# Patient Record
Sex: MALE | Race: WHITE | Employment: OTHER | URBAN - NONMETROPOLITAN AREA
[De-identification: names, ages, dates, MRNs, and addresses within clinical notes are randomized per-mention and may not be internally consistent; named-entity substitution may affect disease eponyms.]

---

## 2017-01-01 ENCOUNTER — OFFICE VISIT (OUTPATIENT)
Dept: FAMILY MEDICINE CLINIC | Age: 82
End: 2017-01-01
Payer: MEDICARE

## 2017-01-01 ENCOUNTER — CARE COORDINATION (OUTPATIENT)
Dept: CARE COORDINATION | Age: 82
End: 2017-01-01

## 2017-01-01 ENCOUNTER — HOSPITAL ENCOUNTER (OUTPATIENT)
Age: 82
Discharge: HOME | End: 2017-10-30
Payer: MEDICARE

## 2017-01-01 ENCOUNTER — OFFICE VISIT (OUTPATIENT)
Dept: ENDOCRINOLOGY | Age: 82
End: 2017-01-01
Payer: MEDICARE

## 2017-01-01 ENCOUNTER — TELEPHONE (OUTPATIENT)
Dept: FAMILY MEDICINE CLINIC | Age: 82
End: 2017-01-01

## 2017-01-01 ENCOUNTER — HOSPITAL ENCOUNTER (OUTPATIENT)
Dept: RADIATION ONCOLOGY | Age: 82
Discharge: HOME | End: 2017-11-20
Payer: MEDICARE

## 2017-01-01 ENCOUNTER — CLINICAL DOCUMENTATION (OUTPATIENT)
Dept: NUTRITION | Age: 82
End: 2017-01-01

## 2017-01-01 ENCOUNTER — TELEPHONE (OUTPATIENT)
Dept: ENDOCRINOLOGY | Age: 82
End: 2017-01-01

## 2017-01-01 ENCOUNTER — HOSPITAL ENCOUNTER (OUTPATIENT)
Age: 82
Discharge: HOME | End: 2017-09-20
Payer: MEDICARE

## 2017-01-01 ENCOUNTER — HOSPITAL ENCOUNTER (OUTPATIENT)
Age: 82
Discharge: HOME | End: 2017-09-14
Payer: MEDICARE

## 2017-01-01 ENCOUNTER — HOSPITAL ENCOUNTER (OUTPATIENT)
Dept: RADIATION ONCOLOGY | Age: 82
Discharge: HOME | End: 2017-12-26
Payer: MEDICARE

## 2017-01-01 ENCOUNTER — HOSPITAL ENCOUNTER (OUTPATIENT)
Dept: RADIATION ONCOLOGY | Age: 82
Discharge: HOME | End: 2017-12-18
Payer: MEDICARE

## 2017-01-01 ENCOUNTER — HOSPITAL ENCOUNTER (OUTPATIENT)
Age: 82
Discharge: HOME | End: 2017-12-21
Payer: MEDICARE

## 2017-01-01 ENCOUNTER — HOSPITAL ENCOUNTER (OUTPATIENT)
Dept: RADIATION ONCOLOGY | Age: 82
Discharge: HOME | End: 2017-11-15
Payer: MEDICARE

## 2017-01-01 ENCOUNTER — HOSPITAL ENCOUNTER (OUTPATIENT)
Dept: RADIATION ONCOLOGY | Age: 82
Discharge: HOME | End: 2017-12-04
Payer: MEDICARE

## 2017-01-01 ENCOUNTER — HOSPITAL ENCOUNTER (OUTPATIENT)
Age: 82
Discharge: HOME | End: 2017-10-23
Payer: MEDICARE

## 2017-01-01 ENCOUNTER — HOSPITAL ENCOUNTER (OUTPATIENT)
Age: 82
Discharge: HOME | End: 2017-11-20
Payer: MEDICARE

## 2017-01-01 ENCOUNTER — HOSPITAL ENCOUNTER (OUTPATIENT)
Dept: CT IMAGING | Age: 82
Discharge: HOME | End: 2017-11-27
Payer: MEDICARE

## 2017-01-01 ENCOUNTER — HOSPITAL ENCOUNTER (OUTPATIENT)
Dept: GENERAL RADIOLOGY | Age: 82
Discharge: HOME | End: 2017-09-14
Payer: MEDICARE

## 2017-01-01 ENCOUNTER — HOSPITAL ENCOUNTER (OUTPATIENT)
Dept: RADIATION ONCOLOGY | Age: 82
Discharge: HOME | End: 2017-11-27
Payer: MEDICARE

## 2017-01-01 ENCOUNTER — HOSPITAL ENCOUNTER (OUTPATIENT)
Age: 82
Discharge: HOME | End: 2017-08-02
Payer: MEDICARE

## 2017-01-01 ENCOUNTER — HOSPITAL ENCOUNTER (OUTPATIENT)
Dept: RADIATION ONCOLOGY | Age: 82
Discharge: HOME | End: 2017-12-11
Payer: MEDICARE

## 2017-01-01 ENCOUNTER — HOSPITAL ENCOUNTER (OUTPATIENT)
Dept: CT IMAGING | Age: 82
Discharge: HOME | End: 2017-10-30
Payer: MEDICARE

## 2017-01-01 ENCOUNTER — HOSPITAL ENCOUNTER (OUTPATIENT)
Dept: GENERAL RADIOLOGY | Age: 82
Discharge: HOME | End: 2017-10-23
Payer: MEDICARE

## 2017-01-01 ENCOUNTER — HOSPITAL ENCOUNTER (OUTPATIENT)
Age: 82
Discharge: HOME | End: 2017-11-27
Payer: MEDICARE

## 2017-01-01 ENCOUNTER — HOSPITAL ENCOUNTER (OUTPATIENT)
Dept: PET IMAGING | Age: 82
Discharge: HOME | End: 2017-11-13
Payer: MEDICARE

## 2017-01-01 ENCOUNTER — HOSPITAL ENCOUNTER (OUTPATIENT)
Dept: GENERAL RADIOLOGY | Age: 82
Discharge: HOME | End: 2017-09-20
Payer: MEDICARE

## 2017-01-01 VITALS
BODY MASS INDEX: 35.21 KG/M2 | RESPIRATION RATE: 24 BRPM | HEIGHT: 71 IN | HEART RATE: 60 BPM | SYSTOLIC BLOOD PRESSURE: 180 MMHG | WEIGHT: 251.5 LBS | DIASTOLIC BLOOD PRESSURE: 80 MMHG

## 2017-01-01 VITALS
DIASTOLIC BLOOD PRESSURE: 60 MMHG | WEIGHT: 249.2 LBS | HEART RATE: 63 BPM | SYSTOLIC BLOOD PRESSURE: 124 MMHG | TEMPERATURE: 98.7 F | OXYGEN SATURATION: 96 % | RESPIRATION RATE: 17 BRPM | BODY MASS INDEX: 34.89 KG/M2

## 2017-01-01 VITALS
OXYGEN SATURATION: 95 % | RESPIRATION RATE: 12 BRPM | DIASTOLIC BLOOD PRESSURE: 60 MMHG | BODY MASS INDEX: 35.28 KG/M2 | WEIGHT: 252 LBS | HEART RATE: 88 BPM | SYSTOLIC BLOOD PRESSURE: 136 MMHG | TEMPERATURE: 97.4 F | HEIGHT: 71 IN

## 2017-01-01 VITALS
HEART RATE: 60 BPM | RESPIRATION RATE: 20 BRPM | WEIGHT: 249 LBS | BODY MASS INDEX: 34.86 KG/M2 | BODY MASS INDEX: 34.47 KG/M2 | SYSTOLIC BLOOD PRESSURE: 136 MMHG | OXYGEN SATURATION: 95 % | DIASTOLIC BLOOD PRESSURE: 70 MMHG | WEIGHT: 246.2 LBS | HEIGHT: 71 IN | SYSTOLIC BLOOD PRESSURE: 164 MMHG | DIASTOLIC BLOOD PRESSURE: 58 MMHG | HEART RATE: 60 BPM | RESPIRATION RATE: 24 BRPM | HEIGHT: 71 IN

## 2017-01-01 VITALS
SYSTOLIC BLOOD PRESSURE: 160 MMHG | HEIGHT: 71 IN | WEIGHT: 250.8 LBS | DIASTOLIC BLOOD PRESSURE: 66 MMHG | OXYGEN SATURATION: 91 % | TEMPERATURE: 98.2 F | HEART RATE: 60 BPM | RESPIRATION RATE: 18 BRPM | BODY MASS INDEX: 35.11 KG/M2

## 2017-01-01 DIAGNOSIS — R91.1 LESION OF LEFT LUNG: ICD-10-CM

## 2017-01-01 DIAGNOSIS — R91.1 LESION OF LEFT LUNG: Primary | ICD-10-CM

## 2017-01-01 DIAGNOSIS — C34.12 MALIGNANT NEOPLASM OF UPPER LOBE, LEFT BRONCHUS OR LUNG (HCC): ICD-10-CM

## 2017-01-01 DIAGNOSIS — E11.65 UNCONTROLLED TYPE 2 DIABETES MELLITUS WITH HYPERGLYCEMIA, WITH LONG-TERM CURRENT USE OF INSULIN (HCC): Primary | ICD-10-CM

## 2017-01-01 DIAGNOSIS — C91.10 CLL (CHRONIC LYMPHOCYTIC LEUKEMIA) (HCC): ICD-10-CM

## 2017-01-01 DIAGNOSIS — Z23 NEED FOR PNEUMOCOCCAL VACCINATION: ICD-10-CM

## 2017-01-01 DIAGNOSIS — E78.01 FAMILIAL HYPERCHOLESTEROLEMIA: ICD-10-CM

## 2017-01-01 DIAGNOSIS — Z79.4 UNCONTROLLED TYPE 2 DIABETES MELLITUS WITH HYPERGLYCEMIA, WITH LONG-TERM CURRENT USE OF INSULIN (HCC): Primary | ICD-10-CM

## 2017-01-01 DIAGNOSIS — I50.42 CHRONIC COMBINED SYSTOLIC AND DIASTOLIC CONGESTIVE HEART FAILURE (HCC): ICD-10-CM

## 2017-01-01 DIAGNOSIS — J40 BRONCHITIS: ICD-10-CM

## 2017-01-01 DIAGNOSIS — J18.9 PNEUMONIA OF BOTH LUNGS DUE TO INFECTIOUS ORGANISM, UNSPECIFIED PART OF LUNG: Primary | ICD-10-CM

## 2017-01-01 DIAGNOSIS — C34.12 SQUAMOUS CELL CARCINOMA OF BRONCHUS IN LEFT UPPER LOBE (HCC): Primary | ICD-10-CM

## 2017-01-01 DIAGNOSIS — I10 ESSENTIAL HYPERTENSION: ICD-10-CM

## 2017-01-01 DIAGNOSIS — N40.0 BENIGN PROSTATIC HYPERPLASIA WITHOUT LOWER URINARY TRACT SYMPTOMS: ICD-10-CM

## 2017-01-01 DIAGNOSIS — J18.9 PNEUMONIA OF LEFT UPPER LOBE DUE TO INFECTIOUS ORGANISM: ICD-10-CM

## 2017-01-01 DIAGNOSIS — E78.5 HYPERLIPIDEMIA, UNSPECIFIED HYPERLIPIDEMIA TYPE: ICD-10-CM

## 2017-01-01 DIAGNOSIS — E11.65 UNCONTROLLED TYPE 2 DIABETES MELLITUS WITH HYPERGLYCEMIA, WITHOUT LONG-TERM CURRENT USE OF INSULIN (HCC): Primary | ICD-10-CM

## 2017-01-01 DIAGNOSIS — E78.00 HYPERCHOLESTEROLEMIA: ICD-10-CM

## 2017-01-01 DIAGNOSIS — C34.92 SQUAMOUS CELL CARCINOMA LUNG, LEFT (HCC): ICD-10-CM

## 2017-01-01 DIAGNOSIS — J18.9 PNEUMONIA OF LEFT UPPER LOBE DUE TO INFECTIOUS ORGANISM: Primary | ICD-10-CM

## 2017-01-01 DIAGNOSIS — J40 BRONCHITIS: Primary | ICD-10-CM

## 2017-01-01 DIAGNOSIS — Z23 NEEDS FLU SHOT: ICD-10-CM

## 2017-01-01 DIAGNOSIS — C34.12 SQUAMOUS CELL CARCINOMA OF BRONCHUS IN LEFT UPPER LOBE (HCC): ICD-10-CM

## 2017-01-01 DIAGNOSIS — D49.7 ADRENAL TUMOR: ICD-10-CM

## 2017-01-01 DIAGNOSIS — J18.9 PNEUMONIA OF BOTH LUNGS DUE TO INFECTIOUS ORGANISM, UNSPECIFIED PART OF LUNG: ICD-10-CM

## 2017-01-01 LAB
ALBUMIN SERPL-MCNC: 3.2 GM/DL (ref 3.5–5)
ALP BLD-CCNC: 89 U/L (ref 46–116)
ALT SERPL-CCNC: 25 U/L (ref 12–78)
ANION GAP SERPL CALCULATED.3IONS-SCNC: 15 MEQ/L (ref 8–16)
ANION GAP: 6 MEQ/L (ref 8–16)
ANISOCYTOSIS: ABNORMAL
AST SERPL-CCNC: 23 U/L (ref 15–37)
ATYPICAL LYMPHOCYTES: ABNORMAL %
BASOPHILIA: SLIGHT
BASOPHILS # BLD: 0.4 %
BASOPHILS ABSOLUTE: 0 THOU/MM3 (ref 0–0.1)
BILIRUB SERPL-MCNC: 0.7 MG/DL (ref 0.2–1)
BUN BLDV-MCNC: 22 MG/DL (ref 7–22)
BUN BLDV-MCNC: 24 MG/DL (ref 7–18)
CALCIUM SERPL-MCNC: 9.7 MG/DL (ref 8.5–10.5)
CHLORIDE BLD-SCNC: 103 MEQ/L (ref 98–111)
CHLORIDE BLD-SCNC: 106 MEQ/L (ref 98–107)
CHOLESTEROL, FASTING: 106 MG/DL (ref 100–199)
CO2: 27 MEQ/L (ref 23–33)
CO2: 31 MEQ/L (ref 21–32)
CREAT SERPL-MCNC: 0.9 MG/DL (ref 0.4–1.2)
CREAT SERPL-MCNC: 1.1 MG/DL (ref 0.4–1.2)
CREAT SERPL-MCNC: 1.1 MG/DL (ref 0.8–1.3)
CREATININE, WHOLE BLOOD: 1 MG/DL (ref 0.5–1.2)
CREATININE, WHOLE BLOOD: 1.1 MG/DL (ref 0.5–1.2)
EOSINOPHIL # BLD: 1 %
EOSINOPHILS ABSOLUTE: 0.1 THOU/MM3 (ref 0–0.4)
ESTIMATED GFR, PCACC: 67 ML/MIN/1.73M2
GFR SERPL CREATININE-BSD FRML MDRD: 63 ML/MIN/1.73M2
GFR SERPL CREATININE-BSD FRML MDRD: 79 ML/MIN/1.73M2
GFR, ESTIMATED: 67 ML/MIN/1.73M2
GFR, ESTIMATED: 75 ML/MIN/1.73M2
GLUCOSE BLD-MCNC: 182 MG/DL (ref 70–108)
GLUCOSE BLD-MCNC: 185 MG/DL (ref 74–106)
HCT VFR BLD CALC: 38.6 % (ref 42–52)
HDLC SERPL-MCNC: 44 MG/DL
HEMOGLOBIN: 13.2 GM/DL (ref 14–18)
LDL CHOLESTEROL CALCULATED: 36 MG/DL
LYMPHOCYTES # BLD: 55.4 %
LYMPHOCYTES ABSOLUTE: 5.6 THOU/MM3 (ref 1–4.8)
MAGNESIUM: 2.3 MG/DL (ref 1.6–2.4)
MCH RBC QN AUTO: 33.1 PG (ref 27–31)
MCHC RBC AUTO-ENTMCNC: 34.3 GM/DL (ref 33–37)
MCV RBC AUTO: 96.4 FL (ref 80–94)
MONOCYTES # BLD: 4.4 %
MONOCYTES ABSOLUTE: 0.4 THOU/MM3 (ref 0.4–1.3)
NUCLEATED RED BLOOD CELLS: 0 /100 WBC
PATHOLOGIST REVIEW: ABNORMAL
PDW BLD-RTO: 14.8 % (ref 11.5–14.5)
PLATELET # BLD: 111 THOU/MM3 (ref 130–400)
PLATELET ESTIMATE: ADEQUATE
PMV BLD AUTO: 8.7 MCM (ref 7.4–10.4)
POC CALCIUM: 9.3 MG/DL (ref 8.5–10.1)
POIKILOCYTES: ABNORMAL
POTASSIUM SERPL-SCNC: 4.6 MEQ/L (ref 3.5–5.2)
POTASSIUM SERPL-SCNC: 4.8 MEQ/L (ref 3.5–5.1)
RBC # BLD: 4 MILL/MM3 (ref 4.7–6.1)
SCAN OF BLOOD SMEAR: NORMAL
SEG NEUTROPHILS: 38.8 %
SEGMENTED NEUTROPHILS ABSOLUTE COUNT: 3.9 THOU/MM3 (ref 1.8–7.7)
SODIUM BLD-SCNC: 143 MEQ/L (ref 136–145)
SODIUM BLD-SCNC: 145 MEQ/L (ref 135–145)
TOTAL PROTEIN: 6.3 GM/DL (ref 6.4–8.2)
TRIGLYCERIDE, FASTING: 131 MG/DL (ref 0–199)
WBC # BLD: 10.1 THOU/MM3 (ref 4.8–10.8)

## 2017-01-01 PROCEDURE — 82310 ASSAY OF CALCIUM: CPT

## 2017-01-01 PROCEDURE — 90732 PPSV23 VACC 2 YRS+ SUBQ/IM: CPT | Performed by: FAMILY MEDICINE

## 2017-01-01 PROCEDURE — G8417 CALC BMI ABV UP PARAM F/U: HCPCS | Performed by: FAMILY MEDICINE

## 2017-01-01 PROCEDURE — 82565 ASSAY OF CREATININE: CPT

## 2017-01-01 PROCEDURE — 77386 HC NTSTY MODUL RAD TX DLVR CPLX: CPT | Performed by: RADIOLOGY

## 2017-01-01 PROCEDURE — A9552 F18 FDG: HCPCS | Performed by: RADIOLOGY

## 2017-01-01 PROCEDURE — 71020 XR CHEST STANDARD TWO VW: CPT

## 2017-01-01 PROCEDURE — G8427 DOCREV CUR MEDS BY ELIG CLIN: HCPCS | Performed by: FAMILY MEDICINE

## 2017-01-01 PROCEDURE — 71260 CT THORAX DX C+: CPT

## 2017-01-01 PROCEDURE — 80061 LIPID PANEL: CPT

## 2017-01-01 PROCEDURE — 99211 OFF/OP EST MAY X REQ PHY/QHP: CPT | Performed by: RADIOLOGY

## 2017-01-01 PROCEDURE — G8484 FLU IMMUNIZE NO ADMIN: HCPCS | Performed by: INTERNAL MEDICINE

## 2017-01-01 PROCEDURE — G8417 CALC BMI ABV UP PARAM F/U: HCPCS | Performed by: INTERNAL MEDICINE

## 2017-01-01 PROCEDURE — 4040F PNEUMOC VAC/ADMIN/RCVD: CPT | Performed by: FAMILY MEDICINE

## 2017-01-01 PROCEDURE — 82947 ASSAY GLUCOSE BLOOD QUANT: CPT

## 2017-01-01 PROCEDURE — 1036F TOBACCO NON-USER: CPT | Performed by: FAMILY MEDICINE

## 2017-01-01 PROCEDURE — 80053 COMPREHEN METABOLIC PANEL: CPT

## 2017-01-01 PROCEDURE — 77336 RADIATION PHYSICS CONSULT: CPT | Performed by: RADIOLOGY

## 2017-01-01 PROCEDURE — 77386 HC NTSTY MODUL RAD TX DLVR CPLX: CPT

## 2017-01-01 PROCEDURE — 1123F ACP DISCUSS/DSCN MKR DOCD: CPT | Performed by: INTERNAL MEDICINE

## 2017-01-01 PROCEDURE — 1036F TOBACCO NON-USER: CPT | Performed by: INTERNAL MEDICINE

## 2017-01-01 PROCEDURE — 3430000000 HC RX DIAGNOSTIC RADIOPHARMACEUTICAL: Performed by: RADIOLOGY

## 2017-01-01 PROCEDURE — 77331 SPECIAL RADIATION DOSIMETRY: CPT | Performed by: RADIOLOGY

## 2017-01-01 PROCEDURE — G8484 FLU IMMUNIZE NO ADMIN: HCPCS | Performed by: FAMILY MEDICINE

## 2017-01-01 PROCEDURE — 99214 OFFICE O/P EST MOD 30 MIN: CPT | Performed by: INTERNAL MEDICINE

## 2017-01-01 PROCEDURE — 77301 RADIOTHERAPY DOSE PLAN IMRT: CPT | Performed by: RADIOLOGY

## 2017-01-01 PROCEDURE — 4040F PNEUMOC VAC/ADMIN/RCVD: CPT | Performed by: INTERNAL MEDICINE

## 2017-01-01 PROCEDURE — 84132 ASSAY OF SERUM POTASSIUM: CPT

## 2017-01-01 PROCEDURE — 77300 RADIATION THERAPY DOSE PLAN: CPT | Performed by: RADIOLOGY

## 2017-01-01 PROCEDURE — 80048 BASIC METABOLIC PNL TOTAL CA: CPT

## 2017-01-01 PROCEDURE — 84155 ASSAY OF PROTEIN SERUM: CPT

## 2017-01-01 PROCEDURE — 77370 RADIATION PHYSICS CONSULT: CPT | Performed by: RADIOLOGY

## 2017-01-01 PROCEDURE — 99213 OFFICE O/P EST LOW 20 MIN: CPT | Performed by: FAMILY MEDICINE

## 2017-01-01 PROCEDURE — 6360000004 HC RX CONTRAST MEDICATION: Performed by: FAMILY MEDICINE

## 2017-01-01 PROCEDURE — 90686 IIV4 VACC NO PRSV 0.5 ML IM: CPT | Performed by: FAMILY MEDICINE

## 2017-01-01 PROCEDURE — 36415 COLL VENOUS BLD VENIPUNCTURE: CPT

## 2017-01-01 PROCEDURE — 77338 DESIGN MLC DEVICE FOR IMRT: CPT | Performed by: RADIOLOGY

## 2017-01-01 PROCEDURE — 82247 BILIRUBIN TOTAL: CPT

## 2017-01-01 PROCEDURE — 84450 TRANSFERASE (AST) (SGOT): CPT

## 2017-01-01 PROCEDURE — 99214 OFFICE O/P EST MOD 30 MIN: CPT | Performed by: FAMILY MEDICINE

## 2017-01-01 PROCEDURE — 84520 ASSAY OF UREA NITROGEN: CPT

## 2017-01-01 PROCEDURE — 82435 ASSAY OF BLOOD CHLORIDE: CPT

## 2017-01-01 PROCEDURE — 84075 ASSAY ALKALINE PHOSPHATASE: CPT

## 2017-01-01 PROCEDURE — 84460 ALANINE AMINO (ALT) (SGPT): CPT

## 2017-01-01 PROCEDURE — 1123F ACP DISCUSS/DSCN MKR DOCD: CPT | Performed by: FAMILY MEDICINE

## 2017-01-01 PROCEDURE — 77334 RADIATION TREATMENT AID(S): CPT | Performed by: RADIOLOGY

## 2017-01-01 PROCEDURE — 96372 THER/PROPH/DIAG INJ SC/IM: CPT | Performed by: FAMILY MEDICINE

## 2017-01-01 PROCEDURE — 82040 ASSAY OF SERUM ALBUMIN: CPT

## 2017-01-01 PROCEDURE — 77336 RADIATION PHYSICS CONSULT: CPT

## 2017-01-01 PROCEDURE — 84295 ASSAY OF SERUM SODIUM: CPT

## 2017-01-01 PROCEDURE — 83735 ASSAY OF MAGNESIUM: CPT

## 2017-01-01 PROCEDURE — 85025 COMPLETE CBC W/AUTO DIFF WBC: CPT

## 2017-01-01 PROCEDURE — 3209999900 CT GUIDE RADIATION THERAPY NO CHARGE

## 2017-01-01 PROCEDURE — 77332 RADIATION TREATMENT AID(S): CPT | Performed by: RADIOLOGY

## 2017-01-01 PROCEDURE — 82374 ASSAY BLOOD CARBON DIOXIDE: CPT

## 2017-01-01 PROCEDURE — G8427 DOCREV CUR MEDS BY ELIG CLIN: HCPCS | Performed by: INTERNAL MEDICINE

## 2017-01-01 PROCEDURE — G0008 ADMIN INFLUENZA VIRUS VAC: HCPCS | Performed by: FAMILY MEDICINE

## 2017-01-01 PROCEDURE — 78815 PET IMAGE W/CT SKULL-THIGH: CPT

## 2017-01-01 PROCEDURE — G0009 ADMIN PNEUMOCOCCAL VACCINE: HCPCS | Performed by: FAMILY MEDICINE

## 2017-01-01 RX ORDER — CLINDAMYCIN HYDROCHLORIDE 300 MG/1
300 CAPSULE ORAL 3 TIMES DAILY
Qty: 30 CAPSULE | Refills: 0 | Status: SHIPPED | OUTPATIENT
Start: 2017-01-01 | End: 2017-01-01

## 2017-01-01 RX ORDER — FLUDEOXYGLUCOSE F 18 200 MCI/ML
12.1 INJECTION, SOLUTION INTRAVENOUS
Status: COMPLETED | OUTPATIENT
Start: 2017-01-01 | End: 2017-01-01

## 2017-01-01 RX ORDER — TRIAMCINOLONE ACETONIDE 40 MG/ML
40 INJECTION, SUSPENSION INTRA-ARTICULAR; INTRAMUSCULAR ONCE
Status: COMPLETED | OUTPATIENT
Start: 2017-01-01 | End: 2017-01-01

## 2017-01-01 RX ORDER — HYDRALAZINE HYDROCHLORIDE 10 MG/1
TABLET, FILM COATED ORAL
Qty: 360 TABLET | Refills: 3 | Status: ON HOLD | OUTPATIENT
Start: 2017-01-01 | End: 2018-01-01 | Stop reason: HOSPADM

## 2017-01-01 RX ORDER — ATORVASTATIN CALCIUM 80 MG/1
TABLET, FILM COATED ORAL
Qty: 90 TABLET | Refills: 11 | Status: ON HOLD | OUTPATIENT
Start: 2017-01-01 | End: 2018-01-01

## 2017-01-01 RX ORDER — LISINOPRIL 30 MG/1
TABLET ORAL
Qty: 90 TABLET | Refills: 3 | Status: SHIPPED | OUTPATIENT
Start: 2017-01-01 | End: 2018-01-01 | Stop reason: DRUGHIGH

## 2017-01-01 RX ORDER — CEFTRIAXONE 1 G/1
1 INJECTION, POWDER, FOR SOLUTION INTRAMUSCULAR; INTRAVENOUS ONCE
Status: COMPLETED | OUTPATIENT
Start: 2017-01-01 | End: 2017-01-01

## 2017-01-01 RX ORDER — LEVOFLOXACIN 500 MG/1
500 TABLET, FILM COATED ORAL DAILY
Qty: 10 TABLET | Refills: 0 | Status: SHIPPED | OUTPATIENT
Start: 2017-01-01 | End: 2017-01-01 | Stop reason: ALTCHOICE

## 2017-01-01 RX ORDER — BLOOD-GLUCOSE METER
1 KIT MISCELLANEOUS DAILY
Qty: 1 KIT | Refills: 0 | Status: ON HOLD | OUTPATIENT
Start: 2017-01-01 | End: 2018-01-01

## 2017-01-01 RX ADMIN — IOPAMIDOL 100 ML: 755 INJECTION, SOLUTION INTRAVENOUS at 10:13

## 2017-01-01 RX ADMIN — CEFTRIAXONE 1 G: 1 INJECTION, POWDER, FOR SOLUTION INTRAMUSCULAR; INTRAVENOUS at 15:37

## 2017-01-01 RX ADMIN — TRIAMCINOLONE ACETONIDE 40 MG: 40 INJECTION, SUSPENSION INTRA-ARTICULAR; INTRAMUSCULAR at 15:39

## 2017-01-01 RX ADMIN — FLUDEOXYGLUCOSE F 18 12.1 MILLICURIE: 200 INJECTION, SOLUTION INTRAVENOUS at 12:23

## 2017-01-01 ASSESSMENT — PATIENT HEALTH QUESTIONNAIRE - PHQ9
2. FEELING DOWN, DEPRESSED OR HOPELESS: 0
1. LITTLE INTEREST OR PLEASURE IN DOING THINGS: 1
SUM OF ALL RESPONSES TO PHQ9 QUESTIONS 1 & 2: 1
SUM OF ALL RESPONSES TO PHQ QUESTIONS 1-9: 1

## 2017-01-01 ASSESSMENT — ENCOUNTER SYMPTOMS: BLURRED VISION: 0

## 2017-01-26 ENCOUNTER — CARE COORDINATION (OUTPATIENT)
Dept: CARE COORDINATION | Age: 82
End: 2017-01-26

## 2017-02-23 ENCOUNTER — CARE COORDINATION (OUTPATIENT)
Dept: CARE COORDINATION | Age: 82
End: 2017-02-23

## 2017-03-30 ENCOUNTER — CARE COORDINATION (OUTPATIENT)
Dept: CARE COORDINATION | Age: 82
End: 2017-03-30

## 2017-04-19 RX ORDER — INSULIN ASPART 100 [IU]/ML
INJECTION, SUSPENSION SUBCUTANEOUS
Qty: 8 PEN | Refills: 1 | Status: SHIPPED | OUTPATIENT
Start: 2017-04-19 | End: 2017-01-01 | Stop reason: SDUPTHER

## 2017-05-04 ENCOUNTER — CARE COORDINATION (OUTPATIENT)
Dept: CARE COORDINATION | Age: 82
End: 2017-05-04

## 2017-05-25 ENCOUNTER — TELEPHONE (OUTPATIENT)
Dept: FAMILY MEDICINE CLINIC | Age: 82
End: 2017-05-25

## 2017-06-06 ENCOUNTER — CARE COORDINATION (OUTPATIENT)
Dept: CARE COORDINATION | Age: 82
End: 2017-06-06

## 2017-06-29 ENCOUNTER — TELEPHONE (OUTPATIENT)
Dept: ENDOCRINOLOGY | Age: 82
End: 2017-06-29

## 2017-06-29 DIAGNOSIS — E78.01 FAMILIAL HYPERCHOLESTEROLEMIA: ICD-10-CM

## 2017-06-29 RX ORDER — LISINOPRIL 20 MG/1
TABLET ORAL
Qty: 90 TABLET | Refills: 0 | Status: SHIPPED | OUTPATIENT
Start: 2017-06-29 | End: 2017-01-01 | Stop reason: SDUPTHER

## 2017-07-05 RX ORDER — METFORMIN HYDROCHLORIDE 500 MG/1
TABLET, EXTENDED RELEASE ORAL
Qty: 180 TABLET | Refills: 1 | Status: SHIPPED | OUTPATIENT
Start: 2017-07-05 | End: 2017-01-01

## 2017-07-17 ENCOUNTER — TELEPHONE (OUTPATIENT)
Dept: FAMILY MEDICINE CLINIC | Age: 82
End: 2017-07-17

## 2017-07-17 RX ORDER — TAMSULOSIN HYDROCHLORIDE 0.4 MG/1
CAPSULE ORAL
Qty: 180 CAPSULE | Refills: 3 | Status: SHIPPED | OUTPATIENT
Start: 2017-07-17 | End: 2018-01-01 | Stop reason: ALTCHOICE

## 2017-07-21 ENCOUNTER — TELEPHONE (OUTPATIENT)
Dept: ENDOCRINOLOGY | Age: 82
End: 2017-07-21

## 2017-10-17 NOTE — CARE COORDINATION
Attempted to reach patient from our conversation earlier today. Patient was not available and generic voicemail message was left asking patient to please return call to my direct number.
Dr. Juan Jose Carrera:  Please see below note. Patient reported he continues to have c/o cough with hemoptysis, especially in the AM.  Patient denies any c/o fever/night sweats or increased SOB. Patient continues to take breathing treatments at least BID and is wearing O2 cont. at 2 1/2 L. Patient continues to take baby ASA and Plavix as prescribed. Patient stated he has order for x-ray to be completed prior to his appointment. Patient is scheduled to f/u with you 10/25.
albuterol (PROVENTIL) (2.5 MG/3ML) 0.083% nebulizer solution Take 3 mLs by nebulization every 4 hours as needed for Wheezing 4/18/16   Nora Momin MD   Scooter MISC by Does not apply route. 8/26/13   Nora Momin MD   CHERRY PO Take 1 capsule by mouth daily. Historical Provider, MD Severino MISC Check 2x/day 7/10/13   Sandy Alegria MD   metoprolol (LOPRESSOR) 100 MG tablet Take 100 mg by mouth 2 times daily. Historical Provider, MD   primidone (MYSOLINE) 250 MG tablet Take 250 mg by mouth 3 times daily. Historical Provider, MD   Coenzyme Q10 (COQ10 PO) Take  by mouth. Historical Provider, MD   pantoprazole (PROTONIX) 40 MG tablet Take 40 mg by mouth daily. Historical Provider, MD   aspirin 81 MG tablet Take 81 mg by mouth daily. Historical Provider, MD   Cranberry 1000 MG CAPS Take  by mouth. Historical Provider, MD   fish oil-omega-3 fatty acids 1000 MG capsule Take 2 g by mouth daily. Historical Provider, MD   therapeutic multivitamin-minerals (THERAGRAN-M) tablet Take 1 tablet by mouth daily.       Historical Provider, MD       Future Appointments  Date Time Provider Kinjal Pate   10/25/2017 10:10 AM [facility] [place] [facility]   11/30/2017 2:00 PM Sandy Alegria MD [facility] [place] [facility]     General Assessment    Do you have any symptoms that are causing concern?:  Yes  Progression since Onset:  Intermittent - Waxing/Waning  Reported Symptoms:  Other (Comment: hemoptysis )      and   Congestive Heart Failure Assessment    Are you currently restricting fluids?:  No Restriction  Do you understand a low sodium diet?:  Yes     No patient-reported symptoms      Symptoms:   None:  Yes      Symptom course:  stable  Patient-reported weight (lb):  246  Weight trend:  stable     ,   Diabetes Assessment    Meal Planning:  Avoidance of concentrated sweets   How often do you test your blood sugar?:  Daily   Do you have barriers with adherence to non-pharmacologic

## 2017-10-31 NOTE — CARE COORDINATION
Ambulatory Care Coordination Note  10/31/2017  CM Risk Score: 3  Dione Mortality Risk Score: 49.72    ACC: Lauren Amin RN    Summary Note: Patient was called for continued Care Coordination follow up and education. Patient shared he is starting to feel better and his cough has diminished. Patient denied any further c/o hemoptysis. CT of the chest was completed yesterday and patient awaits results. Informed patient PCP office should be calling in the next 1-2 days with results and he acknowledged understanding. Patient stated his swelling remains at his baseline and improves with elevation. Patient continues to wear his O2 as instructed. Daily weight monitoring continues and patient stated his weight remains stable in the 248-252 range. Symptoms to call and report and importance of early symptom recognition reviewed with patient and he acknowledged understanding. Patient stated he continues to monitor BS daily and BS was 172 this AM.  Diabetic diet information was briefly reviewed. Patient continues to take insulin as instructed and insulin administration reviewed with patient. CHF and diabetes zone education sheets also reviewed with patient and copy placed in the mail. Patient denied any other questions, concerns, or needs and he was encouraged to call with any that may develop. Care Coordination Interventions    Program Enrollment:  Rising Risk  Referral from Primary Care Provider:  No  Suggested Interventions and Community Resources  Diabetes Education:  Completed  Fall Risk Prevention:  Completed  Medication Assistance Program:  Declined (Comment: denied need at this time )  Medi Set or Pill Pack:  Completed  Transportation Support:  Declined  Zone Management Tools:  Completed  Other Services or Interventions:   Follows with endo         Goals Addressed             Most Recent     Care Coordination Goal: Live Independent   On track (10/31/2017)             Care Coordination Goal: Independent

## 2017-11-01 NOTE — TELEPHONE ENCOUNTER
Patient notified of the cT results with a tumor in the DIANA  He wants to think about it a little bit but is not interested in chemo or radiation  He does not want me to call the kids yet  Call later in the week and have him schedule a followup in 1 month

## 2017-11-03 NOTE — TELEPHONE ENCOUNTER
Patient voiced he is doing OK, appointment scheduled and reminder letter sent to patient. No concerns voiced.

## 2017-11-19 NOTE — PROGRESS NOTES
[address]  Encounter Date: 11/15/2017     Mr. Johny Jack is a 80 y.o. male  : 1928  MRN: [id number]  Acct Number: [de-identified]  Requesting Provider: Dr. Ayaan Lynch    Reason for request: Recurrent Lung Cancer      CONSULTANT: Cory Luong      CHIEF COMPLAINT: C34.12 -- Malignant Neoplasm of the Left Upper Lobe Bronchus or Lung, Poorly Differentiated Squamous Cell Carcinoma, T1a N0 M0, Stage IA at initial diagnosis, s/p Stereotactic Ablative Radiation Therapy in , now with clinical diagnosis of recurrence adjacent to prior treatment area (marginal recurrence), recurrent stage r T2a N2 M0, Stage IIIA. [NOTE: PET/CT shows sizable area of dense fibrotic/inflammatory change related to prior SBRT/SABR, with an adjacent/contiguous mass with SUV up to 24.4, with two hypermetabolic lymph nodes in the ipsilateral mediastinum]  C91.11 -- Chronic Lymphocytic Leukemia      HISTORY OF PRESENT ILLNESS:   Memo Candelaria was previously known to radiation oncology. He has a history of CLL, and was seen for a radiation oncology consultation in 2015 after being diagnosed with squamous cell carcinoma of the left upper lobe lung. He was not considered medically operable, and in accordance with clinical practice guidelines he received a recommendation for, and was agreeable to stereotactic ablative radiation therapy. He underwent the treatment in early 2015. At the time of his initial post-treatment check he was doing well. He was subsequently seen by Dr. Bishnu Montiel for follow-up in 2015. At that time, follow-up CT scanning showed what appeared to be a fibrotic/inflammatory response to the treatment. Repeat PET scanning showed that the lesion had significant decrease in hypermetabolic uptake consistent with favorable response to therapy. He was seen for a checkup in 2016 and was to have returned in 3-4 months.   However, he canceled the disease     Shingles     history of    Skin cancer     Dr Nohemy Estrada Tobacco abuse     history of        Past Surgical History:   Procedure Laterality Date    APPENDECTOMY      BACK SURGERY  1995    discectomy    CARDIAC SURGERY  1992    CABG    CATARACT REMOVAL Bilateral     COLONOSCOPY  11/2005    HERNIA REPAIR      bilateral inguinal    INNER EAR SURGERY      x2 , Dr Luc Chacon LIVER BIOPSY      OTHER SURGICAL HISTORY      excision of basal cell carcinoma    OTHER SURGICAL HISTORY  2000    right lower eyelid    PACEMAKER PLACEMENT         Family History   Problem Relation Age of Onset    Heart Disease Mother     Diabetes Mother     Heart Disease Father        Social History     Social History    Marital status:      Spouse name: N/A    Number of children: N/A    Years of education: N/A     Occupational History    Not on file.      Social History Main Topics    Smoking status: Former Smoker     Years: 45.00     Quit date: 7/29/1992    Smokeless tobacco: Never Used    Alcohol use No    Drug use: No    Sexual activity: Not on file     Other Topics Concern    Not on file     Social History Narrative    No narrative on file       Exposure to Industrial/ environmental Carcinogens: no    ALLERGIES:   Allergies   Allergen Reactions    Quinidine Hives        Current Outpatient Prescriptions   Medication Sig Dispense Refill    hydrALAZINE (APRESOLINE) 10 MG tablet TAKE ONE TABLET BY MOUTH 4 TIMES DAILY 360 tablet 3    ipratropium (ATROVENT) 0.02 % nebulizer solution Take 2.5 mLs by nebulization every 4 hours as needed for Wheezing 300 mL 3    Blood Glucose Monitoring Suppl (ONE TOUCH ULTRA MINI) w/Device KIT 1 kit by Does not apply route daily Dx E11.8 1 kit 0    tamsulosin (FLOMAX) 0.4 MG capsule TAKE ONE CAPSULE BY MOUTH TWICE DAILY 180 capsule 3    metFORMIN (GLUCOPHAGE-XR) 500 MG extended release tablet TAKE ONE TABLET BY MOUTH TWICE DAILY 180 tablet 1    lisinopril

## 2017-11-30 NOTE — PROGRESS NOTES
[facility] PROFESSIONAL SERVS  ENDOCRINE DIABETES METABOLISM [facility]  [address] [address] [facility]  [address] [place] [address]  Dept: [phone]  Dept Fax: [phone]  Loc: [phone]    Visit Date: 11/30/2017    Halle Tavera is a 80 y.o. male who presents today for:  Chief Complaint   Patient presents with    Diabetes     Type 2    Foot Problem     no problems at this time    Eye Problem     been about 3 years ago    Hypertension    Hyperlipidemia    Other     Adrenal tumor            Subjective:     Diabetes   He presents for his follow-up diabetic visit. He has type 2 diabetes mellitus. Onset time: >10 years. His disease course has been fluctuating. Pertinent negatives for hypoglycemia include no sweats or tremors. Associated symptoms include polydipsia and polyuria. Pertinent negatives for diabetes include no blurred vision, no chest pain, no fatigue, no foot paresthesias and no foot ulcerations. Pertinent negatives for hypoglycemia complications include no blackouts and no hospitalization. Diabetic complications include heart disease. Pertinent negatives for diabetic complications include no CVA or retinopathy. Risk factors for coronary artery disease include diabetes mellitus, dyslipidemia and hypertension. Current diabetic treatments: 70/30 novolog 13/13. His weight is decreasing steadily. He is following a generally healthy diet. He has not had a previous visit with a dietitian. He never participates in exercise. He monitors blood glucose at home 1-2 x per day. An ACE inhibitor/angiotensin II receptor blocker is being taken. He sees a podiatrist ([facility] care). Eye exam is not current. recurrent lung cancer, getting ready for XRT. He is feeling extremely weak from this therapy. Patient has been off of metformin for about a month due to CT with contrast for cancer treatment. Patient also has bilateral adrenal nodules. He does not want any further workup. He is also being treated for hypertension.   He takes lisinopril, hydralazine and metoprolol. He did not take his metoprolol this morning because he could not find the bottle. Blood pressure was elevated at 182/72. He denies headaches and chest pains.   Past Medical History:   Diagnosis Date    Adrenal disease ([facility]     Dr Seth Oswald Adrenal tumor 9/24/2012    Atrial fibrillation (HCC)     BPH (benign prostatic hyperplasia) 7/30/2012    CAD (coronary artery disease)     Dr Thomas Osullivan Cavernous hemangioma     of liver, history of    Cerebrovascular accident ([facility]     CHF (congestive heart failure) ([facility] 7/30/2012    Dr Charlette Ruiz    Chronic sinusitis     CLL (chronic lymphocytic leukemia) ([facility] 12/2012    Dr Marty Carlson COPD (chronic obstructive pulmonary disease) ([facility]     Diabetes mellitus type II, uncontrolled ([facility] 7/30/2012    Dyspnea on exertion     Essential tremor 7/30/2012    [facility] Kaitlin Hogan GERD (gastroesophageal reflux disease)     Hyperlipidemia     Hypertension     Incontinence     Kidney stone     Lung cancer ([facility]     Nephrolithiasis     Obesity     Prostate disease     Shingles     history of    Skin cancer     Dr Trish Caballero Tobacco abuse     history of      Past Surgical History:   Procedure Laterality Date    APPENDECTOMY      BACK SURGERY  1995    discectomy    CARDIAC SURGERY  1992    CABG    CATARACT REMOVAL Bilateral     COLONOSCOPY  11/2005    HERNIA REPAIR      bilateral inguinal    INNER EAR SURGERY      x2 , Dr Kathya Dunn OTHER SURGICAL HISTORY      excision of basal cell carcinoma    OTHER SURGICAL HISTORY  2000    right lower eyelid    PACEMAKER PLACEMENT         Family History   Problem Relation Age of Onset    Heart Disease Mother     Diabetes Mother     Heart Disease Father      Social History   Substance Use Topics    Smoking status: Former Smoker     Years: 45.00     Quit date: 7/29/1992    Smokeless tobacco: Never Used    Alcohol use No      Current Outpatient Prescriptions Medication Sig Dispense Refill    insulin aspart protamine-insulin aspart (NOVOLOG MIX 70/30 FLEXPEN) (70-30) 100 UNIT/ML injection Inject 15 Units into the skin 2 times daily (with meals) 8 Pen 1    hydrALAZINE (APRESOLINE) 10 MG tablet TAKE ONE TABLET BY MOUTH 4 TIMES DAILY 360 tablet 3    ipratropium (ATROVENT) 0.02 % nebulizer solution Take 2.5 mLs by nebulization every 4 hours as needed for Wheezing 300 mL 3    Blood Glucose Monitoring Suppl (ONE TOUCH ULTRA MINI) w/Device KIT 1 kit by Does not apply route daily Dx E11.8 1 kit 0    tamsulosin (FLOMAX) 0.4 MG capsule TAKE ONE CAPSULE BY MOUTH TWICE DAILY 180 capsule 3    metFORMIN (GLUCOPHAGE-XR) 500 MG extended release tablet TAKE ONE TABLET BY MOUTH TWICE DAILY 180 tablet 1    clopidogrel (PLAVIX) 75 MG tablet TAKE ONE TABLET BY MOUTH ONCE DAILY 90 tablet 3    Insulin Pen Needle (B-D ULTRAFINE III SHORT PEN) 31G X 8 MM MISC Inject insulin SQ twice daily (Dx: E11.8) 100 each 1    ONE TOUCH ULTRA TEST strip Check blood sugar twice daily (Dx: E11.8) 200 strip 1    terazosin (HYTRIN) 5 MG capsule TAKE ONE CAPSULE BY MOUTH ONCE EVERY NIGHT 90 capsule 3    furosemide (LASIX) 40 MG tablet TAKE ONE TABLET BY MOUTH ONCE DAILY 90 tablet 3    methenamine (HIPREX) 1 G tablet Take 1 g by mouth 2 times daily (with meals)      atorvastatin (LIPITOR) 80 MG tablet Take 1 tablet by mouth daily 90 tablet 11    nitroGLYCERIN (NITROSTAT) 0.4 MG SL tablet Place 1 tablet under the tongue every 5 minutes as needed for Chest pain 15 tablet 6    albuterol (PROVENTIL) (2.5 MG/3ML) 0.083% nebulizer solution Take 3 mLs by nebulization every 4 hours as needed for Wheezing 300 each 2    Scooter MISC by Does not apply route. 1 each 0    CHERRY PO Take 1 capsule by mouth daily.  Lancets MISC Check 2x/day 100 each 3    metoprolol (LOPRESSOR) 100 MG tablet Take 100 mg by mouth 2 times daily.  primidone (MYSOLINE) 250 MG tablet Take 250 mg by mouth 3 times daily.  Coenzyme Q10 (COQ10 PO) Take  by mouth.  pantoprazole (PROTONIX) 40 MG tablet Take 40 mg by mouth daily.  aspirin 81 MG tablet Take 81 mg by mouth daily.  Cranberry 1000 MG CAPS Take  by mouth.  fish oil-omega-3 fatty acids 1000 MG capsule Take 2 g by mouth daily.  therapeutic multivitamin-minerals (THERAGRAN-M) tablet Take 1 tablet by mouth daily.  lisinopril (PRINIVIL;ZESTRIL) 20 MG tablet TAKE ONE TABLET BY MOUTH ONCE DAILY 90 tablet 3     No current facility-administered medications for this visit. Allergies   Allergen Reactions    Quinidine Hives     Health Maintenance   Topic Date Due    DTaP/Tdap/Td vaccine (1 - Tdap) 10/07/2021 (Originally 8/12/1947)    Flu vaccine  Completed    Pneumococcal high/highest risk  Completed       Labs  Lab Results   Component Value Date    LABA1C 6.5 (H) 07/03/2017     No results found for: EAG  Lab Results   Component Value Date    TSH 1.730 06/13/2016     Lab Results   Component Value Date    CHOL 101 07/03/2017    CHOL 103 06/13/2016    CHOL 125 04/08/2015     Lab Results   Component Value Date    TRIG 128 07/03/2017    TRIG 195 06/13/2016    TRIG 169 04/08/2015     Lab Results   Component Value Date    HDL 44 10/30/2017    HDL 40 07/03/2017    HDL 38 06/13/2016     Lab Results   Component Value Date    LDLCALC 36 10/30/2017    LDLCALC 35 07/03/2017    LDLCALC 26 06/13/2016     No results found for: LABVLDL, VLDL  No results found for: CHOLHDLRATIO      Review of Systems  Constitutional: negative for chills, fatigue and fevers  Eyes: negative for irritation, redness and visual disturbance  Respiratory: negative for cough, shortness of breath and wheezing  Cardiovascular: negative for chest pain, irregular heart beat and palpitations    The remainder of systems were reviewed and negative.      Objective:   BP (!) 180/80   Pulse 60   Resp 24   Ht 5' 10.87\" (1.8 m)   Wt 251 lb 8 oz (114.1 kg)   BMI 35.21 kg/m²

## 2017-11-30 NOTE — TELEPHONE ENCOUNTER
nick from dr perry's office calling. Patient was seen today and noted to have bp of 182/77 then 180/80 about 50 min later. He cannot find his lopressor bottle. He was advised to go home and look for it and if cannot be found dr Ashely Shaw will call in rx. But they requested that patient take his bp at home tomorrow and call dr Loan Wagner with update and f/u w her as needed.

## 2017-11-30 NOTE — LETTER
Endocrine Diabetes Metabolism [facility]  [address] [facility]  [address] [place] [address]  Phone: [phone]  Fax: [phone]    Joelle Layton MD      11/30/17    Dr. Bela Mireles    Patient: Vidal Rai  MR Number: [id number]  YOB: 1928  Date of Visit: 11/30/2017      Dear Dr. Bela Mireles    Thank you for the request for consultation for Vidal Rai to me for the evaluation of   Chief Complaint   Patient presents with    Diabetes     Type 2    Foot Problem     no problems at this time    Eye Problem     been about 3 years ago    Hypertension    Hyperlipidemia    Other     Adrenal tumor   . Below are the relevant portions of my assessment and plan of care. Subjective:     Diabetes   He presents for his follow-up diabetic visit. He has type 2 diabetes mellitus. Onset time: >10 years. His disease course has been fluctuating. Pertinent negatives for hypoglycemia include no sweats or tremors. Associated symptoms include polydipsia and polyuria. Pertinent negatives for diabetes include no blurred vision, no chest pain, no fatigue, no foot paresthesias and no foot ulcerations. Pertinent negatives for hypoglycemia complications include no blackouts and no hospitalization. Diabetic complications include heart disease. Pertinent negatives for diabetic complications include no CVA or retinopathy. Risk factors for coronary artery disease include diabetes mellitus, dyslipidemia and hypertension. Current diabetic treatments: 70/30 novolog 13/13. His weight is decreasing steadily. He is following a generally healthy diet. He has not had a previous visit with a dietitian. He never participates in exercise. He monitors blood glucose at home 1-2 x per day. An ACE inhibitor/angiotensin II receptor blocker is being taken. He sees a podiatrist ([facility] care). Eye exam is not current. recurrent lung cancer, getting ready for XRT.   He is feeling extremely  Diabetes Mother     Heart Disease Father      Social History   Substance Use Topics    Smoking status: Former Smoker     Years: 45.00     Quit date: 7/29/1992    Smokeless tobacco: Never Used    Alcohol use No      Current Outpatient Prescriptions   Medication Sig Dispense Refill    insulin aspart protamine-insulin aspart (NOVOLOG MIX 70/30 FLEXPEN) (70-30) 100 UNIT/ML injection Inject 15 Units into the skin 2 times daily (with meals) 8 Pen 1    hydrALAZINE (APRESOLINE) 10 MG tablet TAKE ONE TABLET BY MOUTH 4 TIMES DAILY 360 tablet 3    ipratropium (ATROVENT) 0.02 % nebulizer solution Take 2.5 mLs by nebulization every 4 hours as needed for Wheezing 300 mL 3    Blood Glucose Monitoring Suppl (ONE TOUCH ULTRA MINI) w/Device KIT 1 kit by Does not apply route daily Dx E11.8 1 kit 0    tamsulosin (FLOMAX) 0.4 MG capsule TAKE ONE CAPSULE BY MOUTH TWICE DAILY 180 capsule 3    metFORMIN (GLUCOPHAGE-XR) 500 MG extended release tablet TAKE ONE TABLET BY MOUTH TWICE DAILY 180 tablet 1    clopidogrel (PLAVIX) 75 MG tablet TAKE ONE TABLET BY MOUTH ONCE DAILY 90 tablet 3    Insulin Pen Needle (B-D ULTRAFINE III SHORT PEN) 31G X 8 MM MISC Inject insulin SQ twice daily (Dx: E11.8) 100 each 1    ONE TOUCH ULTRA TEST strip Check blood sugar twice daily (Dx: E11.8) 200 strip 1    terazosin (HYTRIN) 5 MG capsule TAKE ONE CAPSULE BY MOUTH ONCE EVERY NIGHT 90 capsule 3    furosemide (LASIX) 40 MG tablet TAKE ONE TABLET BY MOUTH ONCE DAILY 90 tablet 3    methenamine (HIPREX) 1 G tablet Take 1 g by mouth 2 times daily (with meals)      atorvastatin (LIPITOR) 80 MG tablet Take 1 tablet by mouth daily 90 tablet 11    nitroGLYCERIN (NITROSTAT) 0.4 MG SL tablet Place 1 tablet under the tongue every 5 minutes as needed for Chest pain 15 tablet 6    albuterol (PROVENTIL) (2.5 MG/3ML) 0.083% nebulizer solution Take 3 mLs by nebulization every 4 hours as needed for Wheezing 300 each 2  Scooter MISC by Does not apply route. 1 each 0    CHERRY PO Take 1 capsule by mouth daily.  Lancets MISC Check 2x/day 100 each 3    metoprolol (LOPRESSOR) 100 MG tablet Take 100 mg by mouth 2 times daily.  primidone (MYSOLINE) 250 MG tablet Take 250 mg by mouth 3 times daily.  Coenzyme Q10 (COQ10 PO) Take  by mouth.  pantoprazole (PROTONIX) 40 MG tablet Take 40 mg by mouth daily.  aspirin 81 MG tablet Take 81 mg by mouth daily.  Cranberry 1000 MG CAPS Take  by mouth.  fish oil-omega-3 fatty acids 1000 MG capsule Take 2 g by mouth daily.  therapeutic multivitamin-minerals (THERAGRAN-M) tablet Take 1 tablet by mouth daily.  lisinopril (PRINIVIL;ZESTRIL) 20 MG tablet TAKE ONE TABLET BY MOUTH ONCE DAILY 90 tablet 3     No current facility-administered medications for this visit.       Allergies   Allergen Reactions    Quinidine Hives     Health Maintenance   Topic Date Due    DTaP/Tdap/Td vaccine (1 - Tdap) 10/07/2021 (Originally 8/12/1947)    Flu vaccine  Completed    Pneumococcal high/highest risk  Completed       Labs  Lab Results   Component Value Date    LABA1C 6.5 (H) 07/03/2017     No results found for: EAG  Lab Results   Component Value Date    TSH 1.730 06/13/2016     Lab Results   Component Value Date    CHOL 101 07/03/2017    CHOL 103 06/13/2016    CHOL 125 04/08/2015     Lab Results   Component Value Date    TRIG 128 07/03/2017    TRIG 195 06/13/2016    TRIG 169 04/08/2015     Lab Results   Component Value Date    HDL 44 10/30/2017    HDL 40 07/03/2017    HDL 38 06/13/2016     Lab Results   Component Value Date    LDLCALC 36 10/30/2017    LDLCALC 35 07/03/2017    LDLCALC 26 06/13/2016     No results found for: LABVLDL, VLDL  No results found for: CHOLHDLRATIO      Review of Systems  Constitutional: negative for chills, fatigue and fevers  Eyes: negative for irritation, redness and visual disturbance Respiratory: negative for cough, shortness of breath and wheezing  Cardiovascular: negative for chest pain, irregular heart beat and palpitations    The remainder of systems were reviewed and negative. Objective:   BP (!) 180/80   Pulse 60   Resp 24   Ht 5' 10.87\" (1.8 m)   Wt 251 lb 8 oz (114.1 kg)   BMI 35.21 kg/m²      General:  alert, appears stated age, cooperative and no distress   Oropharynx: normal findings: lips normal without lesions, buccal mucosa normal, gums healthy and tongue midline and normal    Eyes:  negative findings: lids and lashes normal, conjunctivae and sclerae normal, corneas clear and pupils equal, round, reactive to light and accomodation       Neck: no adenopathy, no carotid bruit, no JVD, supple, symmetrical, trachea midline and thyroid not enlarged, symmetric, no tenderness/mass/nodules   Thyroid:  no palpable nodule   Lung: clear to auscultation bilaterally   Heart:  regular rate and rhythm, S1, S2 normal, no murmur, click, rub or gallop and normal apical impulse   Abdomen: soft, non-tender; bowel sounds normal; no masses,  no organomegaly   Extremities: extremities normal, atraumatic, no cyanosis or edema and Homans sign is negative, no sign of DVT   Pulses: 2+ and symmetric   Skin: warm and dry, no hyperpigmentation, vitiligo, or suspicious lesions   Neuro: normal without focal findings, mental status, speech normal, alert and oriented x3, ANGIE, cranial nerves 2-12 intact, muscle tone and strength normal and symmetric. Feet not examined. Lymph nodes: There is no cervical, supraclavicular or submental adenopathy. Musculoskeletal: No joint swelling, tenderness or redness. There is no muscular hypertrophy. Psych: Patient is well groomed with appropriate behavior. Affect is normal with no evidence of anxiety or depressed mood. Insight is normal as well. Assessment/Plan:     1.  Uncontrolled type 2 diabetes mellitus with hyperglycemia, with long-term current use of insulin (HCC) : Uncontrolled. Discontinue metformin. Increase insulin to 15 units twice a day. Check blood sugar 3 times a day and send readings in one to 2 weeks. 2. Adrenal tumor: Patient has declined further investigation and treatment. 3. Essential hypertension: Uncontrolled because he did not take his medication. He is going to look for his medication bottle and take it today. If he does not find the bottle he should call me back so I can continue prescription. Patient to follow-up with his PCP in 24 hours. Go to the emergency room if he gets headaches or chest pains. No orders of the defined types were placed in this encounter. · The risks and benefits of my recommendations, as well as other treatment options were discussed with the patient today. Questions were answered. · Follow up: 3 months and as needed. If you have questions, please do not hesitate to call me. I look forward to following Tony along with you.     Sincerely,        Dhara Ballard MD

## 2017-11-30 NOTE — CARE COORDINATION
Ambulatory Care Coordination Note  11/30/2017  CM Risk Score: 3  [place] Mortality Risk Score: 49.72    ACC: Danielle Kehr, RN    Summary Note: Patient was called for continued Care Coordination follow up and education. Patient shared he is having some increased SOB this AM.  Patient reported he believes it is d/t the [employer] Khakis. \"  Patient reported he continues to have occasional cough with \"blood tinged\" sputum. Patient declined need for PCP appointment today for evaluation. Patient shared his son is with him and he as f/u already scheduled with endo for later today. Patient was reminded of the importance of early symptom recognition and to call with any change, worsening, or continuation of symptoms. Patient verbalized understanding. Patient shared he has been able to f/u with Rad. Onc. s/p recent findings of tumor and plans to start radiation therapy on Monday. Patient denied any other change in symptoms or concerns. Patient reported BS remain at his baseline and he was reminded to bring his glucometer with him to his appointment today. Patient verbalized understanding. Patient denied any other questions, concerns, or needs and he was encouraged to call with any that may develop. Patient was again reminded to call with any change or worsening of symptoms and he again verbalized understanding. Care Coordination Interventions    Program Enrollment:  Rising Risk  Referral from Primary Care Provider:  No  Suggested Interventions and Community Resources  Diabetes Education:  Completed  Fall Risk Prevention:  Completed  Medication Assistance Program:  Declined (Comment: denied need at this time )  Medi Set or Pill Pack:  Completed  Transportation Support:  Declined  Zone Management Tools:  Completed  Other Services or Interventions:   Follows with endo         Goals Addressed             Most Recent     Care Coordination Goal: Live Independent   On track (11/30/2017)             Care Coordination Goal: Independent Living  Goal: To remain independent at home    Barriers: age    Plan for overcoming my barriers: N/A  Confidence: 7/10                  Prior to Admission medications    Medication Sig Start Date End Date Taking?  Authorizing Provider   hydrALAZINE (APRESOLINE) 10 MG tablet TAKE ONE TABLET BY MOUTH 4 TIMES DAILY 10/9/17   Roland Maynard MD   ipratropium (ATROVENT) 0.02 % nebulizer solution Take 2.5 mLs by nebulization every 4 hours as needed for Wheezing 9/14/17   Roland Maynard MD   Blood Glucose Monitoring Suppl (ONE TOUCH ULTRA MINI) w/Device KIT 1 kit by Does not apply route daily Dx E11.8 8/8/17   Tab Marin MD   tamsulosin (FLOMAX) 0.4 MG capsule TAKE ONE CAPSULE BY MOUTH TWICE DAILY 7/17/17   Roland Maynard MD   metFORMIN (GLUCOPHAGE-XR) 500 MG extended release tablet TAKE ONE TABLET BY MOUTH TWICE DAILY 7/5/17   SEBASTIAN Lyons   lisinopril (PRINIVIL;ZESTRIL) 20 MG tablet TAKE ONE TABLET BY MOUTH ONCE DAILY 6/29/17   Roland Maynard MD   clopidogrel (PLAVIX) 75 MG tablet TAKE ONE TABLET BY MOUTH ONCE DAILY 6/2/17   Roland Maynard MD   Insulin Pen Needle (B-D ULTRAFINE III SHORT PEN) 31G X 8 MM MISC Inject insulin SQ twice daily (Dx: E11.8) 4/19/17   Chilango Gongora CNP   NOVOLOG MIX 70/30 FLEXPEN (70-30) 100 UNIT/ML injection INJECT 13 UNITS SUBCUTANEOUSLY TWICE DAILY WITH MEALS  Patient taking differently: INJECT 14 UNITS SUBCUTANEOUSLY TWICE DAILY WITH MEALS 4/19/17   Ligia Ryan Gongora CNP   ONE TOUCH ULTRA TEST strip Check blood sugar twice daily (Dx: E11.8) 4/19/17   Chilango Gongora CNP   terazosin (HYTRIN) 5 MG capsule TAKE ONE CAPSULE BY MOUTH ONCE EVERY NIGHT 3/30/17   Roland Maynard MD   furosemide (LASIX) 40 MG tablet TAKE ONE TABLET BY MOUTH ONCE DAILY 1/12/17   Roland Maynard MD   methenamine (HIPREX) 1 G tablet Take 1 g by mouth 2 times daily (with meals)    Historical Provider, MD   atorvastatin (LIPITOR) 80 MG tablet Take 1 tablet by mouth daily 9/29/16

## 2017-12-07 NOTE — CARE COORDINATION
Ambulatory Care Coordination Note  12/7/2017  CM Risk Score: 3  [place] Mortality Risk Score: 49.72    ACC: Luz Vance RN    Summary Note: Patient was called for continued Care Coordination follow up and education. Patient shared he has been doing \"ok\" but is tired today as he has started his radiation therapy this week and he had an early appointment today. Patient shared he feels treatments are going well and he denied any questions. Patient stated SOB continues to be more than previous baseline, but is the same as when we previously spoke. Patient continues to wear O2 at all times and he stated home pulse ox has been 94%. Patient denied any need for assistance with obtaining travel cylinders to get to and from RT treatments. Patient stated his son is assisting him as needed and providing transportation to and from appointments. Symptoms to call and report reviewed with patient along with the importance of early symptom recognition and patient acknowledged understanding. Patient was encouraged to keep f/u with PCP scheduled for Monday. Patient reported he has been monitoring BP at home daily and it has improved from recent endo appointment. Patient stated SBP has been in the 130-137 range over 70s. Patient was encouraged to bring recent readings to PCP office for review and he acknowledged understanding. Patient stated he is taking all home medications as prescribed except for metformin which was recently stopped by endo. Patient denied any other questions, concerns, or needs and he was encouraged to call with any that may develop.       Care Coordination Interventions    Program Enrollment:  Rising Risk  Referral from Primary Care Provider:  No  Suggested Interventions and Community Resources  Diabetes Education:  Completed  Fall Risk Prevention:  Completed  Medication Assistance Program:  Declined (Comment: denied need at this time )  Medi Set or Pill Pack:  Completed  Transportation Support: Declined  Zone Management Tools:  Completed  Other Services or Interventions: Follows with endo         Goals Addressed             Most Recent     Care Coordination Goal: Live Independent   On track (12/7/2017)             Care Coordination Goal: Independent Living  Goal: To remain independent at home    Barriers: age    Plan for overcoming my barriers: N/A  Confidence: 7/10                  Prior to Admission medications    Medication Sig Start Date End Date Taking? Authorizing Provider   insulin aspart protamine-insulin aspart (NOVOLOG MIX 70/30 FLEXPEN) (70-30) 100 UNIT/ML injection Inject 15 Units into the skin 2 times daily (with meals) 11/30/17  Yes Joelle Layton MD   lisinopril (PRINIVIL;ZESTRIL) 20 MG tablet TAKE ONE TABLET BY MOUTH ONCE DAILY 11/30/17  Yes Bela Mireles MD   hydrALAZINE (APRESOLINE) 10 MG tablet TAKE ONE TABLET BY MOUTH 4 TIMES DAILY 10/9/17  Yes Bela Mireles MD   ipratropium (ATROVENT) 0.02 % nebulizer solution Take 2.5 mLs by nebulization every 4 hours as needed for Wheezing 9/14/17  Yes Bela Mireles MD   tamsulosin (FLOMAX) 0.4 MG capsule TAKE ONE CAPSULE BY MOUTH TWICE DAILY 7/17/17  Yes Bela Mireles MD   clopidogrel (PLAVIX) 75 MG tablet TAKE ONE TABLET BY MOUTH ONCE DAILY 6/2/17  Yes Bela Mireles MD   terazosin (HYTRIN) 5 MG capsule TAKE ONE CAPSULE BY MOUTH ONCE EVERY NIGHT 3/30/17  Yes Bela Mireles MD   furosemide (LASIX) 40 MG tablet TAKE ONE TABLET BY MOUTH ONCE DAILY 1/12/17  Yes Bela Mireles MD   methenamine (HIPREX) 1 G tablet Take 1 g by mouth 2 times daily (with meals)   Yes Historical Provider, MD   atorvastatin (LIPITOR) 80 MG tablet Take 1 tablet by mouth daily 9/29/16  Yes Bela Mireles MD   albuterol (PROVENTIL) (2.5 MG/3ML) 0.083% nebulizer solution Take 3 mLs by nebulization every 4 hours as needed for Wheezing 4/18/16  Yes MD MAO Pitts PO Take 1 capsule by mouth daily.    Yes Historical Provider, MD   metoprolol (LOPRESSOR) 100 MG tablet symptoms      Symptoms:   CHF associated dyspnea on exertion: Pos, CHF associated shortness of breath: Pos (Comment: Pt. stated symptoms continue but denied any worsening. )      Symptom course:  stable     ,   Diabetes Assessment    Meal Planning:  Avoidance of concentrated sweets   How often do you test your blood sugar?:  Daily   Do you have barriers with adherence to non-pharmacologic self-management interventions?  (Nutrition/Exercise/Self-Monitoring):  No (Comment: limited assistance )   Have you ever had to go to the ED for symptoms of low blood sugar?:  No       No patient-reported symptoms      ,

## 2017-12-11 PROBLEM — C34.92 SQUAMOUS CELL CARCINOMA LUNG, LEFT (HCC): Status: ACTIVE | Noted: 2017-01-01

## 2017-12-17 NOTE — PROGRESS NOTES
[facility] PROFESSIONAL SERVS  [facility] FAMILY MEDICINE  [address] [facility]  Dept: [phone]  Dept Fax: [phone]  Loc: Lang Thompson is a 80 y.o. male who presents today for:  Chief Complaint   Patient presents with    Other     F/U LESION LEFT LUNG, CHF, BPH            HPI:     HPI  Here for a regular recheckup but is now having radiation treatments for lung cancer. Diabetes Mellitus: Patient presents for follow up of diabetes. Symptoms: none. Symptoms have gradually improved. Patient denies foot ulcerations, nausea, paresthesia of the feet and visual disturbances. Evaluation to date has been included: fasting blood sugar, fasting lipid panel, hemoglobin A1C and microalbuminuria. Home sugars: BGs range between 150 and 190. Treatment to date: more intensive attention to diet which has been somewhate effective. Lab Results   Component Value Date    LABA1C 6.5 (H) 07/03/2017     No results found for: EAG  He is following with Dr Pineda for DM. Hypertension: Patient here for follow-up of elevated blood pressure. He is not exercising and is adherent to low salt diet. Blood pressure is well controlled at home. Cardiac symptoms none. Patient denies dyspnea and palpitations. Cardiovascular risk factors: advanced age (older than 54 for men, 72 for women), diabetes mellitus, dyslipidemia, hypertension, male gender, sedentary lifestyle and smoking/ tobacco exposure. Use of agents associated with hypertension: none. History of target organ damage: CAD and CHF followed by cardiology. Hyperlipidemia: Patient presents with hyperlipidemia. He was tested because diabetes and hypertension.   His last labs showed   Lab Results   Component Value Date    CHOL 101 07/03/2017    CHOL 103 06/13/2016    CHOL 125 04/08/2015     Lab Results   Component Value Date    TRIG 128 07/03/2017    TRIG 195 06/13/2016    TRIG 169 04/08/2015     Lab Results   Component Value Date Future  -     Hemoglobin A1C; Future  -     Microalbumin / Creatinine Urine Ratio; Future    Essential hypertension  -     lisinopril (PRINIVIL;ZESTRIL) 30 MG tablet; TAKE ONE TABLET BY MOUTH ONCE DAILY    Chronic combined systolic and diastolic congestive heart failure (HCC)    Familial hypercholesterolemia  -     Comprehensive Metabolic Panel; Future  -     Lipid Panel; Future    CLL (chronic lymphocytic leukemia) (HCC)  -     CBC; Future    Squamous cell carcinoma lung, left (HCC)    No change to medication   Continue healthy diet and exercise  Yearly eye exam  Daily foot inspection  Yearly flu shot  Monitor glucose regularly  Daily aspirin  Regular labs : A1c quarterly, lipids every 6 months and BMP quaterly    Discussed use, benefit, and side effects of prescribed medications. All patient questions answered. Pt voiced understanding. Reviewed health maintenance. Instructed to continue current medications, diet and exercise. Patient agreed with treatment plan. Follow up as directed.      Electronically signed by Willian Garcia MD

## 2018-01-01 ENCOUNTER — APPOINTMENT (OUTPATIENT)
Dept: GENERAL RADIOLOGY | Age: 83
DRG: 291 | End: 2018-01-01
Payer: MEDICARE

## 2018-01-01 ENCOUNTER — HOSPITAL ENCOUNTER (INPATIENT)
Age: 83
LOS: 6 days | Discharge: HOME HEALTH | DRG: 291 | End: 2018-03-07
Attending: FAMILY MEDICINE | Admitting: HOSPITALIST
Payer: MEDICARE

## 2018-01-01 ENCOUNTER — HOSPITAL ENCOUNTER (OUTPATIENT)
Dept: RADIATION ONCOLOGY | Age: 83
Discharge: HOME | End: 2018-01-15
Payer: MEDICARE

## 2018-01-01 ENCOUNTER — TELEPHONE (OUTPATIENT)
Dept: FAMILY MEDICINE CLINIC | Age: 83
End: 2018-01-01

## 2018-01-01 ENCOUNTER — CARE COORDINATION (OUTPATIENT)
Dept: CASE MANAGEMENT | Age: 83
End: 2018-01-01

## 2018-01-01 ENCOUNTER — HOSPITAL ENCOUNTER (OUTPATIENT)
Dept: RADIATION ONCOLOGY | Age: 83
Discharge: HOME | End: 2018-01-08
Payer: MEDICARE

## 2018-01-01 ENCOUNTER — APPOINTMENT (OUTPATIENT)
Dept: GENERAL RADIOLOGY | Age: 83
DRG: 683 | End: 2018-01-01
Payer: MEDICARE

## 2018-01-01 ENCOUNTER — HOSPITAL ENCOUNTER (INPATIENT)
Age: 83
LOS: 15 days | Discharge: HOME | DRG: 312 | End: 2018-05-25
Attending: FAMILY MEDICINE | Admitting: FAMILY MEDICINE
Payer: MEDICARE

## 2018-01-01 ENCOUNTER — APPOINTMENT (OUTPATIENT)
Dept: CT IMAGING | Age: 83
DRG: 291 | End: 2018-01-01
Payer: MEDICARE

## 2018-01-01 ENCOUNTER — APPOINTMENT (OUTPATIENT)
Dept: ULTRASOUND IMAGING | Age: 83
DRG: 683 | End: 2018-01-01
Payer: MEDICARE

## 2018-01-01 ENCOUNTER — CARE COORDINATION (OUTPATIENT)
Dept: CARE COORDINATION | Age: 83
End: 2018-01-01

## 2018-01-01 ENCOUNTER — HOSPITAL ENCOUNTER (OUTPATIENT)
Age: 83
Discharge: HOME | End: 2018-07-12
Payer: MEDICARE

## 2018-01-01 ENCOUNTER — HOSPITAL ENCOUNTER (OUTPATIENT)
Dept: RADIATION ONCOLOGY | Age: 83
Discharge: HOME | End: 2018-02-12
Payer: MEDICARE

## 2018-01-01 ENCOUNTER — OFFICE VISIT (OUTPATIENT)
Dept: FAMILY MEDICINE CLINIC | Age: 83
End: 2018-01-01
Payer: MEDICARE

## 2018-01-01 ENCOUNTER — HOSPITAL ENCOUNTER (OUTPATIENT)
Dept: RADIATION ONCOLOGY | Age: 83
Discharge: HOME | End: 2018-01-29
Payer: MEDICARE

## 2018-01-01 ENCOUNTER — APPOINTMENT (OUTPATIENT)
Dept: CT IMAGING | Age: 83
DRG: 435 | End: 2018-01-01
Payer: MEDICARE

## 2018-01-01 ENCOUNTER — HOSPITAL ENCOUNTER (INPATIENT)
Age: 83
LOS: 1 days | DRG: 182 | End: 2018-08-01
Attending: INTERNAL MEDICINE | Admitting: INTERNAL MEDICINE
Payer: MEDICARE

## 2018-01-01 ENCOUNTER — OFFICE VISIT (OUTPATIENT)
Dept: UROLOGY | Age: 83
End: 2018-01-01
Payer: MEDICARE

## 2018-01-01 ENCOUNTER — HOSPITAL ENCOUNTER (INPATIENT)
Dept: GENERAL RADIOLOGY | Age: 83
Discharge: HOME | DRG: 291 | End: 2018-03-02
Payer: MEDICARE

## 2018-01-01 ENCOUNTER — HOSPITAL ENCOUNTER (OUTPATIENT)
Age: 83
Setting detail: SPECIMEN
Discharge: HOME | End: 2018-06-25
Payer: MEDICARE

## 2018-01-01 ENCOUNTER — APPOINTMENT (OUTPATIENT)
Dept: ULTRASOUND IMAGING | Age: 83
DRG: 291 | End: 2018-01-01
Payer: MEDICARE

## 2018-01-01 ENCOUNTER — HOSPITAL ENCOUNTER (INPATIENT)
Age: 83
LOS: 4 days | Discharge: HOSPICE | DRG: 435 | End: 2018-08-01
Attending: HOSPITALIST | Admitting: HOSPITALIST
Payer: MEDICARE

## 2018-01-01 ENCOUNTER — HOSPITAL ENCOUNTER (INPATIENT)
Age: 83
LOS: 7 days | Discharge: NURSING, REHAB OR LONG-TERM CARE | DRG: 291 | End: 2018-01-19
Attending: FAMILY MEDICINE | Admitting: HOSPITALIST
Payer: MEDICARE

## 2018-01-01 ENCOUNTER — APPOINTMENT (OUTPATIENT)
Dept: ULTRASOUND IMAGING | Age: 83
DRG: 435 | End: 2018-01-01
Payer: MEDICARE

## 2018-01-01 ENCOUNTER — HOSPITAL ENCOUNTER (OUTPATIENT)
Age: 83
Discharge: HOME | DRG: 435 | End: 2018-07-27
Payer: MEDICARE

## 2018-01-01 ENCOUNTER — HOSPITAL ENCOUNTER (OUTPATIENT)
Dept: RADIATION ONCOLOGY | Age: 83
Discharge: HOME | End: 2018-02-05
Payer: MEDICARE

## 2018-01-01 ENCOUNTER — APPOINTMENT (OUTPATIENT)
Dept: GENERAL RADIOLOGY | Age: 83
DRG: 312 | End: 2018-01-01
Attending: FAMILY MEDICINE
Payer: MEDICARE

## 2018-01-01 ENCOUNTER — APPOINTMENT (OUTPATIENT)
Dept: GENERAL RADIOLOGY | Age: 83
DRG: 435 | End: 2018-01-01
Payer: MEDICARE

## 2018-01-01 ENCOUNTER — TELEPHONE (OUTPATIENT)
Dept: ADMINISTRATIVE | Age: 83
End: 2018-01-01

## 2018-01-01 ENCOUNTER — APPOINTMENT (OUTPATIENT)
Dept: CT IMAGING | Age: 83
DRG: 683 | End: 2018-01-01
Payer: MEDICARE

## 2018-01-01 ENCOUNTER — TELEPHONE (OUTPATIENT)
Dept: UROLOGY | Age: 83
End: 2018-01-01

## 2018-01-01 ENCOUNTER — HOSPITAL ENCOUNTER (OUTPATIENT)
Dept: RADIATION ONCOLOGY | Age: 83
Discharge: HOME | End: 2018-01-02
Payer: MEDICARE

## 2018-01-01 ENCOUNTER — HOSPITAL ENCOUNTER (OUTPATIENT)
Dept: RADIATION ONCOLOGY | Age: 83
Discharge: HOME | End: 2018-01-22
Payer: MEDICARE

## 2018-01-01 ENCOUNTER — HOSPITAL ENCOUNTER (INPATIENT)
Age: 83
LOS: 4 days | Discharge: NURSING, REHAB OR LONG-TERM CARE | DRG: 683 | End: 2018-05-10
Attending: FAMILY MEDICINE | Admitting: INTERNAL MEDICINE
Payer: MEDICARE

## 2018-01-01 ENCOUNTER — APPOINTMENT (OUTPATIENT)
Dept: INTERVENTIONAL RADIOLOGY/VASCULAR | Age: 83
DRG: 683 | End: 2018-01-01
Payer: MEDICARE

## 2018-01-01 ENCOUNTER — APPOINTMENT (OUTPATIENT)
Dept: INTERVENTIONAL RADIOLOGY/VASCULAR | Age: 83
DRG: 291 | End: 2018-01-01
Payer: MEDICARE

## 2018-01-01 ENCOUNTER — CLINICAL DOCUMENTATION (OUTPATIENT)
Dept: NUTRITION | Age: 83
End: 2018-01-01

## 2018-01-01 VITALS
HEART RATE: 62 BPM | WEIGHT: 238.2 LBS | HEIGHT: 71 IN | RESPIRATION RATE: 17 BRPM | DIASTOLIC BLOOD PRESSURE: 65 MMHG | BODY MASS INDEX: 33.35 KG/M2 | SYSTOLIC BLOOD PRESSURE: 148 MMHG | OXYGEN SATURATION: 97 % | TEMPERATURE: 97.8 F

## 2018-01-01 VITALS
SYSTOLIC BLOOD PRESSURE: 138 MMHG | HEART RATE: 68 BPM | WEIGHT: 235.2 LBS | TEMPERATURE: 97.9 F | BODY MASS INDEX: 32.93 KG/M2 | RESPIRATION RATE: 20 BRPM | HEIGHT: 71 IN | DIASTOLIC BLOOD PRESSURE: 60 MMHG

## 2018-01-01 VITALS
RESPIRATION RATE: 19 BRPM | DIASTOLIC BLOOD PRESSURE: 70 MMHG | WEIGHT: 240.9 LBS | SYSTOLIC BLOOD PRESSURE: 160 MMHG | HEART RATE: 69 BPM | OXYGEN SATURATION: 93 % | HEIGHT: 72 IN | BODY MASS INDEX: 32.63 KG/M2 | TEMPERATURE: 98.1 F

## 2018-01-01 VITALS
RESPIRATION RATE: 22 BRPM | OXYGEN SATURATION: 93 % | WEIGHT: 233.6 LBS | HEIGHT: 71 IN | SYSTOLIC BLOOD PRESSURE: 157 MMHG | TEMPERATURE: 98.2 F | BODY MASS INDEX: 32.7 KG/M2 | HEART RATE: 60 BPM | DIASTOLIC BLOOD PRESSURE: 68 MMHG

## 2018-01-01 VITALS
RESPIRATION RATE: 20 BRPM | DIASTOLIC BLOOD PRESSURE: 58 MMHG | SYSTOLIC BLOOD PRESSURE: 150 MMHG | HEIGHT: 71 IN | BODY MASS INDEX: 33.04 KG/M2 | TEMPERATURE: 98.2 F | WEIGHT: 236 LBS | OXYGEN SATURATION: 94 % | HEART RATE: 62 BPM

## 2018-01-01 VITALS
DIASTOLIC BLOOD PRESSURE: 62 MMHG | SYSTOLIC BLOOD PRESSURE: 124 MMHG | HEIGHT: 71 IN | WEIGHT: 238 LBS | BODY MASS INDEX: 33.32 KG/M2

## 2018-01-01 VITALS
HEIGHT: 71 IN | SYSTOLIC BLOOD PRESSURE: 136 MMHG | BODY MASS INDEX: 33.74 KG/M2 | DIASTOLIC BLOOD PRESSURE: 56 MMHG | OXYGEN SATURATION: 90 % | HEART RATE: 60 BPM | WEIGHT: 241 LBS | RESPIRATION RATE: 18 BRPM

## 2018-01-01 VITALS
HEIGHT: 71 IN | RESPIRATION RATE: 24 BRPM | WEIGHT: 262.3 LBS | OXYGEN SATURATION: 88 % | SYSTOLIC BLOOD PRESSURE: 88 MMHG | BODY MASS INDEX: 36.72 KG/M2 | DIASTOLIC BLOOD PRESSURE: 40 MMHG | HEART RATE: 58 BPM | TEMPERATURE: 97.4 F

## 2018-01-01 VITALS
SYSTOLIC BLOOD PRESSURE: 130 MMHG | DIASTOLIC BLOOD PRESSURE: 62 MMHG | HEIGHT: 71 IN | HEART RATE: 60 BPM | BODY MASS INDEX: 33.77 KG/M2 | WEIGHT: 241.2 LBS | TEMPERATURE: 97.4 F | RESPIRATION RATE: 22 BRPM

## 2018-01-01 DIAGNOSIS — N17.9 ACUTE RENAL FAILURE, UNSPECIFIED ACUTE RENAL FAILURE TYPE (HCC): Primary | ICD-10-CM

## 2018-01-01 DIAGNOSIS — R06.00 DYSPNEA, UNSPECIFIED TYPE: ICD-10-CM

## 2018-01-01 DIAGNOSIS — R60.0 BILATERAL LOWER EXTREMITY EDEMA: ICD-10-CM

## 2018-01-01 DIAGNOSIS — J44.1 COPD EXACERBATION (HCC): ICD-10-CM

## 2018-01-01 DIAGNOSIS — J18.9 PNEUMONIA DUE TO ORGANISM: ICD-10-CM

## 2018-01-01 DIAGNOSIS — R33.9 URINARY RETENTION: Primary | ICD-10-CM

## 2018-01-01 DIAGNOSIS — C91.10 CLL (CHRONIC LYMPHOCYTIC LEUKEMIA) (HCC): ICD-10-CM

## 2018-01-01 DIAGNOSIS — Z97.8 FOLEY CATHETER IN PLACE: ICD-10-CM

## 2018-01-01 DIAGNOSIS — N18.9 ACUTE RENAL FAILURE SUPERIMPOSED ON CHRONIC KIDNEY DISEASE, UNSPECIFIED CKD STAGE, UNSPECIFIED ACUTE RENAL FAILURE TYPE (HCC): Primary | ICD-10-CM

## 2018-01-01 DIAGNOSIS — K21.9 GASTROESOPHAGEAL REFLUX DISEASE WITHOUT ESOPHAGITIS: ICD-10-CM

## 2018-01-01 DIAGNOSIS — R79.89 ELEVATED D-DIMER: ICD-10-CM

## 2018-01-01 DIAGNOSIS — D72.829 LEUKOCYTOSIS, UNSPECIFIED TYPE: ICD-10-CM

## 2018-01-01 DIAGNOSIS — N30.01 ACUTE CYSTITIS WITH HEMATURIA: Primary | ICD-10-CM

## 2018-01-01 DIAGNOSIS — R77.8 ELEVATED TROPONIN: ICD-10-CM

## 2018-01-01 DIAGNOSIS — Z79.4 UNCONTROLLED TYPE 2 DIABETES MELLITUS WITH HYPERGLYCEMIA, WITH LONG-TERM CURRENT USE OF INSULIN (HCC): ICD-10-CM

## 2018-01-01 DIAGNOSIS — C34.92 SQUAMOUS CELL CARCINOMA LUNG, LEFT (HCC): ICD-10-CM

## 2018-01-01 DIAGNOSIS — E78.01 FAMILIAL HYPERCHOLESTEROLEMIA: ICD-10-CM

## 2018-01-01 DIAGNOSIS — N17.9 ACUTE RENAL FAILURE SUPERIMPOSED ON CHRONIC KIDNEY DISEASE, UNSPECIFIED CKD STAGE, UNSPECIFIED ACUTE RENAL FAILURE TYPE (HCC): Primary | ICD-10-CM

## 2018-01-01 DIAGNOSIS — Z79.4 CONTROLLED TYPE 2 DIABETES MELLITUS WITH HYPERGLYCEMIA, WITH LONG-TERM CURRENT USE OF INSULIN (HCC): ICD-10-CM

## 2018-01-01 DIAGNOSIS — C78.7 LIVER METASTASES (HCC): ICD-10-CM

## 2018-01-01 DIAGNOSIS — J18.9 PNEUMONIA DUE TO ORGANISM: Primary | ICD-10-CM

## 2018-01-01 DIAGNOSIS — J40 BRONCHITIS: ICD-10-CM

## 2018-01-01 DIAGNOSIS — I50.42 CHRONIC COMBINED SYSTOLIC AND DIASTOLIC CONGESTIVE HEART FAILURE (HCC): Primary | ICD-10-CM

## 2018-01-01 DIAGNOSIS — M15.9 PRIMARY OSTEOARTHRITIS INVOLVING MULTIPLE JOINTS: Primary | ICD-10-CM

## 2018-01-01 DIAGNOSIS — I50.33 ACUTE ON CHRONIC DIASTOLIC HEART FAILURE (HCC): ICD-10-CM

## 2018-01-01 DIAGNOSIS — N40.0 BENIGN PROSTATIC HYPERPLASIA WITHOUT LOWER URINARY TRACT SYMPTOMS: ICD-10-CM

## 2018-01-01 DIAGNOSIS — Z79.4 TYPE 2 DIABETES MELLITUS WITH STAGE 3 CHRONIC KIDNEY DISEASE, WITH LONG-TERM CURRENT USE OF INSULIN (HCC): ICD-10-CM

## 2018-01-01 DIAGNOSIS — N18.30 TYPE 2 DIABETES MELLITUS WITH STAGE 3 CHRONIC KIDNEY DISEASE, WITH LONG-TERM CURRENT USE OF INSULIN (HCC): ICD-10-CM

## 2018-01-01 DIAGNOSIS — E11.65 CONTROLLED TYPE 2 DIABETES MELLITUS WITH HYPERGLYCEMIA, WITH LONG-TERM CURRENT USE OF INSULIN (HCC): ICD-10-CM

## 2018-01-01 DIAGNOSIS — E11.65 UNCONTROLLED TYPE 2 DIABETES MELLITUS WITH HYPERGLYCEMIA, WITH LONG-TERM CURRENT USE OF INSULIN (HCC): ICD-10-CM

## 2018-01-01 DIAGNOSIS — J96.01 ACUTE RESPIRATORY FAILURE WITH HYPOXIA (HCC): ICD-10-CM

## 2018-01-01 DIAGNOSIS — R33.9 URINARY RETENTION WITH INCOMPLETE BLADDER EMPTYING: ICD-10-CM

## 2018-01-01 DIAGNOSIS — J96.02 ACUTE HYPERCAPNIC RESPIRATORY FAILURE (HCC): ICD-10-CM

## 2018-01-01 DIAGNOSIS — J96.21 ACUTE ON CHRONIC RESPIRATORY FAILURE WITH HYPOXIA (HCC): ICD-10-CM

## 2018-01-01 DIAGNOSIS — C34.92 MALIGNANT NEOPLASM OF LEFT LUNG, UNSPECIFIED PART OF LUNG (HCC): Primary | ICD-10-CM

## 2018-01-01 DIAGNOSIS — N17.9 ACUTE RENAL FAILURE, UNSPECIFIED ACUTE RENAL FAILURE TYPE (HCC): ICD-10-CM

## 2018-01-01 DIAGNOSIS — N18.30 CHRONIC RENAL FAILURE, STAGE 3 (MODERATE) (HCC): ICD-10-CM

## 2018-01-01 DIAGNOSIS — R79.89 ELEVATED BRAIN NATRIURETIC PEPTIDE (BNP) LEVEL: ICD-10-CM

## 2018-01-01 DIAGNOSIS — I10 ESSENTIAL HYPERTENSION: ICD-10-CM

## 2018-01-01 DIAGNOSIS — J10.1 INFLUENZA B: ICD-10-CM

## 2018-01-01 DIAGNOSIS — E66.9 OBESITY (BMI 30-39.9): ICD-10-CM

## 2018-01-01 DIAGNOSIS — R18.0 MALIGNANT ASCITES: ICD-10-CM

## 2018-01-01 DIAGNOSIS — J10.1 INFLUENZA B: Primary | ICD-10-CM

## 2018-01-01 DIAGNOSIS — R33.8 ACUTE URINARY RETENTION: ICD-10-CM

## 2018-01-01 DIAGNOSIS — E11.22 TYPE 2 DIABETES MELLITUS WITH STAGE 3 CHRONIC KIDNEY DISEASE, WITH LONG-TERM CURRENT USE OF INSULIN (HCC): ICD-10-CM
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
## 2018-01-01 DIAGNOSIS — E79.0 ELEVATED URIC ACID IN BLOOD: ICD-10-CM

## 2018-01-01 LAB
ACINETOBACTER BAUMANNII FILM ARRAY: NOT DETECTED
AFB CULTURE & SMEAR: NORMAL
AFP-TUMOR MARKER: 0.6 UG/L
ALBUMIN FLUID: 0.3 GM/DL
ALBUMIN SERPL-MCNC: 2.1 G/DL (ref 3.5–5.1)
ALBUMIN SERPL-MCNC: 2.1 G/DL (ref 3.5–5.1)
ALBUMIN SERPL-MCNC: 2.3 G/DL (ref 3.5–5.1)
ALBUMIN SERPL-MCNC: 2.6 G/DL (ref 3.5–5.1)
ALBUMIN SERPL-MCNC: 2.7 G/DL (ref 3.5–5.1)
ALBUMIN SERPL-MCNC: 2.7 G/DL (ref 3.5–5.1)
ALBUMIN SERPL-MCNC: 2.8 G/DL (ref 3.5–5.1)
ALBUMIN SERPL-MCNC: 2.8 G/DL (ref 3.5–5.1)
ALBUMIN SERPL-MCNC: 2.9 G/DL (ref 3.5–5.1)
ALBUMIN SERPL-MCNC: 2.9 G/DL (ref 3.5–5.1)
ALBUMIN SERPL-MCNC: 3 G/DL (ref 3.5–5.1)
ALBUMIN SERPL-MCNC: 3.2 G/DL (ref 3.5–5.1)
ALBUMIN SERPL-MCNC: 3.3 G/DL (ref 3.5–5.1)
ALLEN TEST: POSITIVE
ALP BLD-CCNC: 134 U/L (ref 38–126)
ALP BLD-CCNC: 326 U/L (ref 38–126)
ALP BLD-CCNC: 545 U/L (ref 38–126)
ALP BLD-CCNC: 589 U/L (ref 38–126)
ALP BLD-CCNC: 65 U/L (ref 38–126)
ALP BLD-CCNC: 684 U/L (ref 38–126)
ALP BLD-CCNC: 691 U/L (ref 38–126)
ALP BLD-CCNC: 815 U/L (ref 38–126)
ALT SERPL-CCNC: 104 U/L (ref 11–66)
ALT SERPL-CCNC: 112 U/L (ref 11–66)
ALT SERPL-CCNC: 113 U/L (ref 11–66)
ALT SERPL-CCNC: 119 U/L (ref 11–66)
ALT SERPL-CCNC: 127 U/L (ref 11–66)
ALT SERPL-CCNC: 21 U/L (ref 11–66)
ALT SERPL-CCNC: 27 U/L (ref 11–66)
ALT SERPL-CCNC: 53 U/L (ref 11–66)
AMMONIA: 101 UMOL/L (ref 11–60)
AMMONIA: 43 UMOL/L (ref 11–60)
AMMONIA: 64 UMOL/L (ref 11–60)
AMMONIA: 76 UMOL/L (ref 11–60)
AMORPHOUS: ABNORMAL
AMORPHOUS: ABNORMAL
AMPHETAMINE+METHAMPHETAMINE URINE SCREEN: NEGATIVE
ANAEROBIC CULTURE: NORMAL
ANAEROBIC CULTURE: NORMAL
ANION GAP SERPL CALCULATED.3IONS-SCNC: 10 MEQ/L (ref 8–16)
ANION GAP SERPL CALCULATED.3IONS-SCNC: 11 MEQ/L (ref 8–16)
ANION GAP SERPL CALCULATED.3IONS-SCNC: 12 MEQ/L (ref 8–16)
ANION GAP SERPL CALCULATED.3IONS-SCNC: 13 MEQ/L (ref 8–16)
ANION GAP SERPL CALCULATED.3IONS-SCNC: 15 MEQ/L (ref 8–16)
ANION GAP SERPL CALCULATED.3IONS-SCNC: 15 MEQ/L (ref 8–16)
ANION GAP SERPL CALCULATED.3IONS-SCNC: 16 MEQ/L (ref 8–16)
ANION GAP SERPL CALCULATED.3IONS-SCNC: 17 MEQ/L (ref 8–16)
ANION GAP SERPL CALCULATED.3IONS-SCNC: 5 MEQ/L (ref 8–16)
ANION GAP SERPL CALCULATED.3IONS-SCNC: 6 MEQ/L (ref 8–16)
ANION GAP SERPL CALCULATED.3IONS-SCNC: 7 MEQ/L (ref 8–16)
ANION GAP SERPL CALCULATED.3IONS-SCNC: 8 MEQ/L (ref 8–16)
ANION GAP SERPL CALCULATED.3IONS-SCNC: 8 MEQ/L (ref 8–16)
ANION GAP SERPL CALCULATED.3IONS-SCNC: 9 MEQ/L (ref 8–16)
ANION GAP SERPL CALCULATED.3IONS-SCNC: 9 MEQ/L (ref 8–16)
ANION GAP: 4 MEQ/L (ref 8–16)
ANISOCYTOSIS: ABNORMAL
AST SERPL-CCNC: 117 U/L (ref 5–40)
AST SERPL-CCNC: 120 U/L (ref 5–40)
AST SERPL-CCNC: 120 U/L (ref 5–40)
AST SERPL-CCNC: 137 U/L (ref 5–40)
AST SERPL-CCNC: 14 U/L (ref 5–40)
AST SERPL-CCNC: 150 U/L (ref 5–40)
AST SERPL-CCNC: 21 U/L (ref 5–40)
AST SERPL-CCNC: 42 U/L (ref 5–40)
ATYPICAL LYMPHOCYTES: ABNORMAL %
AVERAGE GLUCOSE: 135 MG/DL (ref 70–126)
BACTERIA: ABNORMAL
BACTERIA: ABNORMAL
BACTERIA: ABNORMAL /HPF
BARBITURATE QUANTITATIVE URINE: NEGATIVE
BASE EXCESS (CALCULATED): 13.6 MMOL/L (ref -2.5–2.5)
BASE EXCESS (CALCULATED): 19.2 MMOL/L (ref -2.5–2.5)
BASE EXCESS (CALCULATED): 9.9 MMOL/L (ref -2.5–2.5)
BASE EXCESS MIXED: 11.2 MMOL/L (ref -2–3)
BASOPHILIA: SLIGHT
BASOPHILIC STIPPLING: SLIGHT
BASOPHILIC STIPPLING: SLIGHT
BASOPHILS # BLD: 0.1 %
BASOPHILS # BLD: 0.2 %
BASOPHILS # BLD: 0.4 %
BASOPHILS # BLD: 0.4 %
BASOPHILS # BLD: 0.5 %
BASOPHILS # BLD: 0.5 % (ref 0–3)
BASOPHILS # BLD: 0.6 %
BASOPHILS # BLD: 0.8 %
BASOPHILS # BLD: 0.9 %
BASOPHILS ABSOLUTE: 0 THOU/MM3 (ref 0–0.1)
BASOPHILS ABSOLUTE: 0.1 THOU/MM3 (ref 0–0.1)
BENZODIAZEPINE QUANTITATIVE URINE: NEGATIVE
BILIRUB SERPL-MCNC: 0.4 MG/DL (ref 0.3–1.2)
BILIRUB SERPL-MCNC: 0.5 MG/DL (ref 0.3–1.2)
BILIRUB SERPL-MCNC: 0.6 MG/DL (ref 0.3–1.2)
BILIRUB SERPL-MCNC: 1.4 MG/DL (ref 0.3–1.2)
BILIRUB SERPL-MCNC: 1.5 MG/DL (ref 0.3–1.2)
BILIRUB SERPL-MCNC: 1.5 MG/DL (ref 0.3–1.2)
BILIRUB SERPL-MCNC: 1.6 MG/DL (ref 0.3–1.2)
BILIRUB SERPL-MCNC: 1.6 MG/DL (ref 0.3–1.2)
BILIRUBIN DIRECT: 1.1 MG/DL (ref 0–0.3)
BILIRUBIN DIRECT: 1.1 MG/DL (ref 0–0.3)
BILIRUBIN DIRECT: 1.2 MG/DL (ref 0–0.3)
BILIRUBIN DIRECT: 1.2 MG/DL (ref 0–0.3)
BILIRUBIN DIRECT: < 0.2 MG/DL (ref 0–0.3)
BILIRUBIN URINE: ABNORMAL
BILIRUBIN URINE: ABNORMAL
BILIRUBIN URINE: NEGATIVE
BLOOD CULTURE, ROUTINE: ABNORMAL
BLOOD CULTURE, ROUTINE: ABNORMAL
BLOOD CULTURE, ROUTINE: NORMAL
BLOOD, URINE: ABNORMAL
BLOOD, URINE: NEGATIVE
BLOOD, URINE: NEGATIVE
BODY FLUID CULTURE, STERILE: NORMAL
BODY FLUID CULTURE, STERILE: NORMAL
BODY FLUID RBC: < 2000 /CUMM
BOTTLE TYPE: NORMAL
BUN BLDV-MCNC: 26 MG/DL (ref 7–22)
BUN BLDV-MCNC: 28 MG/DL (ref 7–22)
BUN BLDV-MCNC: 29 MG/DL (ref 7–22)
BUN BLDV-MCNC: 29 MG/DL (ref 7–22)
BUN BLDV-MCNC: 30 MG/DL (ref 7–22)
BUN BLDV-MCNC: 32 MG/DL (ref 7–22)
BUN BLDV-MCNC: 33 MG/DL (ref 7–18)
BUN BLDV-MCNC: 36 MG/DL (ref 7–22)
BUN BLDV-MCNC: 36 MG/DL (ref 7–22)
BUN BLDV-MCNC: 39 MG/DL (ref 7–22)
BUN BLDV-MCNC: 40 MG/DL (ref 7–22)
BUN BLDV-MCNC: 40 MG/DL (ref 7–22)
BUN BLDV-MCNC: 41 MG/DL (ref 7–22)
BUN BLDV-MCNC: 55 MG/DL (ref 7–22)
BUN BLDV-MCNC: 62 MG/DL (ref 7–22)
BUN BLDV-MCNC: 65 MG/DL (ref 7–22)
BUN BLDV-MCNC: 65 MG/DL (ref 7–22)
BUN BLDV-MCNC: 69 MG/DL (ref 7–22)
BUN BLDV-MCNC: 70 MG/DL (ref 7–22)
BUN BLDV-MCNC: 70 MG/DL (ref 7–22)
BUN BLDV-MCNC: 77 MG/DL (ref 7–22)
BUN BLDV-MCNC: 77 MG/DL (ref 7–22)
BUN BLDV-MCNC: 79 MG/DL (ref 7–22)
CALCIUM SERPL-MCNC: 7.9 MG/DL (ref 8.5–10.5)
CALCIUM SERPL-MCNC: 8.1 MG/DL (ref 8.5–10.5)
CALCIUM SERPL-MCNC: 8.2 MG/DL (ref 8.5–10.5)
CALCIUM SERPL-MCNC: 8.2 MG/DL (ref 8.5–10.5)
CALCIUM SERPL-MCNC: 8.4 MG/DL (ref 8.5–10.5)
CALCIUM SERPL-MCNC: 8.5 MG/DL (ref 8.5–10.5)
CALCIUM SERPL-MCNC: 8.6 MG/DL (ref 8.5–10.5)
CALCIUM SERPL-MCNC: 8.7 MG/DL (ref 8.5–10.5)
CALCIUM SERPL-MCNC: 8.8 MG/DL (ref 8.5–10.5)
CALCIUM SERPL-MCNC: 8.8 MG/DL (ref 8.5–10.5)
CALCIUM SERPL-MCNC: 8.9 MG/DL (ref 8.5–10.5)
CALCIUM SERPL-MCNC: 9 MG/DL (ref 8.5–10.5)
CALCIUM SERPL-MCNC: 9.1 MG/DL (ref 8.5–10.5)
CALCIUM SERPL-MCNC: 9.1 MG/DL (ref 8.5–10.5)
CALCIUM SERPL-MCNC: 9.2 MG/DL (ref 8.5–10.5)
CALCIUM SERPL-MCNC: 9.3 MG/DL (ref 8.5–10.5)
CALCIUM SERPL-MCNC: 9.3 MG/DL (ref 8.5–10.5)
CALCIUM SERPL-MCNC: 9.4 MG/DL (ref 8.5–10.5)
CALCIUM SERPL-MCNC: 9.5 MG/DL (ref 8.5–10.5)
CALCIUM SERPL-MCNC: 9.7 MG/DL (ref 8.5–10.5)
CALCIUM SERPL-MCNC: 9.9 MG/DL (ref 8.5–10.5)
CANDIDA ALBICANS FILM ARRAY: NOT DETECTED
CANDIDA GLABRATA FILM ARRAY: NOT DETECTED
CANDIDA KRUSEI FILM ARRAY: NOT DETECTED
CANDIDA PARAPSILOSIS FILM ARRAY: NOT DETECTED
CANDIDA TROPICALIS FILM ARRAY: NOT DETECTED
CANNABINOID QUANTITATIVE URINE: NEGATIVE
CARBAPENEM RESITANT FILM ARRAY: NORMAL
CASTS 2: ABNORMAL /LPF
CASTS UA: ABNORMAL /LPF
CASTS: ABNORMAL /LPF
CASTS: ABNORMAL /LPF
CHARACTER, BODY FLUID: ABNORMAL
CHARACTER, BODY FLUID: CLEAR
CHARACTER, BODY FLUID: CLEAR
CHARACTER, URINE: ABNORMAL
CHLORIDE BLD-SCNC: 100 MEQ/L (ref 98–111)
CHLORIDE BLD-SCNC: 101 MEQ/L (ref 98–111)
CHLORIDE BLD-SCNC: 102 MEQ/L (ref 98–111)
CHLORIDE BLD-SCNC: 102 MEQ/L (ref 98–111)
CHLORIDE BLD-SCNC: 103 MEQ/L (ref 98–111)
CHLORIDE BLD-SCNC: 103 MEQ/L (ref 98–111)
CHLORIDE BLD-SCNC: 104 MEQ/L (ref 98–111)
CHLORIDE BLD-SCNC: 105 MEQ/L (ref 98–107)
CHLORIDE BLD-SCNC: 105 MEQ/L (ref 98–111)
CHLORIDE BLD-SCNC: 105 MEQ/L (ref 98–111)
CHLORIDE BLD-SCNC: 106 MEQ/L (ref 98–111)
CHLORIDE BLD-SCNC: 109 MEQ/L (ref 98–111)
CHLORIDE BLD-SCNC: 92 MEQ/L (ref 98–111)
CHLORIDE BLD-SCNC: 93 MEQ/L (ref 98–111)
CHLORIDE BLD-SCNC: 95 MEQ/L (ref 98–111)
CHLORIDE BLD-SCNC: 95 MEQ/L (ref 98–111)
CHLORIDE BLD-SCNC: 96 MEQ/L (ref 98–111)
CHLORIDE BLD-SCNC: 97 MEQ/L (ref 98–111)
CHLORIDE BLD-SCNC: 97 MEQ/L (ref 98–111)
CHLORIDE BLD-SCNC: 98 MEQ/L (ref 98–111)
CHLORIDE BLD-SCNC: 98 MEQ/L (ref 98–111)
CHLORIDE BLD-SCNC: 99 MEQ/L (ref 98–111)
CHLORIDE BLD-SCNC: 99 MEQ/L (ref 98–111)
CHLORIDE, URINE: 49 MEQ/L
CHLORIDE, URINE: < 20 MEQ/L
CO2: 22 MEQ/L (ref 23–33)
CO2: 23 MEQ/L (ref 23–33)
CO2: 23 MEQ/L (ref 23–33)
CO2: 24 MEQ/L (ref 23–33)
CO2: 25 MEQ/L (ref 23–33)
CO2: 26 MEQ/L (ref 23–33)
CO2: 27 MEQ/L (ref 23–33)
CO2: 28 MEQ/L (ref 23–33)
CO2: 28 MEQ/L (ref 23–33)
CO2: 29 MEQ/L (ref 23–33)
CO2: 31 MEQ/L (ref 23–33)
CO2: 32 MEQ/L (ref 23–33)
CO2: 33 MEQ/L (ref 23–33)
CO2: 34 MEQ/L (ref 23–33)
CO2: 35 MEQ/L (ref 21–32)
CO2: 37 MEQ/L (ref 23–33)
CO2: 37 MEQ/L (ref 23–33)
CO2: 38 MEQ/L (ref 23–33)
CO2: 39 MEQ/L (ref 23–33)
CO2: 42 MEQ/L (ref 23–33)
CO2: 45 MEQ/L (ref 23–33)
COCAINE METABOLITE QUANTITATIVE URINE: NEGATIVE
COLLECTED BY:: ABNORMAL
COLOR: ABNORMAL
COLOR: NORMAL
COLOR: YELLOW
CREAT SERPL-MCNC: 0.9 MG/DL (ref 0.4–1.2)
CREAT SERPL-MCNC: 1 MG/DL (ref 0.4–1.2)
CREAT SERPL-MCNC: 1.1 MG/DL (ref 0.4–1.2)
CREAT SERPL-MCNC: 1.2 MG/DL (ref 0.4–1.2)
CREAT SERPL-MCNC: 1.3 MG/DL (ref 0.4–1.2)
CREAT SERPL-MCNC: 1.4 MG/DL (ref 0.4–1.2)
CREAT SERPL-MCNC: 1.4 MG/DL (ref 0.6–1.3)
CREAT SERPL-MCNC: 1.5 MG/DL (ref 0.4–1.2)
CREAT SERPL-MCNC: 1.6 MG/DL (ref 0.4–1.2)
CREAT SERPL-MCNC: 1.7 MG/DL (ref 0.4–1.2)
CREAT SERPL-MCNC: 1.8 MG/DL (ref 0.4–1.2)
CREAT SERPL-MCNC: 1.9 MG/DL (ref 0.4–1.2)
CREAT SERPL-MCNC: 3.5 MG/DL (ref 0.4–1.2)
CREAT SERPL-MCNC: 3.6 MG/DL (ref 0.4–1.2)
CREAT SERPL-MCNC: 3.9 MG/DL (ref 0.4–1.2)
CREAT SERPL-MCNC: 4.5 MG/DL (ref 0.4–1.2)
CREAT SERPL-MCNC: 5 MG/DL (ref 0.4–1.2)
CREATININE URINE: 165.2 MG/DL
CREATININE URINE: 48.7 MG/DL
CRYSTALS, UA: ABNORMAL
CRYSTALS: ABNORMAL
DEVICE: ABNORMAL
DIFFERENTIAL TYPE: ABNORMAL
EKG ATRIAL RATE: 60 BPM
EKG ATRIAL RATE: 75 BPM
EKG P AXIS: -19 DEGREES
EKG P AXIS: -25 DEGREES
EKG P AXIS: 7 DEGREES
EKG P AXIS: 75 DEGREES
EKG P-R INTERVAL: 122 MS
EKG P-R INTERVAL: 140 MS
EKG P-R INTERVAL: 236 MS
EKG P-R INTERVAL: 246 MS
EKG P-R INTERVAL: 246 MS
EKG Q-T INTERVAL: 428 MS
EKG Q-T INTERVAL: 464 MS
EKG Q-T INTERVAL: 478 MS
EKG Q-T INTERVAL: 482 MS
EKG Q-T INTERVAL: 500 MS
EKG QRS DURATION: 168 MS
EKG QRS DURATION: 172 MS
EKG QRS DURATION: 174 MS
EKG QRS DURATION: 180 MS
EKG QRS DURATION: 184 MS
EKG QTC CALCULATION (BAZETT): 464 MS
EKG QTC CALCULATION (BAZETT): 477 MS
EKG QTC CALCULATION (BAZETT): 478 MS
EKG QTC CALCULATION (BAZETT): 482 MS
EKG QTC CALCULATION (BAZETT): 500 MS
EKG R AXIS: 112 DEGREES
EKG R AXIS: 55 DEGREES
EKG R AXIS: 59 DEGREES
EKG R AXIS: 72 DEGREES
EKG R AXIS: 96 DEGREES
EKG T AXIS: 112 DEGREES
EKG T AXIS: 12 DEGREES
EKG T AXIS: 33 DEGREES
EKG T AXIS: 52 DEGREES
EKG T AXIS: 60 DEGREES
EKG VENTRICULAR RATE: 60 BPM
EKG VENTRICULAR RATE: 75 BPM
ELLIPTOCYTES: ABNORMAL
ENTERBACTER CLOACAE FILM ARRAY: NOT DETECTED
ENTERBACTERIACEAE FILM ARRAY: NOT DETECTED
ENTEROCOCCUS FILM ARRAY: NOT DETECTED
EOSINOPHIL # BLD: 0.2 %
EOSINOPHIL # BLD: 0.3 %
EOSINOPHIL # BLD: 0.5 %
EOSINOPHIL # BLD: 0.7 %
EOSINOPHIL # BLD: 0.8 %
EOSINOPHIL # BLD: 1 %
EOSINOPHIL # BLD: 1.1 %
EOSINOPHIL # BLD: 1.2 %
EOSINOPHIL # BLD: 1.2 %
EOSINOPHIL # BLD: 1.3 %
EOSINOPHIL # BLD: 2.3 %
EOSINOPHIL # BLD: 2.7 %
EOSINOPHIL # BLD: 3.5 %
EOSINOPHIL # BLD: 3.9 %
EOSINOPHIL # BLD: 4.1 %
EOSINOPHIL # BLD: 4.7 %
EOSINOPHIL SMEAR: NORMAL
EOSINOPHIL SMEAR: NORMAL
EOSINOPHILS ABSOLUTE: 0 THOU/MM3 (ref 0–0.4)
EOSINOPHILS ABSOLUTE: 0 THOU/MM3 (ref 0–0.4)
EOSINOPHILS ABSOLUTE: 0.1 THOU/MM3 (ref 0–0.4)
EOSINOPHILS ABSOLUTE: 0.3 THOU/MM3 (ref 0–0.4)
EOSINOPHILS ABSOLUTE: 0.4 THOU/MM3 (ref 0–0.4)
EOSINOPHILS ABSOLUTE: 0.4 THOU/MM3 (ref 0–0.4)
EOSINOPHILS RELATIVE PERCENT: 2.1 % (ref 0–4)
EPITHELIAL CELLS, UA: ABNORMAL /HPF
ERYTHROCYTE [DISTWIDTH] IN BLOOD BY AUTOMATED COUNT: 16.3 % (ref 11.5–14.5)
ERYTHROCYTE [DISTWIDTH] IN BLOOD BY AUTOMATED COUNT: 16.3 % (ref 11.5–14.5)
ERYTHROCYTE [DISTWIDTH] IN BLOOD BY AUTOMATED COUNT: 16.4 % (ref 11.5–14.5)
ERYTHROCYTE [DISTWIDTH] IN BLOOD BY AUTOMATED COUNT: 16.5 % (ref 11.5–14.5)
ERYTHROCYTE [DISTWIDTH] IN BLOOD BY AUTOMATED COUNT: 57.1 FL (ref 35–45)
ERYTHROCYTE [DISTWIDTH] IN BLOOD BY AUTOMATED COUNT: 58.2 FL (ref 35–45)
ERYTHROCYTE [DISTWIDTH] IN BLOOD BY AUTOMATED COUNT: 58.4 FL (ref 35–45)
ERYTHROCYTE [DISTWIDTH] IN BLOOD BY AUTOMATED COUNT: 59.3 FL (ref 35–45)
ESCHERICHIA COLI FILM ARRAY: NOT DETECTED
FIO2, MIXED VENOUS: 35
FLU A ANTIGEN: NEGATIVE
FLU A ANTIGEN: NEGATIVE
FLU B ANTIGEN: NEGATIVE
FLU B ANTIGEN: POSITIVE
FUNGUS IDENTIFIED: NORMAL
FUNGUS SMEAR: NORMAL
GFR SERPL CREATININE-BSD FRML MDRD: 11 ML/MIN/1.73M2
GFR SERPL CREATININE-BSD FRML MDRD: 12 ML/MIN/1.73M2
GFR SERPL CREATININE-BSD FRML MDRD: 15 ML/MIN/1.73M2
GFR SERPL CREATININE-BSD FRML MDRD: 16 ML/MIN/1.73M2
GFR SERPL CREATININE-BSD FRML MDRD: 17 ML/MIN/1.73M2
GFR SERPL CREATININE-BSD FRML MDRD: 33 ML/MIN/1.73M2
GFR SERPL CREATININE-BSD FRML MDRD: 36 ML/MIN/1.73M2
GFR SERPL CREATININE-BSD FRML MDRD: 38 ML/MIN/1.73M2
GFR SERPL CREATININE-BSD FRML MDRD: 41 ML/MIN/1.73M2
GFR SERPL CREATININE-BSD FRML MDRD: 44 ML/MIN/1.73M2
GFR SERPL CREATININE-BSD FRML MDRD: 48 ML/MIN/1.73M2
GFR SERPL CREATININE-BSD FRML MDRD: 52 ML/MIN/1.73M2
GFR SERPL CREATININE-BSD FRML MDRD: 57 ML/MIN/1.73M2
GFR SERPL CREATININE-BSD FRML MDRD: 63 ML/MIN/1.73M2
GFR SERPL CREATININE-BSD FRML MDRD: 70 ML/MIN/1.73M2
GFR SERPL CREATININE-BSD FRML MDRD: 79 ML/MIN/1.73M2
GFR, ESTIMATED: 51 ML/MIN/1.73M2
GLUCOSE BLD-MCNC: 100 MG/DL (ref 70–108)
GLUCOSE BLD-MCNC: 102 MG/DL (ref 70–108)
GLUCOSE BLD-MCNC: 104 MG/DL (ref 70–108)
GLUCOSE BLD-MCNC: 104 MG/DL (ref 70–108)
GLUCOSE BLD-MCNC: 105 MG/DL (ref 70–108)
GLUCOSE BLD-MCNC: 106 MG/DL (ref 70–108)
GLUCOSE BLD-MCNC: 109 MG/DL (ref 70–108)
GLUCOSE BLD-MCNC: 109 MG/DL (ref 70–108)
GLUCOSE BLD-MCNC: 111 MG/DL (ref 70–108)
GLUCOSE BLD-MCNC: 112 MG/DL (ref 70–108)
GLUCOSE BLD-MCNC: 113 MG/DL (ref 70–108)
GLUCOSE BLD-MCNC: 113 MG/DL (ref 70–108)
GLUCOSE BLD-MCNC: 114 MG/DL (ref 70–108)
GLUCOSE BLD-MCNC: 115 MG/DL (ref 70–108)
GLUCOSE BLD-MCNC: 116 MG/DL (ref 70–108)
GLUCOSE BLD-MCNC: 118 MG/DL (ref 70–108)
GLUCOSE BLD-MCNC: 119 MG/DL (ref 70–108)
GLUCOSE BLD-MCNC: 120 MG/DL (ref 70–108)
GLUCOSE BLD-MCNC: 121 MG/DL (ref 70–108)
GLUCOSE BLD-MCNC: 122 MG/DL (ref 70–108)
GLUCOSE BLD-MCNC: 122 MG/DL (ref 70–108)
GLUCOSE BLD-MCNC: 123 MG/DL (ref 70–108)
GLUCOSE BLD-MCNC: 124 MG/DL (ref 70–108)
GLUCOSE BLD-MCNC: 125 MG/DL (ref 70–108)
GLUCOSE BLD-MCNC: 127 MG/DL (ref 70–108)
GLUCOSE BLD-MCNC: 127 MG/DL (ref 70–108)
GLUCOSE BLD-MCNC: 128 MG/DL (ref 70–108)
GLUCOSE BLD-MCNC: 130 MG/DL (ref 70–108)
GLUCOSE BLD-MCNC: 130 MG/DL (ref 70–108)
GLUCOSE BLD-MCNC: 132 MG/DL (ref 70–108)
GLUCOSE BLD-MCNC: 133 MG/DL (ref 70–108)
GLUCOSE BLD-MCNC: 134 MG/DL (ref 70–108)
GLUCOSE BLD-MCNC: 135 MG/DL (ref 70–108)
GLUCOSE BLD-MCNC: 135 MG/DL (ref 70–108)
GLUCOSE BLD-MCNC: 136 MG/DL (ref 70–108)
GLUCOSE BLD-MCNC: 136 MG/DL (ref 70–108)
GLUCOSE BLD-MCNC: 137 MG/DL (ref 70–108)
GLUCOSE BLD-MCNC: 138 MG/DL (ref 70–108)
GLUCOSE BLD-MCNC: 139 MG/DL (ref 70–108)
GLUCOSE BLD-MCNC: 140 MG/DL (ref 70–108)
GLUCOSE BLD-MCNC: 142 MG/DL (ref 70–108)
GLUCOSE BLD-MCNC: 142 MG/DL (ref 70–108)
GLUCOSE BLD-MCNC: 143 MG/DL (ref 70–108)
GLUCOSE BLD-MCNC: 144 MG/DL (ref 70–108)
GLUCOSE BLD-MCNC: 145 MG/DL (ref 70–108)
GLUCOSE BLD-MCNC: 146 MG/DL (ref 70–108)
GLUCOSE BLD-MCNC: 146 MG/DL (ref 70–108)
GLUCOSE BLD-MCNC: 147 MG/DL (ref 70–108)
GLUCOSE BLD-MCNC: 147 MG/DL (ref 70–108)
GLUCOSE BLD-MCNC: 148 MG/DL (ref 70–108)
GLUCOSE BLD-MCNC: 149 MG/DL (ref 70–108)
GLUCOSE BLD-MCNC: 149 MG/DL (ref 70–108)
GLUCOSE BLD-MCNC: 151 MG/DL (ref 70–108)
GLUCOSE BLD-MCNC: 153 MG/DL (ref 70–108)
GLUCOSE BLD-MCNC: 154 MG/DL (ref 70–108)
GLUCOSE BLD-MCNC: 154 MG/DL (ref 70–108)
GLUCOSE BLD-MCNC: 155 MG/DL (ref 70–108)
GLUCOSE BLD-MCNC: 156 MG/DL (ref 70–108)
GLUCOSE BLD-MCNC: 156 MG/DL (ref 70–108)
GLUCOSE BLD-MCNC: 157 MG/DL (ref 70–108)
GLUCOSE BLD-MCNC: 158 MG/DL (ref 70–108)
GLUCOSE BLD-MCNC: 159 MG/DL (ref 70–108)
GLUCOSE BLD-MCNC: 161 MG/DL (ref 70–108)
GLUCOSE BLD-MCNC: 161 MG/DL (ref 70–108)
GLUCOSE BLD-MCNC: 163 MG/DL (ref 70–108)
GLUCOSE BLD-MCNC: 163 MG/DL (ref 74–106)
GLUCOSE BLD-MCNC: 164 MG/DL (ref 70–108)
GLUCOSE BLD-MCNC: 165 MG/DL (ref 70–108)
GLUCOSE BLD-MCNC: 166 MG/DL (ref 70–108)
GLUCOSE BLD-MCNC: 167 MG/DL (ref 70–108)
GLUCOSE BLD-MCNC: 169 MG/DL (ref 70–108)
GLUCOSE BLD-MCNC: 169 MG/DL (ref 70–108)
GLUCOSE BLD-MCNC: 171 MG/DL (ref 70–108)
GLUCOSE BLD-MCNC: 171 MG/DL (ref 70–108)
GLUCOSE BLD-MCNC: 172 MG/DL (ref 70–108)
GLUCOSE BLD-MCNC: 175 MG/DL (ref 70–108)
GLUCOSE BLD-MCNC: 176 MG/DL (ref 70–108)
GLUCOSE BLD-MCNC: 177 MG/DL (ref 70–108)
GLUCOSE BLD-MCNC: 179 MG/DL (ref 70–108)
GLUCOSE BLD-MCNC: 180 MG/DL (ref 70–108)
GLUCOSE BLD-MCNC: 182 MG/DL (ref 70–108)
GLUCOSE BLD-MCNC: 182 MG/DL (ref 70–108)
GLUCOSE BLD-MCNC: 184 MG/DL (ref 70–108)
GLUCOSE BLD-MCNC: 187 MG/DL (ref 70–108)
GLUCOSE BLD-MCNC: 192 MG/DL (ref 70–108)
GLUCOSE BLD-MCNC: 193 MG/DL (ref 70–108)
GLUCOSE BLD-MCNC: 193 MG/DL (ref 70–108)
GLUCOSE BLD-MCNC: 194 MG/DL (ref 70–108)
GLUCOSE BLD-MCNC: 196 MG/DL (ref 70–108)
GLUCOSE BLD-MCNC: 197 MG/DL (ref 70–108)
GLUCOSE BLD-MCNC: 197 MG/DL (ref 70–108)
GLUCOSE BLD-MCNC: 198 MG/DL (ref 70–108)
GLUCOSE BLD-MCNC: 208 MG/DL (ref 70–108)
GLUCOSE BLD-MCNC: 210 MG/DL (ref 70–108)
GLUCOSE BLD-MCNC: 211 MG/DL (ref 70–108)
GLUCOSE BLD-MCNC: 212 MG/DL (ref 70–108)
GLUCOSE BLD-MCNC: 224 MG/DL (ref 70–108)
GLUCOSE BLD-MCNC: 227 MG/DL (ref 70–108)
GLUCOSE BLD-MCNC: 227 MG/DL (ref 70–108)
GLUCOSE BLD-MCNC: 237 MG/DL (ref 70–108)
GLUCOSE BLD-MCNC: 240 MG/DL (ref 70–108)
GLUCOSE BLD-MCNC: 243 MG/DL (ref 70–108)
GLUCOSE BLD-MCNC: 245 MG/DL (ref 70–108)
GLUCOSE BLD-MCNC: 248 MG/DL (ref 70–108)
GLUCOSE BLD-MCNC: 248 MG/DL (ref 70–108)
GLUCOSE BLD-MCNC: 249 MG/DL (ref 70–108)
GLUCOSE BLD-MCNC: 254 MG/DL (ref 70–108)
GLUCOSE BLD-MCNC: 259 MG/DL (ref 70–108)
GLUCOSE BLD-MCNC: 54 MG/DL (ref 70–108)
GLUCOSE BLD-MCNC: 59 MG/DL (ref 70–108)
GLUCOSE BLD-MCNC: 67 MG/DL (ref 70–108)
GLUCOSE BLD-MCNC: 82 MG/DL (ref 70–108)
GLUCOSE BLD-MCNC: 83 MG/DL (ref 70–108)
GLUCOSE BLD-MCNC: 85 MG/DL (ref 70–108)
GLUCOSE BLD-MCNC: 87 MG/DL (ref 70–108)
GLUCOSE BLD-MCNC: 88 MG/DL (ref 70–108)
GLUCOSE BLD-MCNC: 89 MG/DL (ref 70–108)
GLUCOSE BLD-MCNC: 91 MG/DL (ref 70–108)
GLUCOSE BLD-MCNC: 92 MG/DL (ref 70–108)
GLUCOSE BLD-MCNC: 95 MG/DL (ref 70–108)
GLUCOSE BLD-MCNC: 99 MG/DL (ref 70–108)
GLUCOSE URINE: NEGATIVE MG/DL
GLUCOSE, FLUID: 167 MG/DL
GLUCOSE, FLUID: 182 MG/DL
GLUCOSE, URINE: NEGATIVE MG/DL
GLUCOSE, URINE: NEGATIVE MG/DL
GRAM STAIN RESULT: NORMAL
GRAM STAIN RESULT: NORMAL
HAEMOPHILUS INFLUENZA FILM ARRAY: NOT DETECTED
HAV IGM SER IA-ACNC: NEGATIVE
HBA1C MFR BLD: 6.5 % (ref 4.4–6.4)
HCO3, MIXED: 39 MMOL/L (ref 23–28)
HCO3: 39 MMOL/L (ref 23–28)
HCO3: 41 MMOL/L (ref 23–28)
HCO3: 47 MMOL/L (ref 23–28)
HCT VFR BLD CALC: 27.1 % (ref 42–52)
HCT VFR BLD CALC: 27.2 % (ref 42–52)
HCT VFR BLD CALC: 28.1 % (ref 42–52)
HCT VFR BLD CALC: 28.9 % (ref 42–52)
HCT VFR BLD CALC: 29.5 % (ref 42–52)
HCT VFR BLD CALC: 29.6 % (ref 42–52)
HCT VFR BLD CALC: 29.9 % (ref 42–52)
HCT VFR BLD CALC: 30 % (ref 42–52)
HCT VFR BLD CALC: 30.1 % (ref 42–52)
HCT VFR BLD CALC: 30.1 % (ref 42–52)
HCT VFR BLD CALC: 30.8 % (ref 42–52)
HCT VFR BLD CALC: 31.3 % (ref 42–52)
HCT VFR BLD CALC: 31.9 % (ref 42–52)
HCT VFR BLD CALC: 32.6 % (ref 42–52)
HCT VFR BLD CALC: 32.7 % (ref 42–52)
HCT VFR BLD CALC: 32.7 % (ref 42–52)
HCT VFR BLD CALC: 33.6 % (ref 42–52)
HCT VFR BLD CALC: 34.3 % (ref 42–52)
HCT VFR BLD CALC: 36.6 % (ref 42–52)
HCT VFR BLD CALC: 37.1 % (ref 42–52)
HCT VFR BLD CALC: 39.5 % (ref 42–52)
HEMOGLOBIN: 10 GM/DL (ref 14–18)
HEMOGLOBIN: 10.1 GM/DL (ref 14–18)
HEMOGLOBIN: 10.2 GM/DL (ref 14–18)
HEMOGLOBIN: 10.2 GM/DL (ref 14–18)
HEMOGLOBIN: 10.3 GM/DL (ref 14–18)
HEMOGLOBIN: 10.7 GM/DL (ref 14–18)
HEMOGLOBIN: 10.8 GM/DL (ref 14–18)
HEMOGLOBIN: 11 GM/DL (ref 14–18)
HEMOGLOBIN: 11.1 GM/DL (ref 14–18)
HEMOGLOBIN: 11.3 GM/DL (ref 14–18)
HEMOGLOBIN: 11.5 GM/DL (ref 14–18)
HEMOGLOBIN: 11.6 GM/DL (ref 14–18)
HEMOGLOBIN: 12.2 GM/DL (ref 14–18)
HEMOGLOBIN: 8.9 GM/DL (ref 14–18)
HEMOGLOBIN: 9.1 GM/DL (ref 14–18)
HEMOGLOBIN: 9.4 GM/DL (ref 14–18)
HEMOGLOBIN: 9.7 GM/DL (ref 14–18)
HEMOGLOBIN: 9.7 GM/DL (ref 14–18)
HEMOGLOBIN: 9.8 GM/DL (ref 14–18)
HEMOGLOBIN: 9.8 GM/DL (ref 14–18)
HEMOGLOBIN: 9.9 GM/DL (ref 14–18)
HEPATITIS B CORE IGM ANTIBODY: NEGATIVE
HEPATITIS B SURFACE ANTIGEN: NEGATIVE
HEPATITIS C ANTIBODY: NEGATIVE
ICTOTEST: NEGATIVE
ICTOTEST: NEGATIVE
IFIO2: 3
IFIO2: 4
IMMATURE GRANS (ABS): 0.06 THOU/MM3 (ref 0–0.07)
IMMATURE GRANS (ABS): 0.09 THOU/MM3 (ref 0–0.07)
IMMATURE GRANULOCYTES: 0.4 %
IMMATURE GRANULOCYTES: 0.5 %
INR BLD: 1.01 (ref 0.85–1.13)
INR BLD: 1.05 (ref 0.85–1.13)
INR BLD: 1.15 (ref 0.85–1.13)
INR BLD: 1.17 (ref 0.85–1.13)
KETONES, URINE: ABNORMAL
KETONES, URINE: NEGATIVE
KLEBSIELLA OXYTOCA FILM ARRAY: NOT DETECTED
KLEBSIELLA PNEUMONIAE FILM ARRAY: NOT DETECTED
LACTATE: 0.7 MMOL/L (ref 0.9–1.7)
LACTIC ACID: 0.7 MMOL/L (ref 0.5–2.2)
LACTIC ACID: 1 MMOL/L (ref 0.5–2.2)
LACTIC ACID: 1.6 MMOL/L (ref 0.5–2.2)
LACTIC ACID: 2 MMOL/L (ref 0.5–2.2)
LD, FLUID: 46 U/L
LD, FLUID: 68 U/L
LD: 220 U/L (ref 100–190)
LEUKOCYTE EST, POC: ABNORMAL
LEUKOCYTE ESTERASE, URINE: ABNORMAL
LEUKOCYTE ESTERASE, URINE: NEGATIVE
LIPASE: 40.5 U/L (ref 5.6–51.3)
LISTERIA MONOCYTOGENES FILM ARRAY: NOT DETECTED
LYMPHOCYTES # BLD: 33.8 %
LYMPHOCYTES # BLD: 36.6 %
LYMPHOCYTES # BLD: 40.7 %
LYMPHOCYTES # BLD: 42.7 %
LYMPHOCYTES # BLD: 45.5 %
LYMPHOCYTES # BLD: 46.7 %
LYMPHOCYTES # BLD: 47 % (ref 15–47)
LYMPHOCYTES # BLD: 49.2 %
LYMPHOCYTES # BLD: 50.2 %
LYMPHOCYTES # BLD: 52.5 %
LYMPHOCYTES # BLD: 52.8 %
LYMPHOCYTES # BLD: 54.1 %
LYMPHOCYTES # BLD: 55.9 %
LYMPHOCYTES # BLD: 57.2 %
LYMPHOCYTES # BLD: 57.3 %
LYMPHOCYTES # BLD: 58 %
LYMPHOCYTES # BLD: 59.1 %
LYMPHOCYTES ABSOLUTE: 3 THOU/MM3 (ref 1–4.8)
LYMPHOCYTES ABSOLUTE: 3.4 THOU/MM3 (ref 1–4.8)
LYMPHOCYTES ABSOLUTE: 3.8 THOU/MM3 (ref 1–4.8)
LYMPHOCYTES ABSOLUTE: 3.9 THOU/MM3 (ref 1–4.8)
LYMPHOCYTES ABSOLUTE: 4.3 THOU/MM3 (ref 1–4.8)
LYMPHOCYTES ABSOLUTE: 4.4 THOU/MM3 (ref 1–4.8)
LYMPHOCYTES ABSOLUTE: 4.5 THOU/MM3 (ref 1–4.8)
LYMPHOCYTES ABSOLUTE: 5 THOU/MM3 (ref 1–4.8)
LYMPHOCYTES ABSOLUTE: 5.2 THOU/MM3 (ref 1–4.8)
LYMPHOCYTES ABSOLUTE: 5.2 THOU/MM3 (ref 1–4.8)
LYMPHOCYTES ABSOLUTE: 6 THOU/MM3 (ref 1–4.8)
LYMPHOCYTES ABSOLUTE: 6.1 THOU/MM3 (ref 1–4.8)
LYMPHOCYTES ABSOLUTE: 6.5 THOU/MM3 (ref 1–4.8)
LYMPHOCYTES ABSOLUTE: 6.6 THOU/MM3 (ref 1–4.8)
LYMPHOCYTES ABSOLUTE: 7.2 THOU/MM3 (ref 1–4.8)
LYMPHOCYTES ABSOLUTE: 9.2 THOU/MM3 (ref 1–4.8)
LYMPHOCYTES, BODY FLUID: 43 % (ref 25–100)
LYMPHOCYTES, BODY FLUID: 49 % (ref 25–100)
MAGNESIUM: 2.1 MG/DL (ref 1.6–2.4)
MAGNESIUM: 2.1 MG/DL (ref 1.8–2.4)
MAGNESIUM: 2.2 MG/DL (ref 1.6–2.4)
MAGNESIUM: 2.5 MG/DL (ref 1.6–2.4)
MCH RBC QN AUTO: 29.8 PG (ref 26–33)
MCH RBC QN AUTO: 30 PG (ref 26–33)
MCH RBC QN AUTO: 30 PG (ref 26–33)
MCH RBC QN AUTO: 30.1 PG (ref 26–33)
MCH RBC QN AUTO: 30.2 PG (ref 27–31)
MCH RBC QN AUTO: 30.3 PG (ref 27–31)
MCH RBC QN AUTO: 30.6 PG (ref 27–31)
MCH RBC QN AUTO: 30.7 PG (ref 27–31)
MCH RBC QN AUTO: 31.3 PG (ref 27–31)
MCH RBC QN AUTO: 31.4 PG (ref 27–31)
MCH RBC QN AUTO: 31.7 PG (ref 27–31)
MCH RBC QN AUTO: 31.8 PG (ref 27–31)
MCH RBC QN AUTO: 32 PG (ref 27–31)
MCH RBC QN AUTO: 32.2 PG (ref 27–31)
MCH RBC QN AUTO: 32.2 PG (ref 27–31)
MCH RBC QN AUTO: 32.3 PG (ref 27–31)
MCH RBC QN AUTO: 32.5 PG (ref 27–31)
MCH RBC QN AUTO: 32.7 PG (ref 27–31)
MCHC RBC AUTO-ENTMCNC: 30.1 GM/DL (ref 32.2–35.5)
MCHC RBC AUTO-ENTMCNC: 30.9 GM/DL (ref 32.2–35.5)
MCHC RBC AUTO-ENTMCNC: 30.9 GM/DL (ref 32.2–35.5)
MCHC RBC AUTO-ENTMCNC: 31.3 GM/DL (ref 32.2–35.5)
MCHC RBC AUTO-ENTMCNC: 32.5 GM/DL (ref 33–37)
MCHC RBC AUTO-ENTMCNC: 32.5 GM/DL (ref 33–37)
MCHC RBC AUTO-ENTMCNC: 32.6 GM/DL (ref 33–37)
MCHC RBC AUTO-ENTMCNC: 32.8 GM/DL (ref 33–37)
MCHC RBC AUTO-ENTMCNC: 32.9 GM/DL (ref 33–37)
MCHC RBC AUTO-ENTMCNC: 32.9 GM/DL (ref 33–37)
MCHC RBC AUTO-ENTMCNC: 33 GM/DL (ref 33–37)
MCHC RBC AUTO-ENTMCNC: 33.1 GM/DL (ref 33–37)
MCHC RBC AUTO-ENTMCNC: 33.2 GM/DL (ref 33–37)
MCHC RBC AUTO-ENTMCNC: 33.3 GM/DL (ref 33–37)
MCHC RBC AUTO-ENTMCNC: 33.6 GM/DL (ref 33–37)
MCHC RBC AUTO-ENTMCNC: 33.6 GM/DL (ref 33–37)
MCHC RBC AUTO-ENTMCNC: 33.8 GM/DL (ref 33–37)
MCHC RBC AUTO-ENTMCNC: 33.8 GM/DL (ref 33–37)
MCHC RBC AUTO-ENTMCNC: 33.9 GM/DL (ref 33–37)
MCHC RBC AUTO-ENTMCNC: 33.9 GM/DL (ref 33–37)
MCV RBC AUTO: 91.6 FL (ref 80–94)
MCV RBC AUTO: 92.3 FL (ref 80–94)
MCV RBC AUTO: 92.4 FL (ref 80–94)
MCV RBC AUTO: 92.4 FL (ref 80–94)
MCV RBC AUTO: 94.1 FL (ref 80–94)
MCV RBC AUTO: 95 FL (ref 80–94)
MCV RBC AUTO: 95.2 FL (ref 80–94)
MCV RBC AUTO: 95.3 FL (ref 80–94)
MCV RBC AUTO: 95.3 FL (ref 80–94)
MCV RBC AUTO: 95.9 FL (ref 80–94)
MCV RBC AUTO: 96.3 FL (ref 80–94)
MCV RBC AUTO: 96.4 FL (ref 80–94)
MCV RBC AUTO: 96.4 FL (ref 80–94)
MCV RBC AUTO: 96.6 FL (ref 80–94)
MCV RBC AUTO: 96.7 FL (ref 80–94)
MCV RBC AUTO: 96.8 FL (ref 80–94)
MCV RBC AUTO: 97.1 FL (ref 80–94)
MCV RBC AUTO: 97.4 FL (ref 80–94)
MCV RBC AUTO: 97.5 FL (ref 80–94)
MCV RBC AUTO: 97.6 FL (ref 80–94)
MCV RBC AUTO: 97.7 FL (ref 80–94)
MCV RBC AUTO: 97.8 FL (ref 80–94)
MCV RBC AUTO: 99.7 FL (ref 80–94)
MESOTHELIAL CELLS BODY FLUID: NORMAL
METHICILLIN RESISTANT FILM ARRAY: NORMAL
MISCELLANEOUS 2: ABNORMAL
MISCELLANEOUS 2: ABNORMAL
MONOCYTE, FLUID: 44 % (ref 25–100)
MONOCYTE, FLUID: 54 % (ref 25–100)
MONOCYTES # BLD: 1.8 %
MONOCYTES # BLD: 3.3 %
MONOCYTES # BLD: 3.5 %
MONOCYTES # BLD: 3.7 %
MONOCYTES # BLD: 3.9 %
MONOCYTES # BLD: 4.1 %
MONOCYTES # BLD: 4.3 %
MONOCYTES # BLD: 4.4 %
MONOCYTES # BLD: 4.4 %
MONOCYTES # BLD: 5.2 %
MONOCYTES # BLD: 6.2 %
MONOCYTES ABSOLUTE: 0.2 THOU/MM3 (ref 0.4–1.3)
MONOCYTES ABSOLUTE: 0.3 THOU/MM3 (ref 0.4–1.3)
MONOCYTES ABSOLUTE: 0.4 THOU/MM3 (ref 0.4–1.3)
MONOCYTES ABSOLUTE: 0.5 THOU/MM3 (ref 0.4–1.3)
MONOCYTES ABSOLUTE: 0.5 THOU/MM3 (ref 0.4–1.3)
MONOCYTES ABSOLUTE: 0.6 THOU/MM3 (ref 0.4–1.3)
MONOCYTES: 4.5 % (ref 0–12)
MONONUCLEAR CELLS BODY FLUID: 90 %
MRSA SCREEN RT-PCR: NEGATIVE
MRSA SCREEN: NORMAL
MUCUS: ABNORMAL
MYOGLOBIN URINE: < 1 MG/L (ref 0–1)
MYOGLOBIN URINE: < 1 MG/L (ref 0–1)
MYOGLOBIN URINE: POSITIVE
MYOGLOBIN URINE: POSITIVE
NEISSERIA MENIGITIDIS FILM ARRAY: NOT DETECTED
NITRITE, URINE: NEGATIVE
NUCLEATED RED BLOOD CELLS: 0 /100 WBC
NUCLEATED RED BLOOD CELLS: 1 /100 WBC
O2 SAT, MIXED: 96 %
O2 SATURATION: 90 %
O2 SATURATION: 92 %
O2 SATURATION: 96 %
OPIATES, URINE: NEGATIVE
ORGANISM: ABNORMAL
OSMOLALITY CALCULATION: 298.6 MOSMOL/KG (ref 275–300)
OSMOLALITY CALCULATION: 299.6 MOSMOL/KG (ref 275–300)
OSMOLALITY CALCULATION: 300.6 MOSMOL/KG (ref 275–300)
OSMOLALITY CALCULATION: 304.8 MOSMOL/KG (ref 275–300)
OSMOLALITY URINE: 338 MOSMOL/KG (ref 250–750)
OSMOLALITY URINE: 385 MOSMOL/KG (ref 250–750)
OSMOLALITY: 318 MOSMOL/KG (ref 275–295)
OSMOLALITY: 330 MOSMOL/KG (ref 275–295)
OVALOCYTES: ABNORMAL
OVALOCYTES: ABNORMAL
OXYCODONE: NEGATIVE
PATHOLOGIST REVIEW: ABNORMAL
PATHOLOGIST REVIEW: NORMAL
PCO2, MIXED VENOUS: 72 MMHG (ref 41–51)
PCO2: 66 MMHG (ref 35–45)
PCO2: 70 MMHG (ref 35–45)
PCO2: 84 MMHG (ref 35–45)
PDW BLD-RTO: 13.4 % (ref 11.5–14.5)
PDW BLD-RTO: 14.9 % (ref 11.5–14.5)
PDW BLD-RTO: 15 % (ref 11.5–14.5)
PDW BLD-RTO: 15.3 % (ref 11.5–14.5)
PDW BLD-RTO: 15.6 % (ref 11.5–14.5)
PDW BLD-RTO: 15.7 % (ref 11.5–14.5)
PDW BLD-RTO: 15.7 % (ref 11.5–14.5)
PDW BLD-RTO: 15.8 % (ref 11.5–14.5)
PDW BLD-RTO: 15.9 % (ref 11.5–14.5)
PDW BLD-RTO: 15.9 % (ref 11.5–14.5)
PDW BLD-RTO: 16 % (ref 11.5–14.5)
PDW BLD-RTO: 16.1 % (ref 11.5–14.5)
PDW BLD-RTO: 16.2 % (ref 11.5–14.5)
PDW BLD-RTO: 16.2 % (ref 11.5–14.5)
PDW BLD-RTO: 16.3 % (ref 11.5–14.5)
PDW BLD-RTO: 17.3 % (ref 11.5–14.5)
PDW BLD-RTO: 17.4 % (ref 11.5–14.5)
PH BLOOD GAS: 7.28 (ref 7.35–7.45)
PH BLOOD GAS: 7.4 (ref 7.35–7.45)
PH BLOOD GAS: 7.44 (ref 7.35–7.45)
PH FLUID: 7.4
PH FLUID: 7.47
PH UA: 5
PH UA: 5
PH UA: 5.5
PH UA: 5.5 (ref 5–9)
PH UA: 6
PH, MIXED: 7.35 (ref 7.31–7.41)
PHENCYCLIDINE QUANTITATIVE URINE: NEGATIVE
PHOSPHORUS: 2.1 MG/DL (ref 2.4–4.7)
PHOSPHORUS: 2.5 MG/DL (ref 2.4–4.7)
PHOSPHORUS: 2.7 MG/DL (ref 2.4–4.7)
PHOSPHORUS: 2.9 MG/DL (ref 2.4–4.7)
PHOSPHORUS: 3 MG/DL (ref 2.4–4.7)
PHOSPHORUS: 3 MG/DL (ref 2.4–4.7)
PHOSPHORUS: 3.2 MG/DL (ref 2.4–4.7)
PHOSPHORUS: 3.7 MG/DL (ref 2.4–4.7)
PHOSPHORUS: 4.3 MG/DL (ref 2.4–4.7)
PLATELET # BLD: 100 THOU/MM3 (ref 130–400)
PLATELET # BLD: 106 THOU/MM3 (ref 130–400)
PLATELET # BLD: 125 THOU/MM3 (ref 130–400)
PLATELET # BLD: 127 THOU/MM3 (ref 130–400)
PLATELET # BLD: 131 THOU/MM3 (ref 130–400)
PLATELET # BLD: 131 THOU/MM3 (ref 130–400)
PLATELET # BLD: 134 THOU/MM3 (ref 130–400)
PLATELET # BLD: 136 THOU/MM3 (ref 130–400)
PLATELET # BLD: 145 THOU/MM3 (ref 130–400)
PLATELET # BLD: 148 THOU/MM3 (ref 130–400)
PLATELET # BLD: 161 THOU/MM3 (ref 130–400)
PLATELET # BLD: 64 THOU/MM3 (ref 130–400)
PLATELET # BLD: 71 THOU/MM3 (ref 130–400)
PLATELET # BLD: 73 THOU/MM3 (ref 130–400)
PLATELET # BLD: 74 THOU/MM3 (ref 130–400)
PLATELET # BLD: 77 THOU/MM3 (ref 130–400)
PLATELET # BLD: 81 THOU/MM3 (ref 130–400)
PLATELET # BLD: 82 THOU/MM3 (ref 130–400)
PLATELET # BLD: 82 THOU/MM3 (ref 130–400)
PLATELET # BLD: 87 THOU/MM3 (ref 130–400)
PLATELET # BLD: 94 THOU/MM3 (ref 130–400)
PLATELET # BLD: 96 THOU/MM3 (ref 130–400)
PLATELET # BLD: 99 THOU/MM3 (ref 130–400)
PLATELET ESTIMATE: ABNORMAL
PMV BLD AUTO: 11.5 FL (ref 9.4–12.4)
PMV BLD AUTO: 11.9 FL (ref 9.4–12.4)
PMV BLD AUTO: 12 FL (ref 9.4–12.4)
PMV BLD AUTO: 12.2 FL (ref 9.4–12.4)
PMV BLD AUTO: 7.1 FL (ref 7.4–10.4)
PMV BLD AUTO: 7.2 FL (ref 7.4–10.4)
PMV BLD AUTO: 7.2 FL (ref 7.4–10.4)
PMV BLD AUTO: 7.3 FL (ref 7.4–10.4)
PMV BLD AUTO: 7.5 FL (ref 7.4–10.4)
PMV BLD AUTO: 7.6 FL (ref 7.4–10.4)
PMV BLD AUTO: 7.7 FL (ref 7.4–10.4)
PMV BLD AUTO: 7.7 MCM (ref 7.4–10.4)
PMV BLD AUTO: 7.8 MCM (ref 7.4–10.4)
PMV BLD AUTO: 7.8 MCM (ref 7.4–10.4)
PMV BLD AUTO: 8 FL (ref 7.4–10.4)
PMV BLD AUTO: 8 FL (ref 7.4–10.4)
PMV BLD AUTO: 8.1 FL (ref 7.4–10.4)
PMV BLD AUTO: 8.1 MCM (ref 7.4–10.4)
PMV BLD AUTO: 8.2 FL (ref 7.4–10.4)
PMV BLD AUTO: 8.2 MCM (ref 7.4–10.4)
PMV BLD AUTO: 8.7 MCM (ref 7.4–10.4)
PMV BLD AUTO: 8.8 MCM (ref 7.4–10.4)
PMV BLD AUTO: 9 MCM (ref 7.4–10.4)
PO2 MIXED: 93 MMHG (ref 25–40)
PO2: 67 MMHG (ref 71–104)
PO2: 69 MMHG (ref 71–104)
PO2: 89 MMHG (ref 71–104)
POC CALCIUM: 9.3 MG/DL (ref 8.5–10.1)
POIKILOCYTES: ABNORMAL
POIKILOCYTES: SLIGHT
POLYMORPHONUCLEAR CELLS BODY FLUID: 10 %
POTASSIUM REFLEX MAGNESIUM: 4.1 MEQ/L (ref 3.5–5.2)
POTASSIUM REFLEX MAGNESIUM: 4.7 MEQ/L (ref 3.5–5.2)
POTASSIUM REFLEX MAGNESIUM: 4.8 MEQ/L (ref 3.5–5.2)
POTASSIUM SERPL-SCNC: 3.5 MEQ/L (ref 3.5–5.2)
POTASSIUM SERPL-SCNC: 3.7 MEQ/L (ref 3.5–5.2)
POTASSIUM SERPL-SCNC: 3.7 MEQ/L (ref 3.5–5.2)
POTASSIUM SERPL-SCNC: 3.8 MEQ/L (ref 3.5–5.2)
POTASSIUM SERPL-SCNC: 3.9 MEQ/L (ref 3.5–5.2)
POTASSIUM SERPL-SCNC: 3.9 MEQ/L (ref 3.5–5.2)
POTASSIUM SERPL-SCNC: 4.1 MEQ/L (ref 3.5–5.2)
POTASSIUM SERPL-SCNC: 4.2 MEQ/L (ref 3.5–5.2)
POTASSIUM SERPL-SCNC: 4.2 MEQ/L (ref 3.5–5.2)
POTASSIUM SERPL-SCNC: 4.4 MEQ/L (ref 3.5–5.2)
POTASSIUM SERPL-SCNC: 4.5 MEQ/L (ref 3.5–5.2)
POTASSIUM SERPL-SCNC: 4.6 MEQ/L (ref 3.5–5.2)
POTASSIUM SERPL-SCNC: 4.7 MEQ/L (ref 3.5–5.2)
POTASSIUM SERPL-SCNC: 4.8 MEQ/L (ref 3.5–5.2)
POTASSIUM SERPL-SCNC: 4.9 MEQ/L (ref 3.5–5.1)
POTASSIUM SERPL-SCNC: 4.9 MEQ/L (ref 3.5–5.2)
POTASSIUM SERPL-SCNC: 4.9 MEQ/L (ref 3.5–5.2)
POTASSIUM SERPL-SCNC: 5 MEQ/L (ref 3.5–5.2)
POTASSIUM, URINE: 24 MEQ/L
POTASSIUM, URINE: 54.5 MEQ/L
PRO-BNP: 1513 PG/ML (ref 0–1800)
PRO-BNP: 2188 PG/ML (ref 0–1800)
PRO-BNP: 3377 PG/ML (ref 0–1800)
PRO-BNP: 5620 PG/ML (ref 0–1800)
PRO-BNP: 752 PG/ML (ref 0–1800)
PRO-BNP: 876.8 PG/ML (ref 0–1800)
PRO-BNP: 915.9 PG/ML (ref 0–1800)
PROCALCITONIN: 0.07 NG/ML (ref 0.01–0.09)
PROCALCITONIN: 0.13 NG/ML (ref 0.01–0.09)
PROCALCITONIN: 0.8 NG/ML (ref 0.01–0.09)
PROTEIN FLUID: 0.9 GM/DL
PROTEIN FLUID: 1.2 GM/DL
PROTEIN FLUID: 2 GM/DL
PROTEIN UA: 100
PROTEIN UA: 100 MG/DL
PROTEIN UA: 100 MG/DL
PROTEIN UA: ABNORMAL
PROTEIN UA: NEGATIVE
PROTEUS FILM ARRAY: NOT DETECTED
PSEUDOMONAS AERUGINOSA FILM ARRAY: NOT DETECTED
RBC # BLD: 2.93 MILL/MM3 (ref 4.7–6.1)
RBC # BLD: 2.95 MILL/MM3 (ref 4.7–6.1)
RBC # BLD: 2.95 MILL/MM3 (ref 4.7–6.1)
RBC # BLD: 2.99 MILL/MM3 (ref 4.7–6.1)
RBC # BLD: 3.07 MILL/MM3 (ref 4.7–6.1)
RBC # BLD: 3.09 MILL/MM3 (ref 4.7–6.1)
RBC # BLD: 3.1 MILL/MM3 (ref 4.7–6.1)
RBC # BLD: 3.14 MILL/MM3 (ref 4.7–6.1)
RBC # BLD: 3.16 MILL/MM3 (ref 4.7–6.1)
RBC # BLD: 3.35 MILL/MM3 (ref 4.7–6.1)
RBC # BLD: 3.37 MILL/MM3 (ref 4.7–6.1)
RBC # BLD: 3.39 MILL/MM3 (ref 4.7–6.1)
RBC # BLD: 3.45 MILL/MM3 (ref 4.7–6.1)
RBC # BLD: 3.49 MILL/MM3 (ref 4.7–6.1)
RBC # BLD: 3.54 MILL/MM3 (ref 4.7–6.1)
RBC # BLD: 3.56 MILL/MM3 (ref 4.7–6.1)
RBC # BLD: 3.79 MILL/MM3 (ref 4.7–6.1)
RBC # BLD: 3.85 MILL/MM3 (ref 4.7–6.1)
RBC # BLD: 4.07 MILL/MM3 (ref 4.7–6.1)
RBC FLUID: 2360 /CUMM (ref 0–100)
RBC FLUID: 73 /CUMM (ref 0–100)
RBC UA: ABNORMAL /HPF
RBC URINE: ABNORMAL /HPF
REFLEX TO URINE C & S: ABNORMAL
RENAL EPITHELIAL, UA: ABNORMAL
SCAN OF BLOOD SMEAR: NORMAL
SCHISTOCYTES: ABNORMAL
SCHISTOCYTES: ABNORMAL
SEDIMENTATION RATE, ERYTHROCYTE: 6 MM/HR (ref 0–10)
SEG NEUTROPHILS: 36.2 %
SEG NEUTROPHILS: 37.1 %
SEG NEUTROPHILS: 37.6 %
SEG NEUTROPHILS: 38.7 %
SEG NEUTROPHILS: 39.9 %
SEG NEUTROPHILS: 40.7 %
SEG NEUTROPHILS: 41.4 %
SEG NEUTROPHILS: 41.6 %
SEG NEUTROPHILS: 42.3 %
SEG NEUTROPHILS: 44.9 %
SEG NEUTROPHILS: 45.4 %
SEG NEUTROPHILS: 46.1 %
SEG NEUTROPHILS: 49.1 %
SEG NEUTROPHILS: 52.6 %
SEG NEUTROPHILS: 55.9 %
SEG NEUTROPHILS: 58.1 %
SEGMENTED NEUTROPHILS ABSOLUTE COUNT: 3.4 THOU/MM3 (ref 1.8–7.7)
SEGMENTED NEUTROPHILS ABSOLUTE COUNT: 3.5 THOU/MM3 (ref 1.8–7.7)
SEGMENTED NEUTROPHILS ABSOLUTE COUNT: 3.6 THOU/MM3 (ref 1.8–7.7)
SEGMENTED NEUTROPHILS ABSOLUTE COUNT: 3.7 THOU/MM3 (ref 1.8–7.7)
SEGMENTED NEUTROPHILS ABSOLUTE COUNT: 3.8 THOU/MM3 (ref 1.8–7.7)
SEGMENTED NEUTROPHILS ABSOLUTE COUNT: 3.9 THOU/MM3 (ref 1.8–7.7)
SEGMENTED NEUTROPHILS ABSOLUTE COUNT: 4.1 THOU/MM3 (ref 1.8–7.7)
SEGMENTED NEUTROPHILS ABSOLUTE COUNT: 4.2 THOU/MM3 (ref 1.8–7.7)
SEGMENTED NEUTROPHILS ABSOLUTE COUNT: 4.3 THOU/MM3 (ref 1.8–7.7)
SEGMENTED NEUTROPHILS ABSOLUTE COUNT: 4.8 THOU/MM3 (ref 1.8–7.7)
SEGMENTED NEUTROPHILS ABSOLUTE COUNT: 5.3 THOU/MM3 (ref 1.8–7.7)
SEGMENTED NEUTROPHILS ABSOLUTE COUNT: 6.7 THOU/MM3 (ref 1.8–7.7)
SEGMENTED NEUTROPHILS ABSOLUTE COUNT: 8 THOU/MM3 (ref 1.8–7.7)
SEGMENTED NEUTROPHILS ABSOLUTE COUNT: 8.7 THOU/MM3 (ref 1.8–7.7)
SEGMENTED NEUTROPHILS, BODY FLUID: 3 % (ref 0–25)
SEGMENTED NEUTROPHILS, BODY FLUID: 7 % (ref 0–25)
SEGS: 45.9 % (ref 43–75)
SERRATIA MARCESCENS FILM ARRAY: NOT DETECTED
SITE: ABNORMAL
SMUDGE CELLS: PRESENT
SODIUM BLD-SCNC: 138 MEQ/L (ref 135–145)
SODIUM BLD-SCNC: 139 MEQ/L (ref 135–145)
SODIUM BLD-SCNC: 139 MEQ/L (ref 135–145)
SODIUM BLD-SCNC: 140 MEQ/L (ref 135–145)
SODIUM BLD-SCNC: 141 MEQ/L (ref 135–145)
SODIUM BLD-SCNC: 142 MEQ/L (ref 135–145)
SODIUM BLD-SCNC: 143 MEQ/L (ref 135–145)
SODIUM BLD-SCNC: 144 MEQ/L (ref 135–145)
SODIUM BLD-SCNC: 144 MEQ/L (ref 135–145)
SODIUM BLD-SCNC: 144 MEQ/L (ref 136–145)
SODIUM BLD-SCNC: 145 MEQ/L (ref 135–145)
SODIUM BLD-SCNC: 146 MEQ/L (ref 135–145)
SODIUM BLD-SCNC: 147 MEQ/L (ref 135–145)
SODIUM URINE: 72 MEQ/L
SODIUM URINE: < 20 MEQ/L
SOURCE OF BLOOD CULTURE: NORMAL
SOURCE, BLOOD GAS: ABNORMAL
SPECIFIC GRAVITY UA: 1.02 (ref 1–1.03)
SPECIFIC GRAVITY UA: >= 1.03 (ref 1–1.03)
SPECIFIC GRAVITY, URINE: 1.01 (ref 1–1.03)
SPECIFIC GRAVITY, URINE: 1.01 (ref 1–1.03)
SPECIFIC GRAVITY, URINE: 1.02 (ref 1–1.03)
SPECIMEN: ABNORMAL
SPECIMEN: NORMAL
STAPH AUREUS FILM ARRAY: NOT DETECTED
STAPHYLOCOCCUS FILM ARRAY: NOT DETECTED
STREP AGALACTIAE FILM ARRAY: NOT DETECTED
STREP PNEUMONIAE FILM ARRAY: NOT DETECTED
STREP PYOCGENES FILM ARRAY: NOT DETECTED
STREPTOCOCCUS FILM ARRAY: NOT DETECTED
TOTAL NUCLEATED CELLS BODY FLUID: 25 /CUMM (ref 0–500)
TOTAL PROTEIN: 4.8 G/DL (ref 6.1–8)
TOTAL PROTEIN: 4.8 G/DL (ref 6.1–8)
TOTAL PROTEIN: 4.9 G/DL (ref 6.1–8)
TOTAL PROTEIN: 5 G/DL (ref 6.1–8)
TOTAL PROTEIN: 5.1 G/DL (ref 6.1–8)
TOTAL PROTEIN: 5.3 G/DL (ref 6.1–8)
TOTAL PROTEIN: 5.4 G/DL (ref 6.1–8)
TOTAL PROTEIN: 6 G/DL (ref 6.1–8)
TOTAL VOLUME RECEIVED BODY FLUID: 80 ML
TOXIC GRANULATION: SLIGHT
TOXIC GRANULATION: SLIGHT
TROPONIN I: < 0.017 NG/ML (ref 0.02–0.05)
TROPONIN T: 0.02 NG/ML
TROPONIN T: 0.03 NG/ML
TROPONIN T: 0.04 NG/ML
TROPONIN T: 0.05 NG/ML
TROPONIN T: 0.05 NG/ML
TROPONIN T: 0.06 NG/ML
TROPONIN T: 0.07 NG/ML
TSH SERPL DL<=0.05 MIU/L-ACNC: 1.52 UIU/ML (ref 0.4–4.2)
URIC ACID: 12.9 MG/DL (ref 3.7–7)
URIC ACID: 16.9 MG/DL (ref 3.7–7)
URIC ACID: 7.3 MG/DL (ref 3.7–7)
URINE CULTURE REFLEX: ABNORMAL
URINE CULTURE, ROUTINE: ABNORMAL
UROBILINOGEN, URINE: 0.2 EU/DL
UROBILINOGEN, URINE: 0.2 EU/DL (ref 0–1)
UROBILINOGEN, URINE: 1 EU/DL
UROBILINOGEN, URINE: 1 EU/DL
VANCOMYCIN RESISTANT FILM ARRAY: NORMAL
VRE CULTURE: NORMAL
WBC # BLD: 10.4 THOU/MM3 (ref 4.8–10.8)
WBC # BLD: 10.5 THOU/MM3 (ref 4.8–10.8)
WBC # BLD: 10.6 THOU/MM3 (ref 4.8–10.8)
WBC # BLD: 11 THOU/MM3 (ref 4.8–10.8)
WBC # BLD: 11.2 THOU/MM3 (ref 4.8–10.8)
WBC # BLD: 11.5 THOU/MM3 (ref 4.8–10.8)
WBC # BLD: 12.5 THOU/MM3 (ref 4.8–10.8)
WBC # BLD: 15.2 THOU/MM3 (ref 4.8–10.8)
WBC # BLD: 18.8 THOU/MM3 (ref 4.8–10.8)
WBC # BLD: 7.3 THOU/MM3 (ref 4.8–10.8)
WBC # BLD: 8.4 THOU/MM3 (ref 4.8–10.8)
WBC # BLD: 8.5 THOU/MM3 (ref 4.8–10.8)
WBC # BLD: 8.5 THOU/MM3 (ref 4.8–10.8)
WBC # BLD: 8.7 THOU/MM3 (ref 4.8–10.8)
WBC # BLD: 8.8 THOU/MM3 (ref 4.8–10.8)
WBC # BLD: 8.9 THOU/MM3 (ref 4.8–10.8)
WBC # BLD: 9.4 THOU/MM3 (ref 4.8–10.8)
WBC # BLD: 9.5 THOU/MM3 (ref 4.8–10.8)
WBC # BLD: 9.5 THOU/MM3 (ref 4.8–10.8)
WBC # BLD: 9.6 THOU/MM3 (ref 4.8–10.8)
WBC # BLD: 9.9 THOU/MM3 (ref 4.8–10.8)
WBC FLUID: 107 /MM3 (ref 0–500)
WBC FLUID: 22 /MM3 (ref 0–500)
WBC UA: > 100 /HPF
WBC UA: > 200 /HPF
WBC UA: ABNORMAL /HPF
YEAST: ABNORMAL

## 2018-01-01 PROCEDURE — 85025 COMPLETE CBC W/AUTO DIFF WBC: CPT

## 2018-01-01 PROCEDURE — 36415 COLL VENOUS BLD VENIPUNCTURE: CPT

## 2018-01-01 PROCEDURE — 97110 THERAPEUTIC EXERCISES: CPT

## 2018-01-01 PROCEDURE — 94640 AIRWAY INHALATION TREATMENT: CPT

## 2018-01-01 PROCEDURE — 6370000000 HC RX 637 (ALT 250 FOR IP): Performed by: INTERNAL MEDICINE

## 2018-01-01 PROCEDURE — 97116 GAIT TRAINING THERAPY: CPT

## 2018-01-01 PROCEDURE — 1290000000 HC SEMI PRIVATE OTHER R&B

## 2018-01-01 PROCEDURE — 97166 OT EVAL MOD COMPLEX 45 MIN: CPT

## 2018-01-01 PROCEDURE — 87040 BLOOD CULTURE FOR BACTERIA: CPT

## 2018-01-01 PROCEDURE — 80069 RENAL FUNCTION PANEL: CPT

## 2018-01-01 PROCEDURE — 2580000003 HC RX 258: Performed by: HOSPITALIST

## 2018-01-01 PROCEDURE — A6402 STERILE GAUZE <= 16 SQ IN: HCPCS

## 2018-01-01 PROCEDURE — 82948 REAGENT STRIP/BLOOD GLUCOSE: CPT

## 2018-01-01 PROCEDURE — 6370000000 HC RX 637 (ALT 250 FOR IP): Performed by: HOSPITALIST

## 2018-01-01 PROCEDURE — 97165 OT EVAL LOW COMPLEX 30 MIN: CPT

## 2018-01-01 PROCEDURE — 99223 1ST HOSP IP/OBS HIGH 75: CPT | Performed by: INTERNAL MEDICINE

## 2018-01-01 PROCEDURE — 6360000002 HC RX W HCPCS: Performed by: INTERNAL MEDICINE

## 2018-01-01 PROCEDURE — 6370000000 HC RX 637 (ALT 250 FOR IP): Performed by: NURSE PRACTITIONER

## 2018-01-01 PROCEDURE — 6370000000 HC RX 637 (ALT 250 FOR IP): Performed by: FAMILY MEDICINE

## 2018-01-01 PROCEDURE — 99221 1ST HOSP IP/OBS SF/LOW 40: CPT | Performed by: INTERNAL MEDICINE

## 2018-01-01 PROCEDURE — 0W9G3ZX DRAINAGE OF PERITONEAL CAVITY, PERCUTANEOUS APPROACH, DIAGNOSTIC: ICD-10-PCS | Performed by: RADIOLOGY

## 2018-01-01 PROCEDURE — 2700000000 HC OXYGEN THERAPY PER DAY

## 2018-01-01 PROCEDURE — 83930 ASSAY OF BLOOD OSMOLALITY: CPT

## 2018-01-01 PROCEDURE — 99233 SBSQ HOSP IP/OBS HIGH 50: CPT | Performed by: INTERNAL MEDICINE

## 2018-01-01 PROCEDURE — 76770 US EXAM ABDO BACK WALL COMP: CPT

## 2018-01-01 PROCEDURE — G8417 CALC BMI ABV UP PARAM F/U: HCPCS | Performed by: FAMILY MEDICINE

## 2018-01-01 PROCEDURE — 6360000002 HC RX W HCPCS: Performed by: FAMILY MEDICINE

## 2018-01-01 PROCEDURE — 1200000003 HC TELEMETRY R&B

## 2018-01-01 PROCEDURE — 36600 WITHDRAWAL OF ARTERIAL BLOOD: CPT

## 2018-01-01 PROCEDURE — 80053 COMPREHEN METABOLIC PANEL: CPT

## 2018-01-01 PROCEDURE — 0220000000 HC SKILLED NURSING FACILITY

## 2018-01-01 PROCEDURE — 51701 INSERT BLADDER CATHETER: CPT

## 2018-01-01 PROCEDURE — 87077 CULTURE AEROBIC IDENTIFY: CPT

## 2018-01-01 PROCEDURE — 99232 SBSQ HOSP IP/OBS MODERATE 35: CPT | Performed by: INTERNAL MEDICINE

## 2018-01-01 PROCEDURE — 93880 EXTRACRANIAL BILAT STUDY: CPT

## 2018-01-01 PROCEDURE — 83735 ASSAY OF MAGNESIUM: CPT

## 2018-01-01 PROCEDURE — 51702 INSERT TEMP BLADDER CATH: CPT

## 2018-01-01 PROCEDURE — 81001 URINALYSIS AUTO W/SCOPE: CPT

## 2018-01-01 PROCEDURE — 99285 EMERGENCY DEPT VISIT HI MDM: CPT

## 2018-01-01 PROCEDURE — 87641 MR-STAPH DNA AMP PROBE: CPT

## 2018-01-01 PROCEDURE — 2580000003 HC RX 258: Performed by: INTERNAL MEDICINE

## 2018-01-01 PROCEDURE — 88305 TISSUE EXAM BY PATHOLOGIST: CPT

## 2018-01-01 PROCEDURE — 94761 N-INVAS EAR/PLS OXIMETRY MLT: CPT

## 2018-01-01 PROCEDURE — 99239 HOSP IP/OBS DSCHRG MGMT >30: CPT | Performed by: INTERNAL MEDICINE

## 2018-01-01 PROCEDURE — 6360000002 HC RX W HCPCS: Performed by: HOSPITALIST

## 2018-01-01 PROCEDURE — 2500000003 HC RX 250 WO HCPCS: Performed by: INTERNAL MEDICINE

## 2018-01-01 PROCEDURE — 82945 GLUCOSE OTHER FLUID: CPT

## 2018-01-01 PROCEDURE — 87116 MYCOBACTERIA CULTURE: CPT

## 2018-01-01 PROCEDURE — 84550 ASSAY OF BLOOD/URIC ACID: CPT

## 2018-01-01 PROCEDURE — 94660 CPAP INITIATION&MGMT: CPT

## 2018-01-01 PROCEDURE — 71045 X-RAY EXAM CHEST 1 VIEW: CPT

## 2018-01-01 PROCEDURE — 51700 IRRIGATION OF BLADDER: CPT | Performed by: NURSE PRACTITIONER

## 2018-01-01 PROCEDURE — 89190 NASAL SMEAR FOR EOSINOPHILS: CPT

## 2018-01-01 PROCEDURE — 83605 ASSAY OF LACTIC ACID: CPT

## 2018-01-01 PROCEDURE — 99231 SBSQ HOSP IP/OBS SF/LOW 25: CPT | Performed by: INTERNAL MEDICINE

## 2018-01-01 PROCEDURE — 84133 ASSAY OF URINE POTASSIUM: CPT

## 2018-01-01 PROCEDURE — 97530 THERAPEUTIC ACTIVITIES: CPT

## 2018-01-01 PROCEDURE — G8987 SELF CARE CURRENT STATUS: HCPCS

## 2018-01-01 PROCEDURE — 87086 URINE CULTURE/COLONY COUNT: CPT

## 2018-01-01 PROCEDURE — 82570 ASSAY OF URINE CREATININE: CPT

## 2018-01-01 PROCEDURE — 1200000000 HC SEMI PRIVATE

## 2018-01-01 PROCEDURE — 87075 CULTR BACTERIA EXCEPT BLOOD: CPT

## 2018-01-01 PROCEDURE — 6360000002 HC RX W HCPCS: Performed by: NURSE PRACTITIONER

## 2018-01-01 PROCEDURE — 6360000004 HC RX CONTRAST MEDICATION: Performed by: FAMILY MEDICINE

## 2018-01-01 PROCEDURE — 80074 ACUTE HEPATITIS PANEL: CPT

## 2018-01-01 PROCEDURE — 83880 ASSAY OF NATRIURETIC PEPTIDE: CPT

## 2018-01-01 PROCEDURE — 87070 CULTURE OTHR SPECIMN AEROBIC: CPT

## 2018-01-01 PROCEDURE — 71275 CT ANGIOGRAPHY CHEST: CPT

## 2018-01-01 PROCEDURE — 93005 ELECTROCARDIOGRAM TRACING: CPT | Performed by: FAMILY MEDICINE

## 2018-01-01 PROCEDURE — 71046 X-RAY EXAM CHEST 2 VIEWS: CPT

## 2018-01-01 PROCEDURE — 87106 FUNGI IDENTIFICATION YEAST: CPT

## 2018-01-01 PROCEDURE — 83935 ASSAY OF URINE OSMOLALITY: CPT

## 2018-01-01 PROCEDURE — 80076 HEPATIC FUNCTION PANEL: CPT

## 2018-01-01 PROCEDURE — 4040F PNEUMOC VAC/ADMIN/RCVD: CPT | Performed by: FAMILY MEDICINE

## 2018-01-01 PROCEDURE — 99496 TRANSJ CARE MGMT HIGH F2F 7D: CPT | Performed by: FAMILY MEDICINE

## 2018-01-01 PROCEDURE — G8988 SELF CARE GOAL STATUS: HCPCS

## 2018-01-01 PROCEDURE — 88112 CYTOPATH CELL ENHANCE TECH: CPT

## 2018-01-01 PROCEDURE — 97535 SELF CARE MNGMENT TRAINING: CPT

## 2018-01-01 PROCEDURE — 82105 ALPHA-FETOPROTEIN SERUM: CPT

## 2018-01-01 PROCEDURE — G8989 SELF CARE D/C STATUS: HCPCS

## 2018-01-01 PROCEDURE — 85027 COMPLETE CBC AUTOMATED: CPT

## 2018-01-01 PROCEDURE — 51798 US URINE CAPACITY MEASURE: CPT

## 2018-01-01 PROCEDURE — 99222 1ST HOSP IP/OBS MODERATE 55: CPT | Performed by: NURSE PRACTITIONER

## 2018-01-01 PROCEDURE — 85610 PROTHROMBIN TIME: CPT

## 2018-01-01 PROCEDURE — 97163 PT EVAL HIGH COMPLEX 45 MIN: CPT

## 2018-01-01 PROCEDURE — 82436 ASSAY OF URINE CHLORIDE: CPT

## 2018-01-01 PROCEDURE — 32555 ASPIRATE PLEURA W/ IMAGING: CPT

## 2018-01-01 PROCEDURE — 96374 THER/PROPH/DIAG INJ IV PUSH: CPT

## 2018-01-01 PROCEDURE — 80048 BASIC METABOLIC PNL TOTAL CA: CPT

## 2018-01-01 PROCEDURE — 96365 THER/PROPH/DIAG IV INF INIT: CPT

## 2018-01-01 PROCEDURE — 84484 ASSAY OF TROPONIN QUANT: CPT

## 2018-01-01 PROCEDURE — 2709999900 HC NON-CHARGEABLE SUPPLY

## 2018-01-01 PROCEDURE — 0W993ZX DRAINAGE OF RIGHT PLEURAL CAVITY, PERCUTANEOUS APPROACH, DIAGNOSTIC: ICD-10-PCS | Performed by: RADIOLOGY

## 2018-01-01 PROCEDURE — 94760 N-INVAS EAR/PLS OXIMETRY 1: CPT

## 2018-01-01 PROCEDURE — 84300 ASSAY OF URINE SODIUM: CPT

## 2018-01-01 PROCEDURE — 93005 ELECTROCARDIOGRAM TRACING: CPT | Performed by: NURSE PRACTITIONER

## 2018-01-01 PROCEDURE — 83986 ASSAY PH BODY FLUID NOS: CPT

## 2018-01-01 PROCEDURE — A6210 FOAM DRG >16<=48 SQ IN W/O B: HCPCS

## 2018-01-01 PROCEDURE — 4040F PNEUMOC VAC/ADMIN/RCVD: CPT | Performed by: NURSE PRACTITIONER

## 2018-01-01 PROCEDURE — 83615 LACTATE (LD) (LDH) ENZYME: CPT

## 2018-01-01 PROCEDURE — 87205 SMEAR GRAM STAIN: CPT

## 2018-01-01 PROCEDURE — 83874 ASSAY OF MYOGLOBIN: CPT

## 2018-01-01 PROCEDURE — 89051 BODY FLUID CELL COUNT: CPT

## 2018-01-01 PROCEDURE — 82042 OTHER SOURCE ALBUMIN QUAN EA: CPT

## 2018-01-01 PROCEDURE — 74176 CT ABD & PELVIS W/O CONTRAST: CPT

## 2018-01-01 PROCEDURE — 82140 ASSAY OF AMMONIA: CPT

## 2018-01-01 PROCEDURE — G8427 DOCREV CUR MEDS BY ELIG CLIN: HCPCS | Performed by: NURSE PRACTITIONER

## 2018-01-01 PROCEDURE — B32SZZZ COMPUTERIZED TOMOGRAPHY (CT SCAN) OF RIGHT PULMONARY ARTERY: ICD-10-PCS | Performed by: RADIOLOGY

## 2018-01-01 PROCEDURE — 89050 BODY FLUID CELL COUNT: CPT

## 2018-01-01 PROCEDURE — 99233 SBSQ HOSP IP/OBS HIGH 50: CPT | Performed by: HOSPITALIST

## 2018-01-01 PROCEDURE — 97162 PT EVAL MOD COMPLEX 30 MIN: CPT

## 2018-01-01 PROCEDURE — B32S1ZZ COMPUTERIZED TOMOGRAPHY (CT SCAN) OF RIGHT PULMONARY ARTERY USING LOW OSMOLAR CONTRAST: ICD-10-PCS | Performed by: RADIOLOGY

## 2018-01-01 PROCEDURE — B3201ZZ COMPUTERIZED TOMOGRAPHY (CT SCAN) OF THORACIC AORTA USING LOW OSMOLAR CONTRAST: ICD-10-PCS | Performed by: RADIOLOGY

## 2018-01-01 PROCEDURE — 2580000003 HC RX 258: Performed by: FAMILY MEDICINE

## 2018-01-01 PROCEDURE — 99999 PR OFFICE/OUTPT VISIT,PROCEDURE ONLY: CPT | Performed by: NURSE PRACTITIONER

## 2018-01-01 PROCEDURE — 82803 BLOOD GASES ANY COMBINATION: CPT

## 2018-01-01 PROCEDURE — 87102 FUNGUS ISOLATION CULTURE: CPT

## 2018-01-01 PROCEDURE — G8926 SPIRO NO PERF OR DOC: HCPCS | Performed by: FAMILY MEDICINE

## 2018-01-01 PROCEDURE — 84145 PROCALCITONIN (PCT): CPT

## 2018-01-01 PROCEDURE — 2060000000 HC ICU INTERMEDIATE R&B

## 2018-01-01 PROCEDURE — 99222 1ST HOSP IP/OBS MODERATE 55: CPT | Performed by: HOSPITALIST

## 2018-01-01 PROCEDURE — G8427 DOCREV CUR MEDS BY ELIG CLIN: HCPCS | Performed by: FAMILY MEDICINE

## 2018-01-01 PROCEDURE — G8978 MOBILITY CURRENT STATUS: HCPCS

## 2018-01-01 PROCEDURE — 87804 INFLUENZA ASSAY W/OPTIC: CPT

## 2018-01-01 PROCEDURE — 84157 ASSAY OF PROTEIN OTHER: CPT

## 2018-01-01 PROCEDURE — G8598 ASA/ANTIPLAT THER USED: HCPCS | Performed by: FAMILY MEDICINE

## 2018-01-01 PROCEDURE — B32TZZZ COMPUTERIZED TOMOGRAPHY (CT SCAN) OF LEFT PULMONARY ARTERY: ICD-10-PCS | Performed by: RADIOLOGY

## 2018-01-01 PROCEDURE — 80307 DRUG TEST PRSMV CHEM ANLYZR: CPT

## 2018-01-01 PROCEDURE — 77386 HC NTSTY MODUL RAD TX DLVR CPLX: CPT | Performed by: RADIOLOGY

## 2018-01-01 PROCEDURE — 2140000000 HC CCU INTERMEDIATE R&B

## 2018-01-01 PROCEDURE — 83690 ASSAY OF LIPASE: CPT

## 2018-01-01 PROCEDURE — 87186 SC STD MICRODIL/AGAR DIL: CPT

## 2018-01-01 PROCEDURE — G8417 CALC BMI ABV UP PARAM F/U: HCPCS | Performed by: NURSE PRACTITIONER

## 2018-01-01 PROCEDURE — 93010 ELECTROCARDIOGRAM REPORT: CPT | Performed by: INTERNAL MEDICINE

## 2018-01-01 PROCEDURE — 99222 1ST HOSP IP/OBS MODERATE 55: CPT | Performed by: INTERNAL MEDICINE

## 2018-01-01 PROCEDURE — P9047 ALBUMIN (HUMAN), 25%, 50ML: HCPCS | Performed by: INTERNAL MEDICINE

## 2018-01-01 PROCEDURE — 99291 CRITICAL CARE FIRST HOUR: CPT | Performed by: HOSPITALIST

## 2018-01-01 PROCEDURE — 1036F TOBACCO NON-USER: CPT | Performed by: FAMILY MEDICINE

## 2018-01-01 PROCEDURE — G8979 MOBILITY GOAL STATUS: HCPCS

## 2018-01-01 PROCEDURE — 83036 HEMOGLOBIN GLYCOSYLATED A1C: CPT

## 2018-01-01 PROCEDURE — 85651 RBC SED RATE NONAUTOMATED: CPT

## 2018-01-01 PROCEDURE — 82248 BILIRUBIN DIRECT: CPT

## 2018-01-01 PROCEDURE — B32T1ZZ COMPUTERIZED TOMOGRAPHY (CT SCAN) OF LEFT PULMONARY ARTERY USING LOW OSMOLAR CONTRAST: ICD-10-PCS | Performed by: RADIOLOGY

## 2018-01-01 PROCEDURE — 87184 SC STD DISK METHOD PER PLATE: CPT

## 2018-01-01 PROCEDURE — 93010 ELECTROCARDIOGRAM REPORT: CPT | Performed by: NUCLEAR MEDICINE

## 2018-01-01 PROCEDURE — 84443 ASSAY THYROID STIM HORMONE: CPT

## 2018-01-01 PROCEDURE — 70450 CT HEAD/BRAIN W/O DYE: CPT

## 2018-01-01 PROCEDURE — 99231 SBSQ HOSP IP/OBS SF/LOW 25: CPT | Performed by: NURSE PRACTITIONER

## 2018-01-01 PROCEDURE — APPSS45 APP SPLIT SHARED TIME 31-45 MINUTES: Performed by: NURSE PRACTITIONER

## 2018-01-01 PROCEDURE — 1123F ACP DISCUSS/DSCN MKR DOCD: CPT | Performed by: FAMILY MEDICINE

## 2018-01-01 PROCEDURE — 87147 CULTURE TYPE IMMUNOLOGIC: CPT

## 2018-01-01 PROCEDURE — 77336 RADIATION PHYSICS CONSULT: CPT | Performed by: RADIOLOGY

## 2018-01-01 PROCEDURE — 3023F SPIROM DOC REV: CPT | Performed by: FAMILY MEDICINE

## 2018-01-01 PROCEDURE — 99214 OFFICE O/P EST MOD 30 MIN: CPT | Performed by: NURSE PRACTITIONER

## 2018-01-01 PROCEDURE — 90471 IMMUNIZATION ADMIN: CPT | Performed by: INTERNAL MEDICINE

## 2018-01-01 PROCEDURE — P9046 ALBUMIN (HUMAN), 25%, 20 ML: HCPCS | Performed by: INTERNAL MEDICINE

## 2018-01-01 PROCEDURE — 2580000003 HC RX 258: Performed by: NURSE PRACTITIONER

## 2018-01-01 PROCEDURE — B320ZZZ COMPUTERIZED TOMOGRAPHY (CT SCAN) OF THORACIC AORTA: ICD-10-PCS | Performed by: RADIOLOGY

## 2018-01-01 PROCEDURE — 87081 CULTURE SCREEN ONLY: CPT

## 2018-01-01 PROCEDURE — 99231 SBSQ HOSP IP/OBS SF/LOW 25: CPT | Performed by: PHYSICIAN ASSISTANT

## 2018-01-01 PROCEDURE — 93005 ELECTROCARDIOGRAM TRACING: CPT

## 2018-01-01 PROCEDURE — 87801 DETECT AGNT MULT DNA AMPLI: CPT

## 2018-01-01 PROCEDURE — 73060 X-RAY EXAM OF HUMERUS: CPT

## 2018-01-01 PROCEDURE — 94669 MECHANICAL CHEST WALL OSCILL: CPT

## 2018-01-01 PROCEDURE — 1036F TOBACCO NON-USER: CPT | Performed by: NURSE PRACTITIONER

## 2018-01-01 PROCEDURE — 2500000003 HC RX 250 WO HCPCS: Performed by: FAMILY MEDICINE

## 2018-01-01 PROCEDURE — 99214 OFFICE O/P EST MOD 30 MIN: CPT | Performed by: FAMILY MEDICINE

## 2018-01-01 PROCEDURE — 1111F DSCHRG MED/CURRENT MED MERGE: CPT | Performed by: NURSE PRACTITIONER

## 2018-01-01 PROCEDURE — G8598 ASA/ANTIPLAT THER USED: HCPCS | Performed by: NURSE PRACTITIONER

## 2018-01-01 PROCEDURE — 0W993ZZ DRAINAGE OF RIGHT PLEURAL CAVITY, PERCUTANEOUS APPROACH: ICD-10-PCS | Performed by: RADIOLOGY

## 2018-01-01 PROCEDURE — 72072 X-RAY EXAM THORAC SPINE 3VWS: CPT

## 2018-01-01 PROCEDURE — 1123F ACP DISCUSS/DSCN MKR DOCD: CPT | Performed by: NURSE PRACTITIONER

## 2018-01-01 PROCEDURE — 90715 TDAP VACCINE 7 YRS/> IM: CPT | Performed by: INTERNAL MEDICINE

## 2018-01-01 PROCEDURE — 99223 1ST HOSP IP/OBS HIGH 75: CPT | Performed by: HOSPITALIST

## 2018-01-01 PROCEDURE — 72125 CT NECK SPINE W/O DYE: CPT

## 2018-01-01 PROCEDURE — 99232 SBSQ HOSP IP/OBS MODERATE 35: CPT | Performed by: NURSE PRACTITIONER

## 2018-01-01 PROCEDURE — 36415 COLL VENOUS BLD VENIPUNCTURE: CPT | Performed by: FAMILY MEDICINE

## 2018-01-01 PROCEDURE — 49083 ABD PARACENTESIS W/IMAGING: CPT

## 2018-01-01 PROCEDURE — 93970 EXTREMITY STUDY: CPT

## 2018-01-01 PROCEDURE — A6223 GAUZE >16<=48 NO W/SAL W/O B: HCPCS

## 2018-01-01 PROCEDURE — A6446 CONFORM BAND S W>=3" <5"/YD: HCPCS

## 2018-01-01 PROCEDURE — 93307 TTE W/O DOPPLER COMPLETE: CPT

## 2018-01-01 RX ORDER — POLYETHYLENE GLYCOL 3350 17 G/17G
17 POWDER, FOR SOLUTION ORAL DAILY PRN
Status: DISCONTINUED | OUTPATIENT
Start: 2018-01-01 | End: 2018-01-01 | Stop reason: HOSPADM

## 2018-01-01 RX ORDER — IPRATROPIUM BROMIDE AND ALBUTEROL SULFATE 2.5; .5 MG/3ML; MG/3ML
1 SOLUTION RESPIRATORY (INHALATION)
Status: DISCONTINUED | OUTPATIENT
Start: 2018-01-01 | End: 2018-01-01 | Stop reason: HOSPADM

## 2018-01-01 RX ORDER — TAMSULOSIN HYDROCHLORIDE 0.4 MG/1
0.8 CAPSULE ORAL DAILY
Qty: 30 CAPSULE | Refills: 0 | Status: ON HOLD | OUTPATIENT
Start: 2018-01-01 | End: 2018-01-01

## 2018-01-01 RX ORDER — NICOTINE POLACRILEX 4 MG
15 LOZENGE BUCCAL PRN
Status: DISCONTINUED | OUTPATIENT
Start: 2018-01-01 | End: 2018-01-01 | Stop reason: HOSPADM

## 2018-01-01 RX ORDER — METHYLPREDNISOLONE SODIUM SUCCINATE 125 MG/2ML
125 INJECTION, POWDER, LYOPHILIZED, FOR SOLUTION INTRAMUSCULAR; INTRAVENOUS ONCE
Status: COMPLETED | OUTPATIENT
Start: 2018-01-01 | End: 2018-01-01

## 2018-01-01 RX ORDER — PREDNISONE 20 MG/1
40 TABLET ORAL DAILY
Status: DISCONTINUED | OUTPATIENT
Start: 2018-01-01 | End: 2018-01-01

## 2018-01-01 RX ORDER — ACETAMINOPHEN 650 MG/1
650 SUPPOSITORY RECTAL EVERY 4 HOURS PRN
Status: DISCONTINUED | OUTPATIENT
Start: 2018-01-01 | End: 2018-01-01 | Stop reason: HOSPADM

## 2018-01-01 RX ORDER — IPRATROPIUM BROMIDE AND ALBUTEROL SULFATE 2.5; .5 MG/3ML; MG/3ML
1 SOLUTION RESPIRATORY (INHALATION) ONCE
Status: COMPLETED | OUTPATIENT
Start: 2018-01-01 | End: 2018-01-01

## 2018-01-01 RX ORDER — METHENAMINE HIPPURATE 1000 MG/1
1 TABLET ORAL 2 TIMES DAILY
Status: DISCONTINUED | OUTPATIENT
Start: 2018-01-01 | End: 2018-01-01 | Stop reason: HOSPADM

## 2018-01-01 RX ORDER — POTASSIUM CHLORIDE 20 MEQ/1
40 TABLET, EXTENDED RELEASE ORAL PRN
Status: DISCONTINUED | OUTPATIENT
Start: 2018-01-01 | End: 2018-01-01 | Stop reason: HOSPADM

## 2018-01-01 RX ORDER — ALBUTEROL SULFATE 2.5 MG/3ML
2.5 SOLUTION RESPIRATORY (INHALATION) EVERY 4 HOURS PRN
Status: DISCONTINUED | OUTPATIENT
Start: 2018-01-01 | End: 2018-01-01 | Stop reason: HOSPADM

## 2018-01-01 RX ORDER — ACETAMINOPHEN 325 MG/1
650 TABLET ORAL EVERY 4 HOURS PRN
Status: DISCONTINUED | OUTPATIENT
Start: 2018-01-01 | End: 2018-01-01 | Stop reason: HOSPADM

## 2018-01-01 RX ORDER — METOPROLOL TARTRATE 100 MG/1
100 TABLET ORAL 2 TIMES DAILY
Status: DISCONTINUED | OUTPATIENT
Start: 2018-01-01 | End: 2018-01-01 | Stop reason: HOSPADM

## 2018-01-01 RX ORDER — LISINOPRIL 20 MG/1
20 TABLET ORAL DAILY
Status: DISCONTINUED | OUTPATIENT
Start: 2018-01-01 | End: 2018-01-01 | Stop reason: HOSPADM

## 2018-01-01 RX ORDER — INSULIN GLARGINE 100 [IU]/ML
10 INJECTION, SOLUTION SUBCUTANEOUS NIGHTLY
Status: DISCONTINUED | OUTPATIENT
Start: 2018-01-01 | End: 2018-01-01

## 2018-01-01 RX ORDER — FLUCONAZOLE 100 MG/1
100 TABLET ORAL DAILY
Qty: 7 TABLET | Refills: 0 | Status: SHIPPED | OUTPATIENT
Start: 2018-01-01 | End: 2018-01-01

## 2018-01-01 RX ORDER — POTASSIUM CHLORIDE 7.45 MG/ML
10 INJECTION INTRAVENOUS PRN
Status: DISCONTINUED | OUTPATIENT
Start: 2018-01-01 | End: 2018-01-01 | Stop reason: HOSPADM

## 2018-01-01 RX ORDER — DEXTROSE MONOHYDRATE 50 MG/ML
100 INJECTION, SOLUTION INTRAVENOUS PRN
Status: DISCONTINUED | OUTPATIENT
Start: 2018-01-01 | End: 2018-01-01 | Stop reason: ALTCHOICE

## 2018-01-01 RX ORDER — METHENAMINE HIPPURATE 1000 MG/1
1 TABLET ORAL 2 TIMES DAILY
Qty: 60 TABLET | Refills: 11 | Status: ON HOLD | OUTPATIENT
Start: 2018-01-01 | End: 2018-01-01

## 2018-01-01 RX ORDER — CLOPIDOGREL BISULFATE 75 MG/1
75 TABLET ORAL DAILY
Status: DISCONTINUED | OUTPATIENT
Start: 2018-01-01 | End: 2018-01-01 | Stop reason: HOSPADM

## 2018-01-01 RX ORDER — METOLAZONE 5 MG/1
5 TABLET ORAL DAILY
Qty: 30 TABLET | Refills: 0 | Status: SHIPPED | OUTPATIENT
Start: 2018-01-01 | End: 2018-01-01 | Stop reason: SDUPTHER

## 2018-01-01 RX ORDER — ASPIRIN 81 MG/1
81 TABLET ORAL DAILY
Status: CANCELLED | OUTPATIENT
Start: 2018-01-01

## 2018-01-01 RX ORDER — MORPHINE SULFATE/0.9% NACL/PF 1 MG/ML
SYRINGE (ML) INJECTION CONTINUOUS
Status: DISCONTINUED | OUTPATIENT
Start: 2018-01-01 | End: 2018-01-01

## 2018-01-01 RX ORDER — ACETAMINOPHEN 325 MG/1
650 TABLET ORAL EVERY 6 HOURS PRN
Status: DISCONTINUED | OUTPATIENT
Start: 2018-01-01 | End: 2018-01-01 | Stop reason: HOSPADM

## 2018-01-01 RX ORDER — FUROSEMIDE 10 MG/ML
40 INJECTION INTRAMUSCULAR; INTRAVENOUS 3 TIMES DAILY
Status: DISCONTINUED | OUTPATIENT
Start: 2018-01-01 | End: 2018-01-01

## 2018-01-01 RX ORDER — AMIODARONE HYDROCHLORIDE 200 MG/1
200 TABLET ORAL DAILY
Status: DISCONTINUED | OUTPATIENT
Start: 2018-01-01 | End: 2018-01-01 | Stop reason: HOSPADM

## 2018-01-01 RX ORDER — NICOTINE POLACRILEX 4 MG
15 LOZENGE BUCCAL PRN
Status: CANCELLED | OUTPATIENT
Start: 2018-01-01

## 2018-01-01 RX ORDER — ALBUMIN (HUMAN) 12.5 G/50ML
25 SOLUTION INTRAVENOUS ONCE
Status: COMPLETED | OUTPATIENT
Start: 2018-01-01 | End: 2018-01-01

## 2018-01-01 RX ORDER — ATORVASTATIN CALCIUM 80 MG/1
80 TABLET, FILM COATED ORAL DAILY
Status: DISCONTINUED | OUTPATIENT
Start: 2018-01-01 | End: 2018-01-01 | Stop reason: HOSPADM

## 2018-01-01 RX ORDER — SODIUM CHLORIDE 0.9 % (FLUSH) 0.9 %
10 SYRINGE (ML) INJECTION PRN
Status: DISCONTINUED | OUTPATIENT
Start: 2018-01-01 | End: 2018-01-01 | Stop reason: HOSPADM

## 2018-01-01 RX ORDER — ONDANSETRON 4 MG/1
4 TABLET, FILM COATED ORAL EVERY 6 HOURS PRN
Status: DISCONTINUED | OUTPATIENT
Start: 2018-01-01 | End: 2018-01-01

## 2018-01-01 RX ORDER — ONDANSETRON 2 MG/ML
4 INJECTION INTRAMUSCULAR; INTRAVENOUS EVERY 6 HOURS PRN
Status: DISCONTINUED | OUTPATIENT
Start: 2018-01-01 | End: 2018-01-01 | Stop reason: HOSPADM

## 2018-01-01 RX ORDER — ALBUMIN (HUMAN) 12.5 G/50ML
12.5 SOLUTION INTRAVENOUS ONCE
Status: COMPLETED | OUTPATIENT
Start: 2018-01-01 | End: 2018-01-01

## 2018-01-01 RX ORDER — METOLAZONE 5 MG/1
5 TABLET ORAL DAILY
Status: DISCONTINUED | OUTPATIENT
Start: 2018-01-01 | End: 2018-01-01 | Stop reason: HOSPADM

## 2018-01-01 RX ORDER — NITROGLYCERIN 0.4 MG/1
0.4 TABLET SUBLINGUAL EVERY 5 MIN PRN
Status: CANCELLED | OUTPATIENT
Start: 2018-01-01

## 2018-01-01 RX ORDER — SODIUM CHLORIDE 0.9 % (FLUSH) 0.9 %
10 SYRINGE (ML) INJECTION EVERY 12 HOURS SCHEDULED
Status: DISCONTINUED | OUTPATIENT
Start: 2018-01-01 | End: 2018-01-01 | Stop reason: HOSPADM

## 2018-01-01 RX ORDER — METOLAZONE 5 MG/1
5 TABLET ORAL DAILY
Status: DISCONTINUED | OUTPATIENT
Start: 2018-01-01 | End: 2018-01-01

## 2018-01-01 RX ORDER — LORAZEPAM 2 MG/ML
1 INJECTION INTRAMUSCULAR
Status: DISCONTINUED | OUTPATIENT
Start: 2018-01-01 | End: 2018-01-01 | Stop reason: HOSPADM

## 2018-01-01 RX ORDER — DOXYCYCLINE HYCLATE 100 MG/1
100 CAPSULE ORAL 2 TIMES DAILY
Status: ON HOLD | COMMUNITY
Start: 2018-01-01 | End: 2018-01-01 | Stop reason: HOSPADM

## 2018-01-01 RX ORDER — ASPIRIN 81 MG/1
81 TABLET, CHEWABLE ORAL DAILY
Status: DISCONTINUED | OUTPATIENT
Start: 2018-01-01 | End: 2018-01-01

## 2018-01-01 RX ORDER — OXYCODONE HYDROCHLORIDE AND ACETAMINOPHEN 5; 325 MG/1; MG/1
1 TABLET ORAL EVERY 4 HOURS PRN
Status: DISCONTINUED | OUTPATIENT
Start: 2018-01-01 | End: 2018-01-01 | Stop reason: HOSPADM

## 2018-01-01 RX ORDER — CLOPIDOGREL BISULFATE 75 MG/1
TABLET ORAL
Qty: 90 TABLET | Refills: 3 | Status: ON HOLD | OUTPATIENT
Start: 2018-01-01 | End: 2018-01-01

## 2018-01-01 RX ORDER — NICOTINE POLACRILEX 4 MG
15 LOZENGE BUCCAL PRN
Status: DISCONTINUED | OUTPATIENT
Start: 2018-01-01 | End: 2018-01-01 | Stop reason: ALTCHOICE

## 2018-01-01 RX ORDER — DOXAZOSIN MESYLATE 4 MG/1
4 TABLET ORAL DAILY
Status: DISCONTINUED | OUTPATIENT
Start: 2018-01-01 | End: 2018-01-01

## 2018-01-01 RX ORDER — ALBUTEROL SULFATE 2.5 MG/3ML
2.5 SOLUTION RESPIRATORY (INHALATION) EVERY 4 HOURS PRN
Status: DISCONTINUED | OUTPATIENT
Start: 2018-01-01 | End: 2018-01-01

## 2018-01-01 RX ORDER — DOXYCYCLINE HYCLATE 100 MG/1
100 CAPSULE ORAL EVERY 12 HOURS SCHEDULED
Status: DISCONTINUED | OUTPATIENT
Start: 2018-01-01 | End: 2018-01-01

## 2018-01-01 RX ORDER — ACETAMINOPHEN 325 MG/1
650 TABLET ORAL EVERY 4 HOURS PRN
Status: DISCONTINUED | OUTPATIENT
Start: 2018-01-01 | End: 2018-01-01 | Stop reason: SDUPTHER

## 2018-01-01 RX ORDER — NITROFURANTOIN 25; 75 MG/1; MG/1
100 CAPSULE ORAL 2 TIMES DAILY
Qty: 20 CAPSULE | Refills: 0 | Status: SHIPPED | OUTPATIENT
Start: 2018-01-01 | End: 2018-01-01

## 2018-01-01 RX ORDER — ALBUMIN (HUMAN) 12.5 G/50ML
12.5 SOLUTION INTRAVENOUS EVERY 6 HOURS
Status: DISPENSED | OUTPATIENT
Start: 2018-01-01 | End: 2018-01-01

## 2018-01-01 RX ORDER — TAMSULOSIN HYDROCHLORIDE 0.4 MG/1
0.4 CAPSULE ORAL DAILY
Status: DISCONTINUED | OUTPATIENT
Start: 2018-01-01 | End: 2018-01-01 | Stop reason: ALTCHOICE

## 2018-01-01 RX ORDER — BISACODYL 10 MG
10 SUPPOSITORY, RECTAL RECTAL DAILY PRN
Status: CANCELLED | OUTPATIENT
Start: 2018-01-01

## 2018-01-01 RX ORDER — SODIUM CHLORIDE 0.9 % (FLUSH) 0.9 %
10 SYRINGE (ML) INJECTION EVERY 12 HOURS SCHEDULED
Status: CANCELLED | OUTPATIENT
Start: 2018-01-01

## 2018-01-01 RX ORDER — SODIUM CHLORIDE 9 MG/ML
INJECTION, SOLUTION INTRAVENOUS CONTINUOUS
Status: CANCELLED | OUTPATIENT
Start: 2018-01-01

## 2018-01-01 RX ORDER — TETRAHYDROZOLINE HCL 0.05 %
2 DROPS OPHTHALMIC (EYE) EVERY 6 HOURS PRN
Status: DISCONTINUED | OUTPATIENT
Start: 2018-01-01 | End: 2018-01-01 | Stop reason: HOSPADM

## 2018-01-01 RX ORDER — BISACODYL 10 MG
10 SUPPOSITORY, RECTAL RECTAL DAILY PRN
Status: DISCONTINUED | OUTPATIENT
Start: 2018-01-01 | End: 2018-01-01 | Stop reason: HOSPADM

## 2018-01-01 RX ORDER — NITROGLYCERIN 0.4 MG/1
0.4 TABLET SUBLINGUAL EVERY 5 MIN PRN
Status: DISCONTINUED | OUTPATIENT
Start: 2018-01-01 | End: 2018-01-01

## 2018-01-01 RX ORDER — PREDNISONE 20 MG/1
40 TABLET ORAL DAILY
Status: DISCONTINUED | OUTPATIENT
Start: 2018-01-01 | End: 2018-01-01 | Stop reason: HOSPADM

## 2018-01-01 RX ORDER — GLYCOPYRROLATE 0.2 MG/ML
0.2 INJECTION INTRAMUSCULAR; INTRAVENOUS
Status: CANCELLED | OUTPATIENT
Start: 2018-01-01

## 2018-01-01 RX ORDER — METHENAMINE HIPPURATE 1000 MG/1
1 TABLET ORAL 2 TIMES DAILY
Status: CANCELLED | OUTPATIENT
Start: 2018-01-01

## 2018-01-01 RX ORDER — BUMETANIDE 2 MG/1
2 TABLET ORAL DAILY
Qty: 30 TABLET | Refills: 0 | Status: SHIPPED | OUTPATIENT
Start: 2018-01-01 | End: 2018-01-01 | Stop reason: SDUPTHER

## 2018-01-01 RX ORDER — DOXAZOSIN MESYLATE 4 MG/1
4 TABLET ORAL NIGHTLY
Status: DISCONTINUED | OUTPATIENT
Start: 2018-01-01 | End: 2018-01-01 | Stop reason: HOSPADM

## 2018-01-01 RX ORDER — SODIUM CHLORIDE 9 MG/ML
INJECTION, SOLUTION INTRAVENOUS CONTINUOUS
Status: DISCONTINUED | OUTPATIENT
Start: 2018-01-01 | End: 2018-01-01 | Stop reason: HOSPADM

## 2018-01-01 RX ORDER — GLYCOPYRROLATE 0.2 MG/ML
0.2 INJECTION INTRAMUSCULAR; INTRAVENOUS
Status: DISCONTINUED | OUTPATIENT
Start: 2018-01-01 | End: 2018-01-01 | Stop reason: HOSPADM

## 2018-01-01 RX ORDER — MORPHINE SULFATE 2 MG/ML
2 INJECTION, SOLUTION INTRAMUSCULAR; INTRAVENOUS ONCE
Status: COMPLETED | OUTPATIENT
Start: 2018-01-01 | End: 2018-01-01

## 2018-01-01 RX ORDER — ALBUTEROL SULFATE 2.5 MG/3ML
2.5 SOLUTION RESPIRATORY (INHALATION)
Status: DISCONTINUED | OUTPATIENT
Start: 2018-01-01 | End: 2018-01-01 | Stop reason: HOSPADM

## 2018-01-01 RX ORDER — DEXTROSE AND SODIUM CHLORIDE 5; .9 G/100ML; G/100ML
100 INJECTION, SOLUTION INTRAVENOUS PRN
Status: DISCONTINUED | OUTPATIENT
Start: 2018-01-01 | End: 2018-01-01 | Stop reason: HOSPADM

## 2018-01-01 RX ORDER — AMIODARONE HYDROCHLORIDE 200 MG/1
200 TABLET ORAL DAILY
COMMUNITY
End: 2018-01-01 | Stop reason: SDUPTHER

## 2018-01-01 RX ORDER — DOXAZOSIN MESYLATE 1 MG/1
1 TABLET ORAL DAILY
Status: DISCONTINUED | OUTPATIENT
Start: 2018-01-01 | End: 2018-01-01

## 2018-01-01 RX ORDER — POTASSIUM CHLORIDE 20MEQ/15ML
40 LIQUID (ML) ORAL PRN
Status: DISCONTINUED | OUTPATIENT
Start: 2018-01-01 | End: 2018-01-01 | Stop reason: HOSPADM

## 2018-01-01 RX ORDER — SODIUM CHLORIDE 9 MG/ML
INJECTION, SOLUTION INTRAVENOUS CONTINUOUS
Status: DISCONTINUED | OUTPATIENT
Start: 2018-01-01 | End: 2018-01-01

## 2018-01-01 RX ORDER — BUMETANIDE 2 MG/1
2 TABLET ORAL DAILY
Qty: 30 TABLET | Refills: 11 | Status: ON HOLD | OUTPATIENT
Start: 2018-01-01 | End: 2018-01-01 | Stop reason: HOSPADM

## 2018-01-01 RX ORDER — OSELTAMIVIR PHOSPHATE 30 MG/1
30 CAPSULE ORAL 2 TIMES DAILY
Status: COMPLETED | OUTPATIENT
Start: 2018-01-01 | End: 2018-01-01

## 2018-01-01 RX ORDER — ATORVASTATIN CALCIUM 80 MG/1
80 TABLET, FILM COATED ORAL DAILY
Status: CANCELLED | OUTPATIENT
Start: 2018-01-01

## 2018-01-01 RX ORDER — POLYVINYL ALCOHOL 14 MG/ML
1 SOLUTION/ DROPS OPHTHALMIC PRN
Status: DISCONTINUED | OUTPATIENT
Start: 2018-01-01 | End: 2018-01-01 | Stop reason: HOSPADM

## 2018-01-01 RX ORDER — PROPAFENONE HYDROCHLORIDE 150 MG/1
150 TABLET, FILM COATED ORAL EVERY 8 HOURS
COMMUNITY
End: 2018-01-01 | Stop reason: ALTCHOICE

## 2018-01-01 RX ORDER — ASPIRIN 81 MG/1
81 TABLET ORAL DAILY
Status: DISCONTINUED | OUTPATIENT
Start: 2018-01-01 | End: 2018-01-01 | Stop reason: HOSPADM

## 2018-01-01 RX ORDER — SULFAMETHOXAZOLE AND TRIMETHOPRIM 400; 80 MG/1; MG/1
1 TABLET ORAL DAILY
Qty: 7 TABLET | Refills: 0 | Status: SHIPPED | OUTPATIENT
Start: 2018-01-01 | End: 2018-01-01

## 2018-01-01 RX ORDER — DEXTROSE MONOHYDRATE 50 MG/ML
100 INJECTION, SOLUTION INTRAVENOUS PRN
Status: DISCONTINUED | OUTPATIENT
Start: 2018-01-01 | End: 2018-01-01 | Stop reason: HOSPADM

## 2018-01-01 RX ORDER — NITROGLYCERIN 0.4 MG/1
0.4 TABLET SUBLINGUAL EVERY 5 MIN PRN
Status: DISCONTINUED | OUTPATIENT
Start: 2018-01-01 | End: 2018-01-01 | Stop reason: HOSPADM

## 2018-01-01 RX ORDER — PROPAFENONE HYDROCHLORIDE 150 MG/1
150 TABLET, FILM COATED ORAL EVERY 8 HOURS
Status: DISCONTINUED | OUTPATIENT
Start: 2018-01-01 | End: 2018-01-01 | Stop reason: CLARIF

## 2018-01-01 RX ORDER — SULFACETAMIDE SODIUM 100 MG/ML
2 SOLUTION/ DROPS OPHTHALMIC
Qty: 1 BOTTLE | Refills: 0 | COMMUNITY
Start: 2018-01-01 | End: 2018-01-01

## 2018-01-01 RX ORDER — METOPROLOL TARTRATE 50 MG/1
100 TABLET, FILM COATED ORAL 2 TIMES DAILY
Status: DISCONTINUED | OUTPATIENT
Start: 2018-01-01 | End: 2018-01-01 | Stop reason: HOSPADM

## 2018-01-01 RX ORDER — GUAIFENESIN 600 MG/1
600 TABLET, EXTENDED RELEASE ORAL 2 TIMES DAILY
Qty: 20 TABLET | Refills: 0 | Status: ON HOLD | OUTPATIENT
Start: 2018-01-01 | End: 2018-01-01

## 2018-01-01 RX ORDER — SODIUM CHLORIDE 0.9 % (FLUSH) 0.9 %
10 SYRINGE (ML) INJECTION PRN
Status: CANCELLED | OUTPATIENT
Start: 2018-01-01

## 2018-01-01 RX ORDER — FUROSEMIDE 10 MG/ML
60 INJECTION INTRAMUSCULAR; INTRAVENOUS 2 TIMES DAILY
Status: DISCONTINUED | OUTPATIENT
Start: 2018-01-01 | End: 2018-01-01

## 2018-01-01 RX ORDER — HEPARIN SODIUM 5000 [USP'U]/ML
5000 INJECTION, SOLUTION INTRAVENOUS; SUBCUTANEOUS 2 TIMES DAILY
Status: DISCONTINUED | OUTPATIENT
Start: 2018-01-01 | End: 2018-01-01 | Stop reason: HOSPADM

## 2018-01-01 RX ORDER — METHENAMINE HIPPURATE 1000 MG/1
1 TABLET ORAL 2 TIMES DAILY WITH MEALS
Status: DISCONTINUED | OUTPATIENT
Start: 2018-01-01 | End: 2018-01-01 | Stop reason: HOSPADM

## 2018-01-01 RX ORDER — DEXTROSE MONOHYDRATE 25 G/50ML
12.5 INJECTION, SOLUTION INTRAVENOUS PRN
Status: DISCONTINUED | OUTPATIENT
Start: 2018-01-01 | End: 2018-01-01 | Stop reason: ALTCHOICE

## 2018-01-01 RX ORDER — SULFACETAMIDE SODIUM 100 MG/ML
2 SOLUTION/ DROPS OPHTHALMIC
Status: DISCONTINUED | OUTPATIENT
Start: 2018-01-01 | End: 2018-01-01 | Stop reason: HOSPADM

## 2018-01-01 RX ORDER — POTASSIUM CHLORIDE 20 MEQ/1
40 TABLET, EXTENDED RELEASE ORAL ONCE
Status: COMPLETED | OUTPATIENT
Start: 2018-01-01 | End: 2018-01-01

## 2018-01-01 RX ORDER — DEXTROSE MONOHYDRATE 25 G/50ML
12.5 INJECTION, SOLUTION INTRAVENOUS PRN
Status: DISCONTINUED | OUTPATIENT
Start: 2018-01-01 | End: 2018-01-01 | Stop reason: HOSPADM

## 2018-01-01 RX ORDER — TERAZOSIN 5 MG/1
5 CAPSULE ORAL NIGHTLY
Status: DISCONTINUED | OUTPATIENT
Start: 2018-01-01 | End: 2018-01-01 | Stop reason: CLARIF

## 2018-01-01 RX ORDER — DOXAZOSIN MESYLATE 1 MG/1
1 TABLET ORAL DAILY
Status: CANCELLED | OUTPATIENT
Start: 2018-01-01

## 2018-01-01 RX ORDER — ACETAMINOPHEN 650 MG/1
650 SUPPOSITORY RECTAL EVERY 4 HOURS PRN
Status: CANCELLED | OUTPATIENT
Start: 2018-01-01

## 2018-01-01 RX ORDER — CLOPIDOGREL BISULFATE 75 MG/1
75 TABLET ORAL DAILY
Status: CANCELLED | OUTPATIENT
Start: 2018-01-01

## 2018-01-01 RX ORDER — TAMSULOSIN HYDROCHLORIDE 0.4 MG/1
0.8 CAPSULE ORAL DAILY
Status: DISCONTINUED | OUTPATIENT
Start: 2018-01-01 | End: 2018-01-01 | Stop reason: HOSPADM

## 2018-01-01 RX ORDER — AMIODARONE HYDROCHLORIDE 200 MG/1
100 TABLET ORAL DAILY
Status: DISCONTINUED | OUTPATIENT
Start: 2018-01-01 | End: 2018-01-01 | Stop reason: ALTCHOICE

## 2018-01-01 RX ORDER — TAMSULOSIN HYDROCHLORIDE 0.4 MG/1
0.8 CAPSULE ORAL DAILY
Status: DISCONTINUED | OUTPATIENT
Start: 2018-01-01 | End: 2018-01-01 | Stop reason: ALTCHOICE

## 2018-01-01 RX ORDER — ONDANSETRON 4 MG/1
4 TABLET, FILM COATED ORAL EVERY 6 HOURS PRN
Status: CANCELLED | OUTPATIENT
Start: 2018-01-01

## 2018-01-01 RX ORDER — M-VIT,TX,IRON,MINS/CALC/FOLIC 27MG-0.4MG
1 TABLET ORAL DAILY
Status: DISCONTINUED | OUTPATIENT
Start: 2018-01-01 | End: 2018-01-01 | Stop reason: HOSPADM

## 2018-01-01 RX ORDER — PRIMIDONE 50 MG/1
50 TABLET ORAL NIGHTLY
Status: DISCONTINUED | OUTPATIENT
Start: 2018-01-01 | End: 2018-01-01 | Stop reason: HOSPADM

## 2018-01-01 RX ORDER — IPRATROPIUM BROMIDE AND ALBUTEROL SULFATE 2.5; .5 MG/3ML; MG/3ML
1 SOLUTION RESPIRATORY (INHALATION)
Status: CANCELLED | OUTPATIENT
Start: 2018-01-01

## 2018-01-01 RX ORDER — TETRAHYDROZOLINE HCL 0.05 %
2 DROPS OPHTHALMIC (EYE) EVERY 6 HOURS PRN
Status: CANCELLED | OUTPATIENT
Start: 2018-01-01

## 2018-01-01 RX ORDER — MORPHINE SULFATE/0.9% NACL/PF 1 MG/ML
SYRINGE (ML) INJECTION CONTINUOUS
Status: CANCELLED | OUTPATIENT
Start: 2018-01-01

## 2018-01-01 RX ORDER — AMIODARONE HYDROCHLORIDE 200 MG/1
200 TABLET ORAL DAILY
Status: CANCELLED | OUTPATIENT
Start: 2018-01-01

## 2018-01-01 RX ORDER — ALBUTEROL SULFATE 2.5 MG/3ML
2.5 SOLUTION RESPIRATORY (INHALATION) EVERY 4 HOURS PRN
Status: CANCELLED | OUTPATIENT
Start: 2018-01-01

## 2018-01-01 RX ORDER — INSULIN GLARGINE 100 [IU]/ML
12 INJECTION, SOLUTION SUBCUTANEOUS NIGHTLY
Status: DISCONTINUED | OUTPATIENT
Start: 2018-01-01 | End: 2018-01-01 | Stop reason: ALTCHOICE

## 2018-01-01 RX ORDER — TAMSULOSIN HYDROCHLORIDE 0.4 MG/1
0.4 CAPSULE ORAL DAILY
Status: DISCONTINUED | OUTPATIENT
Start: 2018-01-01 | End: 2018-01-01

## 2018-01-01 RX ORDER — LORAZEPAM 2 MG/ML
1 INJECTION INTRAMUSCULAR
Status: CANCELLED | OUTPATIENT
Start: 2018-01-01

## 2018-01-01 RX ORDER — PREDNISONE 20 MG/1
20 TABLET ORAL 2 TIMES DAILY
Status: ON HOLD | COMMUNITY
Start: 2018-01-01 | End: 2018-01-01 | Stop reason: HOSPADM

## 2018-01-01 RX ORDER — FLUCONAZOLE 200 MG/1
200 TABLET ORAL DAILY
Status: DISCONTINUED | OUTPATIENT
Start: 2018-01-01 | End: 2018-01-01 | Stop reason: ALTCHOICE

## 2018-01-01 RX ORDER — DOCUSATE SODIUM 100 MG/1
100 CAPSULE, LIQUID FILLED ORAL DAILY PRN
Status: DISCONTINUED | OUTPATIENT
Start: 2018-01-01 | End: 2018-01-01 | Stop reason: HOSPADM

## 2018-01-01 RX ORDER — FUROSEMIDE 10 MG/ML
40 INJECTION INTRAMUSCULAR; INTRAVENOUS 2 TIMES DAILY
Status: DISCONTINUED | OUTPATIENT
Start: 2018-01-01 | End: 2018-01-01

## 2018-01-01 RX ORDER — INSULIN GLARGINE 100 [IU]/ML
12 INJECTION, SOLUTION SUBCUTANEOUS NIGHTLY
Status: DISCONTINUED | OUTPATIENT
Start: 2018-01-01 | End: 2018-01-01 | Stop reason: HOSPADM

## 2018-01-01 RX ORDER — ACETAMINOPHEN 325 MG/1
650 TABLET ORAL EVERY 6 HOURS PRN
Status: CANCELLED | OUTPATIENT
Start: 2018-01-01

## 2018-01-01 RX ORDER — CIPROFLOXACIN 500 MG/1
500 TABLET, FILM COATED ORAL 2 TIMES DAILY
Qty: 20 TABLET | Refills: 0 | Status: SHIPPED | OUTPATIENT
Start: 2018-01-01 | End: 2018-01-01

## 2018-01-01 RX ORDER — SODIUM CHLORIDE 0.9 % (FLUSH) 0.9 %
10 SYRINGE (ML) INJECTION PRN
Status: DISCONTINUED | OUTPATIENT
Start: 2018-01-01 | End: 2018-01-01

## 2018-01-01 RX ORDER — ONDANSETRON 4 MG/1
4 TABLET, FILM COATED ORAL EVERY 6 HOURS PRN
Status: DISCONTINUED | OUTPATIENT
Start: 2018-01-01 | End: 2018-01-01 | Stop reason: HOSPADM

## 2018-01-01 RX ORDER — ONDANSETRON 2 MG/ML
4 INJECTION INTRAMUSCULAR; INTRAVENOUS EVERY 6 HOURS PRN
Status: DISCONTINUED | OUTPATIENT
Start: 2018-01-01 | End: 2018-01-01 | Stop reason: CLARIF

## 2018-01-01 RX ORDER — CLOPIDOGREL BISULFATE 75 MG/1
75 TABLET ORAL DAILY
Status: DISCONTINUED | OUTPATIENT
Start: 2018-01-01 | End: 2018-01-01

## 2018-01-01 RX ORDER — ATORVASTATIN CALCIUM 80 MG/1
80 TABLET, FILM COATED ORAL NIGHTLY
Status: DISCONTINUED | OUTPATIENT
Start: 2018-01-01 | End: 2018-01-01

## 2018-01-01 RX ORDER — SENNA PLUS 8.6 MG/1
1 TABLET ORAL NIGHTLY PRN
Status: DISCONTINUED | OUTPATIENT
Start: 2018-01-01 | End: 2018-01-01 | Stop reason: HOSPADM

## 2018-01-01 RX ORDER — SODIUM CHLORIDE 0.9 % (FLUSH) 0.9 %
10 SYRINGE (ML) INJECTION EVERY 12 HOURS SCHEDULED
Status: DISCONTINUED | OUTPATIENT
Start: 2018-01-01 | End: 2018-01-01

## 2018-01-01 RX ORDER — ONDANSETRON 4 MG/1
4 TABLET, ORALLY DISINTEGRATING ORAL EVERY 8 HOURS PRN
Status: DISCONTINUED | OUTPATIENT
Start: 2018-01-01 | End: 2018-01-01 | Stop reason: HOSPADM

## 2018-01-01 RX ORDER — OMEGA-3/DHA/EPA/FISH OIL 300-1000MG
1 CAPSULE ORAL DAILY
Status: DISCONTINUED | OUTPATIENT
Start: 2018-01-01 | End: 2018-01-01 | Stop reason: CLARIF

## 2018-01-01 RX ORDER — LORAZEPAM 2 MG/ML
0.5 INJECTION INTRAMUSCULAR
Status: DISCONTINUED | OUTPATIENT
Start: 2018-01-01 | End: 2018-01-01 | Stop reason: HOSPADM

## 2018-01-01 RX ORDER — LEVOFLOXACIN 5 MG/ML
500 INJECTION, SOLUTION INTRAVENOUS ONCE
Status: COMPLETED | OUTPATIENT
Start: 2018-01-01 | End: 2018-01-01

## 2018-01-01 RX ORDER — MORPHINE SULFATE/0.9% NACL/PF 1 MG/ML
SYRINGE (ML) INJECTION CONTINUOUS
Status: DISCONTINUED | OUTPATIENT
Start: 2018-01-01 | End: 2018-01-01 | Stop reason: HOSPADM

## 2018-01-01 RX ORDER — LORAZEPAM 2 MG/ML
0.5 INJECTION INTRAMUSCULAR
Status: CANCELLED | OUTPATIENT
Start: 2018-01-01

## 2018-01-01 RX ORDER — FAMOTIDINE 20 MG/1
20 TABLET, FILM COATED ORAL DAILY
Status: DISCONTINUED | OUTPATIENT
Start: 2018-01-01 | End: 2018-01-01 | Stop reason: HOSPADM

## 2018-01-01 RX ORDER — IPRATROPIUM BROMIDE AND ALBUTEROL SULFATE 2.5; .5 MG/3ML; MG/3ML
1 SOLUTION RESPIRATORY (INHALATION)
Status: DISCONTINUED | OUTPATIENT
Start: 2018-01-01 | End: 2018-01-01

## 2018-01-01 RX ORDER — MORPHINE SULFATE 4 MG/ML
4 INJECTION, SOLUTION INTRAMUSCULAR; INTRAVENOUS
Status: DISCONTINUED | OUTPATIENT
Start: 2018-01-01 | End: 2018-01-01 | Stop reason: HOSPADM

## 2018-01-01 RX ORDER — OSELTAMIVIR PHOSPHATE 30 MG/1
30 CAPSULE ORAL ONCE
Status: DISCONTINUED | OUTPATIENT
Start: 2018-01-01 | End: 2018-01-01

## 2018-01-01 RX ORDER — HALOPERIDOL 5 MG/ML
2 INJECTION INTRAMUSCULAR EVERY 6 HOURS PRN
Status: DISCONTINUED | OUTPATIENT
Start: 2018-01-01 | End: 2018-01-01

## 2018-01-01 RX ORDER — AMIODARONE HYDROCHLORIDE 200 MG/1
200 TABLET ORAL DAILY
Qty: 30 TABLET | Refills: 11 | Status: ON HOLD | OUTPATIENT
Start: 2018-01-01 | End: 2018-01-01

## 2018-01-01 RX ORDER — METHENAMINE HIPPURATE 1000 MG/1
1 TABLET ORAL 2 TIMES DAILY
Status: DISCONTINUED | OUTPATIENT
Start: 2018-01-01 | End: 2018-01-01

## 2018-01-01 RX ORDER — UBIDECARENONE 30 MG
10 CAPSULE ORAL DAILY
Status: DISCONTINUED | OUTPATIENT
Start: 2018-01-01 | End: 2018-01-01

## 2018-01-01 RX ORDER — PREDNISONE 20 MG/1
40 TABLET ORAL DAILY
Qty: 8 TABLET | Refills: 0 | Status: SHIPPED | OUTPATIENT
Start: 2018-01-01 | End: 2018-01-01

## 2018-01-01 RX ORDER — MORPHINE SULFATE 2 MG/ML
2 INJECTION, SOLUTION INTRAMUSCULAR; INTRAVENOUS
Status: DISCONTINUED | OUTPATIENT
Start: 2018-01-01 | End: 2018-01-01 | Stop reason: HOSPADM

## 2018-01-01 RX ORDER — HYDRALAZINE HYDROCHLORIDE 10 MG/1
10 TABLET, FILM COATED ORAL EVERY 6 HOURS SCHEDULED
Status: DISCONTINUED | OUTPATIENT
Start: 2018-01-01 | End: 2018-01-01 | Stop reason: HOSPADM

## 2018-01-01 RX ORDER — ACETAMINOPHEN 325 MG/1
650 TABLET ORAL EVERY 6 HOURS PRN
Status: ON HOLD | COMMUNITY
Start: 2018-01-01 | End: 2018-01-01

## 2018-01-01 RX ORDER — PANTOPRAZOLE SODIUM 40 MG/1
40 TABLET, DELAYED RELEASE ORAL
Status: DISCONTINUED | OUTPATIENT
Start: 2018-01-01 | End: 2018-01-01 | Stop reason: HOSPADM

## 2018-01-01 RX ORDER — TETRAHYDROZOLINE HCL 0.05 %
2 DROPS OPHTHALMIC (EYE) EVERY 6 HOURS PRN
Status: DISCONTINUED | OUTPATIENT
Start: 2018-01-01 | End: 2018-01-01

## 2018-01-01 RX ORDER — FUROSEMIDE 40 MG/1
40 TABLET ORAL DAILY
Status: DISCONTINUED | OUTPATIENT
Start: 2018-01-01 | End: 2018-01-01

## 2018-01-01 RX ORDER — GUAIFENESIN 600 MG/1
600 TABLET, EXTENDED RELEASE ORAL 2 TIMES DAILY
Status: DISCONTINUED | OUTPATIENT
Start: 2018-01-01 | End: 2018-01-01 | Stop reason: HOSPADM

## 2018-01-01 RX ORDER — LISINOPRIL 20 MG/1
20 TABLET ORAL DAILY
Qty: 30 TABLET | Refills: 3 | Status: ON HOLD | OUTPATIENT
Start: 2018-01-01 | End: 2018-01-01 | Stop reason: HOSPADM

## 2018-01-01 RX ORDER — FUROSEMIDE 10 MG/ML
20 INJECTION INTRAMUSCULAR; INTRAVENOUS 3 TIMES DAILY
Status: DISCONTINUED | OUTPATIENT
Start: 2018-01-01 | End: 2018-01-01

## 2018-01-01 RX ORDER — METOPROLOL TARTRATE 50 MG/1
50 TABLET, FILM COATED ORAL 2 TIMES DAILY
Status: DISCONTINUED | OUTPATIENT
Start: 2018-01-01 | End: 2018-01-01 | Stop reason: ALTCHOICE

## 2018-01-01 RX ORDER — FUROSEMIDE 10 MG/ML
60 INJECTION INTRAMUSCULAR; INTRAVENOUS 2 TIMES DAILY
Status: DISCONTINUED | OUTPATIENT
Start: 2018-01-01 | End: 2018-01-01 | Stop reason: HOSPADM

## 2018-01-01 RX ORDER — HALOPERIDOL 5 MG/ML
2 INJECTION INTRAMUSCULAR EVERY 6 HOURS PRN
Status: CANCELLED | OUTPATIENT
Start: 2018-01-01

## 2018-01-01 RX ORDER — PANTOPRAZOLE SODIUM 40 MG/1
40 TABLET, DELAYED RELEASE ORAL DAILY
Status: DISCONTINUED | OUTPATIENT
Start: 2018-01-01 | End: 2018-01-01 | Stop reason: HOSPADM

## 2018-01-01 RX ORDER — DOXAZOSIN MESYLATE 1 MG/1
1 TABLET ORAL DAILY
Status: DISCONTINUED | OUTPATIENT
Start: 2018-01-01 | End: 2018-01-01 | Stop reason: HOSPADM

## 2018-01-01 RX ORDER — HYDRALAZINE HYDROCHLORIDE 10 MG/1
10 TABLET, FILM COATED ORAL EVERY 8 HOURS SCHEDULED
Status: DISCONTINUED | OUTPATIENT
Start: 2018-01-01 | End: 2018-01-01

## 2018-01-01 RX ORDER — HALOPERIDOL 5 MG/ML
2 INJECTION INTRAMUSCULAR EVERY 6 HOURS PRN
Status: DISCONTINUED | OUTPATIENT
Start: 2018-01-01 | End: 2018-01-01 | Stop reason: HOSPADM

## 2018-01-01 RX ORDER — AMIODARONE HYDROCHLORIDE 200 MG/1
200 TABLET ORAL DAILY
Status: DISCONTINUED | OUTPATIENT
Start: 2018-01-01 | End: 2018-01-01

## 2018-01-01 RX ORDER — MORPHINE SULFATE 2 MG/ML
2 INJECTION, SOLUTION INTRAMUSCULAR; INTRAVENOUS
Status: DISCONTINUED | OUTPATIENT
Start: 2018-01-01 | End: 2018-01-01

## 2018-01-01 RX ORDER — FUROSEMIDE 10 MG/ML
40 INJECTION INTRAMUSCULAR; INTRAVENOUS DAILY
Status: DISCONTINUED | OUTPATIENT
Start: 2018-01-01 | End: 2018-01-01

## 2018-01-01 RX ORDER — IPRATROPIUM BROMIDE AND ALBUTEROL SULFATE 2.5; .5 MG/3ML; MG/3ML
1 SOLUTION RESPIRATORY (INHALATION)
Status: DISCONTINUED | OUTPATIENT
Start: 2018-01-01 | End: 2018-01-01 | Stop reason: SDUPTHER

## 2018-01-01 RX ORDER — METOLAZONE 5 MG/1
5 TABLET ORAL DAILY
Qty: 30 TABLET | Refills: 11 | Status: ON HOLD | OUTPATIENT
Start: 2018-01-01 | End: 2018-01-01

## 2018-01-01 RX ORDER — MORPHINE SULFATE 20 MG/ML
5 SOLUTION ORAL
Status: DISCONTINUED | OUTPATIENT
Start: 2018-01-01 | End: 2018-01-01 | Stop reason: HOSPADM

## 2018-01-01 RX ORDER — METHENAMINE HIPPURATE 1000 MG/1
1 TABLET ORAL DAILY
Status: DISCONTINUED | OUTPATIENT
Start: 2018-01-01 | End: 2018-01-01 | Stop reason: HOSPADM

## 2018-01-01 RX ORDER — METOPROLOL TARTRATE 100 MG/1
100 TABLET ORAL 2 TIMES DAILY
Status: CANCELLED | OUTPATIENT
Start: 2018-01-01

## 2018-01-01 RX ORDER — METOPROLOL TARTRATE 100 MG/1
100 TABLET ORAL 2 TIMES DAILY
Status: DISCONTINUED | OUTPATIENT
Start: 2018-01-01 | End: 2018-01-01

## 2018-01-01 RX ADMIN — DOXAZOSIN 4 MG: 4 TABLET ORAL at 21:33

## 2018-01-01 RX ADMIN — OSELTAMIVIR PHOSPHATE 30 MG: 30 CAPSULE ORAL at 20:40

## 2018-01-01 RX ADMIN — IPRATROPIUM BROMIDE AND ALBUTEROL SULFATE 1 AMPULE: .5; 3 SOLUTION RESPIRATORY (INHALATION) at 13:13

## 2018-01-01 RX ADMIN — ASPIRIN 81 MG: 81 TABLET, COATED ORAL at 08:16

## 2018-01-01 RX ADMIN — ATORVASTATIN CALCIUM 80 MG: 80 TABLET, FILM COATED ORAL at 20:04

## 2018-01-01 RX ADMIN — ATORVASTATIN CALCIUM 80 MG: 80 TABLET, FILM COATED ORAL at 08:55

## 2018-01-01 RX ADMIN — CLOPIDOGREL BISULFATE 75 MG: 75 TABLET ORAL at 08:17

## 2018-01-01 RX ADMIN — Medication 10 ML: at 20:08

## 2018-01-01 RX ADMIN — Medication 2 PUFF: at 20:07

## 2018-01-01 RX ADMIN — ASPIRIN 81 MG: 81 TABLET, COATED ORAL at 08:56

## 2018-01-01 RX ADMIN — IPRATROPIUM BROMIDE AND ALBUTEROL SULFATE 1 AMPULE: .5; 3 SOLUTION RESPIRATORY (INHALATION) at 11:47

## 2018-01-01 RX ADMIN — Medication 10 ML: at 08:32

## 2018-01-01 RX ADMIN — GUAIFENESIN 600 MG: 600 TABLET, EXTENDED RELEASE ORAL at 08:46

## 2018-01-01 RX ADMIN — INSULIN LISPRO 1 UNITS: 100 INJECTION, SOLUTION INTRAVENOUS; SUBCUTANEOUS at 20:38

## 2018-01-01 RX ADMIN — INSULIN LISPRO 4 UNITS: 100 INJECTION, SOLUTION INTRAVENOUS; SUBCUTANEOUS at 08:09

## 2018-01-01 RX ADMIN — ASPIRIN 81 MG: 81 TABLET, COATED ORAL at 11:31

## 2018-01-01 RX ADMIN — PIPERACILLIN SODIUM AND TAZOBACTAM SODIUM 3.38 G: 3; .375 INJECTION, POWDER, LYOPHILIZED, FOR SOLUTION INTRAVENOUS at 06:56

## 2018-01-01 RX ADMIN — METOPROLOL TARTRATE 100 MG: 100 TABLET, FILM COATED ORAL at 20:08

## 2018-01-01 RX ADMIN — INSULIN LISPRO 4 UNITS: 100 INJECTION, SOLUTION INTRAVENOUS; SUBCUTANEOUS at 12:36

## 2018-01-01 RX ADMIN — METHENAMINE HIPPURATE 1 G: 1 TABLET ORAL at 11:31

## 2018-01-01 RX ADMIN — AMIODARONE HYDROCHLORIDE 200 MG: 200 TABLET ORAL at 21:42

## 2018-01-01 RX ADMIN — METOPROLOL TARTRATE 100 MG: 100 TABLET, FILM COATED ORAL at 21:57

## 2018-01-01 RX ADMIN — FUROSEMIDE 60 MG: 10 INJECTION, SOLUTION INTRAMUSCULAR; INTRAVENOUS at 09:19

## 2018-01-01 RX ADMIN — FAMOTIDINE 20 MG: 20 TABLET ORAL at 09:59

## 2018-01-01 RX ADMIN — METHENAMINE HIPPURATE 1 G: 1 TABLET ORAL at 08:55

## 2018-01-01 RX ADMIN — ENOXAPARIN SODIUM 40 MG: 40 INJECTION SUBCUTANEOUS at 16:57

## 2018-01-01 RX ADMIN — TAMSULOSIN HYDROCHLORIDE 0.8 MG: 0.4 CAPSULE ORAL at 09:33

## 2018-01-01 RX ADMIN — DOXAZOSIN 4 MG: 4 TABLET ORAL at 21:27

## 2018-01-01 RX ADMIN — INSULIN LISPRO 4 UNITS: 100 INJECTION, SOLUTION INTRAVENOUS; SUBCUTANEOUS at 12:00

## 2018-01-01 RX ADMIN — MULTIPLE VITAMINS W/ MINERALS TAB 1 TABLET: TAB at 08:59

## 2018-01-01 RX ADMIN — Medication 15 G: at 06:04

## 2018-01-01 RX ADMIN — METOPROLOL TARTRATE 100 MG: 100 TABLET, FILM COATED ORAL at 20:59

## 2018-01-01 RX ADMIN — SODIUM CHLORIDE: 9 INJECTION, SOLUTION INTRAVENOUS at 04:11

## 2018-01-01 RX ADMIN — METHENAMINE HIPPURATE 1 G: 1 TABLET ORAL at 08:49

## 2018-01-01 RX ADMIN — CLOPIDOGREL BISULFATE 75 MG: 75 TABLET ORAL at 09:33

## 2018-01-01 RX ADMIN — Medication 2 UNITS: at 09:40

## 2018-01-01 RX ADMIN — Medication 2 PUFF: at 07:46

## 2018-01-01 RX ADMIN — IPRATROPIUM BROMIDE AND ALBUTEROL SULFATE 1 AMPULE: .5; 3 SOLUTION RESPIRATORY (INHALATION) at 12:09

## 2018-01-01 RX ADMIN — IOPAMIDOL 85 ML: 755 INJECTION, SOLUTION INTRAVENOUS at 11:08

## 2018-01-01 RX ADMIN — ACETAMINOPHEN 650 MG: 325 TABLET ORAL at 08:56

## 2018-01-01 RX ADMIN — ALBUTEROL SULFATE 2.5 MG: 2.5 SOLUTION RESPIRATORY (INHALATION) at 07:46

## 2018-01-01 RX ADMIN — ASPIRIN 81 MG: 81 TABLET, COATED ORAL at 09:38

## 2018-01-01 RX ADMIN — INSULIN GLARGINE 12 UNITS: 100 INJECTION, SOLUTION SUBCUTANEOUS at 21:28

## 2018-01-01 RX ADMIN — PANTOPRAZOLE SODIUM 40 MG: 40 TABLET, DELAYED RELEASE ORAL at 08:16

## 2018-01-01 RX ADMIN — METHENAMINE HIPPURATE 1 G: 1 TABLET ORAL at 16:44

## 2018-01-01 RX ADMIN — Medication 2 PUFF: at 08:37

## 2018-01-01 RX ADMIN — ALBUMIN (HUMAN) 25 G: 0.25 INJECTION, SOLUTION INTRAVENOUS at 11:18

## 2018-01-01 RX ADMIN — AMIODARONE HYDROCHLORIDE 200 MG: 200 TABLET ORAL at 09:00

## 2018-01-01 RX ADMIN — ENOXAPARIN SODIUM 40 MG: 40 INJECTION SUBCUTANEOUS at 21:09

## 2018-01-01 RX ADMIN — ASPIRIN 81 MG: 81 TABLET, COATED ORAL at 09:42

## 2018-01-01 RX ADMIN — ASPIRIN 81 MG: 81 TABLET, COATED ORAL at 08:08

## 2018-01-01 RX ADMIN — Medication 2 PUFF: at 21:21

## 2018-01-01 RX ADMIN — ASPIRIN 81 MG: 81 TABLET, COATED ORAL at 08:14

## 2018-01-01 RX ADMIN — HEPARIN SODIUM 5000 UNITS: 5000 INJECTION, SOLUTION INTRAVENOUS; SUBCUTANEOUS at 09:00

## 2018-01-01 RX ADMIN — INSULIN LISPRO 4 UNITS: 100 INJECTION, SOLUTION INTRAVENOUS; SUBCUTANEOUS at 17:14

## 2018-01-01 RX ADMIN — IPRATROPIUM BROMIDE AND ALBUTEROL SULFATE 1 AMPULE: .5; 3 SOLUTION RESPIRATORY (INHALATION) at 12:44

## 2018-01-01 RX ADMIN — AMIODARONE HYDROCHLORIDE 200 MG: 200 TABLET ORAL at 08:47

## 2018-01-01 RX ADMIN — CLOPIDOGREL BISULFATE 75 MG: 75 TABLET ORAL at 09:59

## 2018-01-01 RX ADMIN — FAMOTIDINE 20 MG: 20 TABLET ORAL at 11:31

## 2018-01-01 RX ADMIN — ENOXAPARIN SODIUM 40 MG: 40 INJECTION SUBCUTANEOUS at 22:09

## 2018-01-01 RX ADMIN — VANCOMYCIN HYDROCHLORIDE 1500 MG: 500 INJECTION, POWDER, LYOPHILIZED, FOR SOLUTION INTRAVENOUS at 16:53

## 2018-01-01 RX ADMIN — DOXAZOSIN 4 MG: 4 TABLET ORAL at 08:57

## 2018-01-01 RX ADMIN — IPRATROPIUM BROMIDE AND ALBUTEROL SULFATE 1 AMPULE: .5; 3 SOLUTION RESPIRATORY (INHALATION) at 20:21

## 2018-01-01 RX ADMIN — METOLAZONE 5 MG: 5 TABLET ORAL at 10:24

## 2018-01-01 RX ADMIN — AMIODARONE HYDROCHLORIDE 200 MG: 200 TABLET ORAL at 09:23

## 2018-01-01 RX ADMIN — RIFAXIMIN 550 MG: 550 TABLET ORAL at 20:08

## 2018-01-01 RX ADMIN — HYDRALAZINE HYDROCHLORIDE 10 MG: 10 TABLET, FILM COATED ORAL at 04:30

## 2018-01-01 RX ADMIN — METOPROLOL TARTRATE 100 MG: 100 TABLET, FILM COATED ORAL at 09:33

## 2018-01-01 RX ADMIN — METHENAMINE HIPPURATE 1 G: 1 TABLET ORAL at 16:19

## 2018-01-01 RX ADMIN — IPRATROPIUM BROMIDE AND ALBUTEROL SULFATE 1 AMPULE: .5; 3 SOLUTION RESPIRATORY (INHALATION) at 16:17

## 2018-01-01 RX ADMIN — METHENAMINE HIPPURATE 1 G: 1 TABLET ORAL at 08:15

## 2018-01-01 RX ADMIN — IPRATROPIUM BROMIDE AND ALBUTEROL SULFATE 1 AMPULE: .5; 3 SOLUTION RESPIRATORY (INHALATION) at 21:23

## 2018-01-01 RX ADMIN — Medication 2 PUFF: at 08:26

## 2018-01-01 RX ADMIN — METHENAMINE HIPPURATE 1 G: 1 TABLET ORAL at 08:33

## 2018-01-01 RX ADMIN — PANTOPRAZOLE SODIUM 40 MG: 40 TABLET, DELAYED RELEASE ORAL at 07:55

## 2018-01-01 RX ADMIN — METOPROLOL TARTRATE 100 MG: 100 TABLET, FILM COATED ORAL at 21:26

## 2018-01-01 RX ADMIN — PANTOPRAZOLE SODIUM 40 MG: 40 TABLET, DELAYED RELEASE ORAL at 08:32

## 2018-01-01 RX ADMIN — METHENAMINE HIPPURATE 1 G: 1 TABLET ORAL at 20:40

## 2018-01-01 RX ADMIN — CLOPIDOGREL 75 MG: 75 TABLET, FILM COATED ORAL at 08:57

## 2018-01-01 RX ADMIN — DEXTROSE MONOHYDRATE 12.5 G: 500 INJECTION PARENTERAL at 06:39

## 2018-01-01 RX ADMIN — OSELTAMIVIR PHOSPHATE 30 MG: 30 CAPSULE ORAL at 21:06

## 2018-01-01 RX ADMIN — ASPIRIN 81 MG: 81 TABLET ORAL at 10:25

## 2018-01-01 RX ADMIN — IPRATROPIUM BROMIDE AND ALBUTEROL SULFATE 1 AMPULE: .5; 3 SOLUTION RESPIRATORY (INHALATION) at 12:42

## 2018-01-01 RX ADMIN — ATORVASTATIN CALCIUM 80 MG: 80 TABLET, FILM COATED ORAL at 09:22

## 2018-01-01 RX ADMIN — OSELTAMIVIR PHOSPHATE 30 MG: 30 CAPSULE ORAL at 08:47

## 2018-01-01 RX ADMIN — TAMSULOSIN HYDROCHLORIDE 0.8 MG: 0.4 CAPSULE ORAL at 09:00

## 2018-01-01 RX ADMIN — SODIUM CHLORIDE: 9 INJECTION, SOLUTION INTRAVENOUS at 23:58

## 2018-01-01 RX ADMIN — INSULIN LISPRO 4 UNITS: 100 INJECTION, SOLUTION INTRAVENOUS; SUBCUTANEOUS at 08:17

## 2018-01-01 RX ADMIN — SODIUM CHLORIDE: 9 INJECTION, SOLUTION INTRAVENOUS at 16:53

## 2018-01-01 RX ADMIN — Medication 2 UNITS: at 08:10

## 2018-01-01 RX ADMIN — DOXAZOSIN 1 MG: 1 TABLET ORAL at 08:59

## 2018-01-01 RX ADMIN — SULFACETAMIDE SODIUM 2 DROP: 100 SOLUTION OPHTHALMIC at 05:47

## 2018-01-01 RX ADMIN — CLOPIDOGREL BISULFATE 75 MG: 75 TABLET ORAL at 20:40

## 2018-01-01 RX ADMIN — Medication 4 UNITS: at 17:07

## 2018-01-01 RX ADMIN — HYDRALAZINE HYDROCHLORIDE 10 MG: 10 TABLET, FILM COATED ORAL at 18:40

## 2018-01-01 RX ADMIN — ASPIRIN 81 MG: 81 TABLET, COATED ORAL at 07:55

## 2018-01-01 RX ADMIN — FAMOTIDINE 20 MG: 20 TABLET ORAL at 08:54

## 2018-01-01 RX ADMIN — AMIODARONE HYDROCHLORIDE 200 MG: 200 TABLET ORAL at 08:16

## 2018-01-01 RX ADMIN — IPRATROPIUM BROMIDE AND ALBUTEROL SULFATE 1 AMPULE: .5; 3 SOLUTION RESPIRATORY (INHALATION) at 11:49

## 2018-01-01 RX ADMIN — DOCUSATE SODIUM 100 MG: 100 CAPSULE, LIQUID FILLED ORAL at 21:36

## 2018-01-01 RX ADMIN — SULFACETAMIDE SODIUM 2 DROP: 100 SOLUTION OPHTHALMIC at 18:03

## 2018-01-01 RX ADMIN — METOPROLOL TARTRATE 100 MG: 100 TABLET, FILM COATED ORAL at 07:36

## 2018-01-01 RX ADMIN — AMIODARONE HYDROCHLORIDE 200 MG: 200 TABLET ORAL at 07:55

## 2018-01-01 RX ADMIN — Medication 2 PUFF: at 21:29

## 2018-01-01 RX ADMIN — IPRATROPIUM BROMIDE AND ALBUTEROL SULFATE 1 AMPULE: .5; 3 SOLUTION RESPIRATORY (INHALATION) at 15:33

## 2018-01-01 RX ADMIN — CLOPIDOGREL BISULFATE 75 MG: 75 TABLET ORAL at 09:00

## 2018-01-01 RX ADMIN — IPRATROPIUM BROMIDE AND ALBUTEROL SULFATE 1 AMPULE: .5; 3 SOLUTION RESPIRATORY (INHALATION) at 20:00

## 2018-01-01 RX ADMIN — INSULIN LISPRO 4 UNITS: 100 INJECTION, SOLUTION INTRAVENOUS; SUBCUTANEOUS at 16:42

## 2018-01-01 RX ADMIN — ATORVASTATIN CALCIUM 80 MG: 80 TABLET, FILM COATED ORAL at 08:46

## 2018-01-01 RX ADMIN — ASPIRIN 81 MG: 81 TABLET, COATED ORAL at 09:11

## 2018-01-01 RX ADMIN — HYDRALAZINE HYDROCHLORIDE 10 MG: 10 TABLET, FILM COATED ORAL at 17:49

## 2018-01-01 RX ADMIN — LEVOFLOXACIN 500 MG: 5 INJECTION, SOLUTION INTRAVENOUS at 14:47

## 2018-01-01 RX ADMIN — AMIODARONE HYDROCHLORIDE 200 MG: 200 TABLET ORAL at 09:22

## 2018-01-01 RX ADMIN — ENOXAPARIN SODIUM 40 MG: 40 INJECTION SUBCUTANEOUS at 20:59

## 2018-01-01 RX ADMIN — HYDRALAZINE HYDROCHLORIDE 10 MG: 10 TABLET, FILM COATED ORAL at 05:14

## 2018-01-01 RX ADMIN — ASPIRIN 81 MG: 81 TABLET ORAL at 22:07

## 2018-01-01 RX ADMIN — Medication 2 PUFF: at 08:06

## 2018-01-01 RX ADMIN — METOPROLOL TARTRATE 100 MG: 100 TABLET, FILM COATED ORAL at 08:37

## 2018-01-01 RX ADMIN — INSULIN GLARGINE 12 UNITS: 100 INJECTION, SOLUTION SUBCUTANEOUS at 21:29

## 2018-01-01 RX ADMIN — FUROSEMIDE 60 MG: 10 INJECTION, SOLUTION INTRAMUSCULAR; INTRAVENOUS at 16:42

## 2018-01-01 RX ADMIN — METOPROLOL TARTRATE 100 MG: 50 TABLET, FILM COATED ORAL at 20:49

## 2018-01-01 RX ADMIN — METOPROLOL TARTRATE 100 MG: 100 TABLET, FILM COATED ORAL at 20:25

## 2018-01-01 RX ADMIN — HYDRALAZINE HYDROCHLORIDE 10 MG: 10 TABLET, FILM COATED ORAL at 04:08

## 2018-01-01 RX ADMIN — METHENAMINE HIPPURATE 1 G: 1 TABLET ORAL at 09:12

## 2018-01-01 RX ADMIN — ENOXAPARIN SODIUM 40 MG: 40 INJECTION SUBCUTANEOUS at 17:09

## 2018-01-01 RX ADMIN — RIFAXIMIN 550 MG: 550 TABLET ORAL at 09:52

## 2018-01-01 RX ADMIN — IPRATROPIUM BROMIDE AND ALBUTEROL SULFATE 1 AMPULE: .5; 3 SOLUTION RESPIRATORY (INHALATION) at 14:45

## 2018-01-01 RX ADMIN — ALBUMIN (HUMAN) 12.5 G: 0.25 INJECTION, SOLUTION INTRAVENOUS at 23:26

## 2018-01-01 RX ADMIN — INSULIN LISPRO 4 UNITS: 100 INJECTION, SOLUTION INTRAVENOUS; SUBCUTANEOUS at 17:38

## 2018-01-01 RX ADMIN — FAMOTIDINE 20 MG: 20 TABLET ORAL at 09:33

## 2018-01-01 RX ADMIN — IPRATROPIUM BROMIDE AND ALBUTEROL SULFATE 1 AMPULE: .5; 3 SOLUTION RESPIRATORY (INHALATION) at 12:40

## 2018-01-01 RX ADMIN — HEPARIN SODIUM 5000 UNITS: 5000 INJECTION, SOLUTION INTRAVENOUS; SUBCUTANEOUS at 21:26

## 2018-01-01 RX ADMIN — ENOXAPARIN SODIUM 40 MG: 40 INJECTION SUBCUTANEOUS at 16:52

## 2018-01-01 RX ADMIN — IPRATROPIUM BROMIDE AND ALBUTEROL SULFATE 1 AMPULE: .5; 3 SOLUTION RESPIRATORY (INHALATION) at 19:56

## 2018-01-01 RX ADMIN — SODIUM CHLORIDE: 9 INJECTION, SOLUTION INTRAVENOUS at 09:02

## 2018-01-01 RX ADMIN — AMIODARONE HYDROCHLORIDE 200 MG: 200 TABLET ORAL at 11:30

## 2018-01-01 RX ADMIN — METOPROLOL TARTRATE 100 MG: 50 TABLET, FILM COATED ORAL at 21:27

## 2018-01-01 RX ADMIN — ACETAMINOPHEN 650 MG: 325 TABLET ORAL at 16:54

## 2018-01-01 RX ADMIN — PREDNISONE 40 MG: 20 TABLET ORAL at 10:25

## 2018-01-01 RX ADMIN — METHENAMINE HIPPURATE 1 G: 1 TABLET ORAL at 20:50

## 2018-01-01 RX ADMIN — FAMOTIDINE 20 MG: 20 TABLET ORAL at 08:32

## 2018-01-01 RX ADMIN — ATORVASTATIN CALCIUM 80 MG: 80 TABLET, FILM COATED ORAL at 09:00

## 2018-01-01 RX ADMIN — INSULIN GLARGINE 12 UNITS: 100 INJECTION, SOLUTION SUBCUTANEOUS at 20:26

## 2018-01-01 RX ADMIN — SENNA 8.6 MG: 8.6 TABLET, COATED ORAL at 09:23

## 2018-01-01 RX ADMIN — SODIUM CHLORIDE: 9 INJECTION, SOLUTION INTRAVENOUS at 15:34

## 2018-01-01 RX ADMIN — ATORVASTATIN CALCIUM 80 MG: 80 TABLET, FILM COATED ORAL at 09:38

## 2018-01-01 RX ADMIN — SULFACETAMIDE SODIUM 2 DROP: 100 SOLUTION OPHTHALMIC at 21:07

## 2018-01-01 RX ADMIN — CLOPIDOGREL 75 MG: 75 TABLET, FILM COATED ORAL at 10:28

## 2018-01-01 RX ADMIN — PRIMIDONE 50 MG: 50 TABLET ORAL at 20:48

## 2018-01-01 RX ADMIN — DOXAZOSIN 4 MG: 4 TABLET ORAL at 22:06

## 2018-01-01 RX ADMIN — METHENAMINE HIPPURATE 1 G: 1 TABLET ORAL at 09:42

## 2018-01-01 RX ADMIN — SULFACETAMIDE SODIUM 2 DROP: 100 SOLUTION OPHTHALMIC at 12:20

## 2018-01-01 RX ADMIN — TAMSULOSIN HYDROCHLORIDE 0.8 MG: 0.4 CAPSULE ORAL at 08:58

## 2018-01-01 RX ADMIN — Medication 2 PUFF: at 08:32

## 2018-01-01 RX ADMIN — Medication 10 UNITS: at 16:42

## 2018-01-01 RX ADMIN — Medication 2 PUFF: at 08:13

## 2018-01-01 RX ADMIN — ATORVASTATIN CALCIUM 80 MG: 80 TABLET, FILM COATED ORAL at 08:32

## 2018-01-01 RX ADMIN — MORPHINE SULFATE 4 MG: 4 INJECTION INTRAVENOUS at 10:09

## 2018-01-01 RX ADMIN — METHENAMINE HIPPURATE 1 G: 1 TABLET ORAL at 08:37

## 2018-01-01 RX ADMIN — IPRATROPIUM BROMIDE AND ALBUTEROL SULFATE 1 AMPULE: .5; 3 SOLUTION RESPIRATORY (INHALATION) at 22:17

## 2018-01-01 RX ADMIN — ALBUTEROL SULFATE 2.5 MG: 2.5 SOLUTION RESPIRATORY (INHALATION) at 09:10

## 2018-01-01 RX ADMIN — METOPROLOL TARTRATE 100 MG: 100 TABLET, FILM COATED ORAL at 09:59

## 2018-01-01 RX ADMIN — METOPROLOL TARTRATE 100 MG: 50 TABLET, FILM COATED ORAL at 08:34

## 2018-01-01 RX ADMIN — IPRATROPIUM BROMIDE AND ALBUTEROL SULFATE 1 AMPULE: .5; 3 SOLUTION RESPIRATORY (INHALATION) at 20:59

## 2018-01-01 RX ADMIN — AMIODARONE HYDROCHLORIDE 200 MG: 200 TABLET ORAL at 08:49

## 2018-01-01 RX ADMIN — METOPROLOL TARTRATE 100 MG: 100 TABLET, FILM COATED ORAL at 09:00

## 2018-01-01 RX ADMIN — METHENAMINE HIPPURATE 1 G: 1 TABLET ORAL at 08:09

## 2018-01-01 RX ADMIN — IPRATROPIUM BROMIDE AND ALBUTEROL SULFATE 1 AMPULE: .5; 3 SOLUTION RESPIRATORY (INHALATION) at 11:37

## 2018-01-01 RX ADMIN — ASPIRIN 81 MG: 81 TABLET, COATED ORAL at 08:34

## 2018-01-01 RX ADMIN — Medication 2 PUFF: at 09:28

## 2018-01-01 RX ADMIN — IPRATROPIUM BROMIDE AND ALBUTEROL SULFATE 1 AMPULE: .5; 3 SOLUTION RESPIRATORY (INHALATION) at 15:55

## 2018-01-01 RX ADMIN — ASPIRIN 81 MG: 81 TABLET, COATED ORAL at 09:23

## 2018-01-01 RX ADMIN — IPRATROPIUM BROMIDE AND ALBUTEROL SULFATE 1 AMPULE: .5; 3 SOLUTION RESPIRATORY (INHALATION) at 09:16

## 2018-01-01 RX ADMIN — HYDRALAZINE HYDROCHLORIDE 10 MG: 10 TABLET, FILM COATED ORAL at 05:24

## 2018-01-01 RX ADMIN — METOPROLOL TARTRATE 100 MG: 100 TABLET, FILM COATED ORAL at 08:57

## 2018-01-01 RX ADMIN — CLOPIDOGREL BISULFATE 75 MG: 75 TABLET ORAL at 08:55

## 2018-01-01 RX ADMIN — TAMSULOSIN HYDROCHLORIDE 0.8 MG: 0.4 CAPSULE ORAL at 08:10

## 2018-01-01 RX ADMIN — METOPROLOL TARTRATE 100 MG: 100 TABLET, FILM COATED ORAL at 08:59

## 2018-01-01 RX ADMIN — IPRATROPIUM BROMIDE AND ALBUTEROL SULFATE 1 AMPULE: .5; 3 SOLUTION RESPIRATORY (INHALATION) at 15:42

## 2018-01-01 RX ADMIN — IPRATROPIUM BROMIDE AND ALBUTEROL SULFATE 1 AMPULE: .5; 3 SOLUTION RESPIRATORY (INHALATION) at 16:08

## 2018-01-01 RX ADMIN — Medication 2 UNITS: at 09:01

## 2018-01-01 RX ADMIN — FUROSEMIDE 40 MG: 10 INJECTION, SOLUTION INTRAMUSCULAR; INTRAVENOUS at 14:40

## 2018-01-01 RX ADMIN — TAMSULOSIN HYDROCHLORIDE 0.4 MG: 0.4 CAPSULE ORAL at 09:59

## 2018-01-01 RX ADMIN — ENOXAPARIN SODIUM 40 MG: 40 INJECTION SUBCUTANEOUS at 18:46

## 2018-01-01 RX ADMIN — SODIUM CHLORIDE: 9 INJECTION, SOLUTION INTRAVENOUS at 15:22

## 2018-01-01 RX ADMIN — Medication 10 ML: at 08:34

## 2018-01-01 RX ADMIN — ASPIRIN 81 MG: 81 TABLET, COATED ORAL at 08:50

## 2018-01-01 RX ADMIN — GUAIFENESIN 600 MG: 600 TABLET, EXTENDED RELEASE ORAL at 10:28

## 2018-01-01 RX ADMIN — FUROSEMIDE 20 MG: 10 INJECTION, SOLUTION INTRAMUSCULAR; INTRAVENOUS at 13:21

## 2018-01-01 RX ADMIN — METOPROLOL TARTRATE 100 MG: 100 TABLET, FILM COATED ORAL at 20:27

## 2018-01-01 RX ADMIN — Medication 5 UNITS: at 21:19

## 2018-01-01 RX ADMIN — INSULIN GLARGINE 10 UNITS: 100 INJECTION, SOLUTION SUBCUTANEOUS at 20:47

## 2018-01-01 RX ADMIN — IPRATROPIUM BROMIDE AND ALBUTEROL SULFATE 1 AMPULE: .5; 3 SOLUTION RESPIRATORY (INHALATION) at 19:50

## 2018-01-01 RX ADMIN — IPRATROPIUM BROMIDE AND ALBUTEROL SULFATE 1 AMPULE: .5; 3 SOLUTION RESPIRATORY (INHALATION) at 08:13

## 2018-01-01 RX ADMIN — Medication 10 UNITS: at 08:49

## 2018-01-01 RX ADMIN — ASPIRIN 81 MG: 81 TABLET ORAL at 07:36

## 2018-01-01 RX ADMIN — TAMSULOSIN HYDROCHLORIDE 0.8 MG: 0.4 CAPSULE ORAL at 09:22

## 2018-01-01 RX ADMIN — Medication 2 UNITS: at 12:17

## 2018-01-01 RX ADMIN — PANTOPRAZOLE SODIUM 40 MG: 40 TABLET, DELAYED RELEASE ORAL at 09:06

## 2018-01-01 RX ADMIN — POTASSIUM CHLORIDE 40 MEQ: 1500 TABLET, EXTENDED RELEASE ORAL at 08:34

## 2018-01-01 RX ADMIN — ENOXAPARIN SODIUM 40 MG: 40 INJECTION SUBCUTANEOUS at 21:23

## 2018-01-01 RX ADMIN — Medication 2 PUFF: at 19:43

## 2018-01-01 RX ADMIN — METOPROLOL TARTRATE 100 MG: 100 TABLET, FILM COATED ORAL at 21:46

## 2018-01-01 RX ADMIN — SODIUM CHLORIDE: 9 INJECTION, SOLUTION INTRAVENOUS at 19:38

## 2018-01-01 RX ADMIN — INSULIN GLARGINE 12 UNITS: 100 INJECTION, SOLUTION SUBCUTANEOUS at 21:25

## 2018-01-01 RX ADMIN — METOLAZONE 5 MG: 5 TABLET ORAL at 08:48

## 2018-01-01 RX ADMIN — TAMSULOSIN HYDROCHLORIDE 0.8 MG: 0.4 CAPSULE ORAL at 08:37

## 2018-01-01 RX ADMIN — AMIODARONE HYDROCHLORIDE 200 MG: 200 TABLET ORAL at 08:48

## 2018-01-01 RX ADMIN — SULFACETAMIDE SODIUM 2 DROP: 100 SOLUTION OPHTHALMIC at 12:53

## 2018-01-01 RX ADMIN — GUAIFENESIN 600 MG: 600 TABLET, EXTENDED RELEASE ORAL at 21:35

## 2018-01-01 RX ADMIN — IPRATROPIUM BROMIDE AND ALBUTEROL SULFATE 1 AMPULE: .5; 3 SOLUTION RESPIRATORY (INHALATION) at 20:02

## 2018-01-01 RX ADMIN — PRIMIDONE 50 MG: 50 TABLET ORAL at 21:27

## 2018-01-01 RX ADMIN — METOPROLOL TARTRATE 100 MG: 100 TABLET, FILM COATED ORAL at 20:39

## 2018-01-01 RX ADMIN — AMIODARONE HYDROCHLORIDE 200 MG: 200 TABLET ORAL at 08:13

## 2018-01-01 RX ADMIN — SULFACETAMIDE SODIUM 2 DROP: 100 SOLUTION OPHTHALMIC at 21:45

## 2018-01-01 RX ADMIN — ONDANSETRON 4 MG: 4 TABLET, ORALLY DISINTEGRATING ORAL at 00:46

## 2018-01-01 RX ADMIN — IPRATROPIUM BROMIDE AND ALBUTEROL SULFATE 1 AMPULE: .5; 3 SOLUTION RESPIRATORY (INHALATION) at 16:07

## 2018-01-01 RX ADMIN — Medication 10 ML: at 08:49

## 2018-01-01 RX ADMIN — AMIODARONE HYDROCHLORIDE 200 MG: 200 TABLET ORAL at 09:42

## 2018-01-01 RX ADMIN — FUROSEMIDE 20 MG: 10 INJECTION, SOLUTION INTRAMUSCULAR; INTRAVENOUS at 10:54

## 2018-01-01 RX ADMIN — HYDRALAZINE HYDROCHLORIDE 10 MG: 10 TABLET, FILM COATED ORAL at 11:52

## 2018-01-01 RX ADMIN — Medication 4 PUFF: at 09:12

## 2018-01-01 RX ADMIN — INSULIN GLARGINE 12 UNITS: 100 INJECTION, SOLUTION SUBCUTANEOUS at 21:00

## 2018-01-01 RX ADMIN — Medication 2 PUFF: at 08:14

## 2018-01-01 RX ADMIN — Medication 2 PUFF: at 20:08

## 2018-01-01 RX ADMIN — Medication 10 ML: at 07:37

## 2018-01-01 RX ADMIN — AMIODARONE HYDROCHLORIDE 200 MG: 200 TABLET ORAL at 08:58

## 2018-01-01 RX ADMIN — IPRATROPIUM BROMIDE AND ALBUTEROL SULFATE 1 AMPULE: .5; 3 SOLUTION RESPIRATORY (INHALATION) at 15:49

## 2018-01-01 RX ADMIN — CLOPIDOGREL 75 MG: 75 TABLET, FILM COATED ORAL at 08:16

## 2018-01-01 RX ADMIN — ALBUMIN (HUMAN) 12.5 G: 0.25 INJECTION, SOLUTION INTRAVENOUS at 03:08

## 2018-01-01 RX ADMIN — IPRATROPIUM BROMIDE AND ALBUTEROL SULFATE 1 AMPULE: .5; 3 SOLUTION RESPIRATORY (INHALATION) at 07:32

## 2018-01-01 RX ADMIN — Medication 2 PUFF: at 22:26

## 2018-01-01 RX ADMIN — ASPIRIN 81 MG: 81 TABLET, COATED ORAL at 09:00

## 2018-01-01 RX ADMIN — METHENAMINE HIPPURATE 1 G: 1 TABLET ORAL at 09:33

## 2018-01-01 RX ADMIN — FUROSEMIDE 60 MG: 10 INJECTION, SOLUTION INTRAMUSCULAR; INTRAVENOUS at 08:34

## 2018-01-01 RX ADMIN — CLOPIDOGREL BISULFATE 75 MG: 75 TABLET ORAL at 09:23

## 2018-01-01 RX ADMIN — ENOXAPARIN SODIUM 40 MG: 40 INJECTION SUBCUTANEOUS at 17:40

## 2018-01-01 RX ADMIN — INSULIN LISPRO 4 UNITS: 100 INJECTION, SOLUTION INTRAVENOUS; SUBCUTANEOUS at 12:18

## 2018-01-01 RX ADMIN — METHENAMINE HIPPURATE 1 G: 1 TABLET ORAL at 07:36

## 2018-01-01 RX ADMIN — METOPROLOL TARTRATE 100 MG: 100 TABLET, FILM COATED ORAL at 08:14

## 2018-01-01 RX ADMIN — ASPIRIN 81 MG CHEWABLE TABLET 81 MG: 81 TABLET CHEWABLE at 14:59

## 2018-01-01 RX ADMIN — IPRATROPIUM BROMIDE AND ALBUTEROL SULFATE 1 AMPULE: .5; 3 SOLUTION RESPIRATORY (INHALATION) at 07:43

## 2018-01-01 RX ADMIN — AMIODARONE HYDROCHLORIDE 200 MG: 200 TABLET ORAL at 09:59

## 2018-01-01 RX ADMIN — ATORVASTATIN CALCIUM 80 MG: 80 TABLET, FILM COATED ORAL at 08:16

## 2018-01-01 RX ADMIN — ATORVASTATIN CALCIUM 80 MG: 80 TABLET, FILM COATED ORAL at 09:33

## 2018-01-01 RX ADMIN — AMIODARONE HYDROCHLORIDE 200 MG: 200 TABLET ORAL at 08:55

## 2018-01-01 RX ADMIN — Medication 2 PUFF: at 19:23

## 2018-01-01 RX ADMIN — Medication 2 UNITS: at 17:39

## 2018-01-01 RX ADMIN — SULFACETAMIDE SODIUM 2 DROP: 100 SOLUTION OPHTHALMIC at 15:56

## 2018-01-01 RX ADMIN — INSULIN GLARGINE 12 UNITS: 100 INJECTION, SOLUTION SUBCUTANEOUS at 20:50

## 2018-01-01 RX ADMIN — ACETAMINOPHEN 650 MG: 325 TABLET ORAL at 09:40

## 2018-01-01 RX ADMIN — SULFACETAMIDE SODIUM 2 DROP: 100 SOLUTION OPHTHALMIC at 11:54

## 2018-01-01 RX ADMIN — Medication 2 PUFF: at 21:04

## 2018-01-01 RX ADMIN — PIPERACILLIN SODIUM,TAZOBACTAM SODIUM 3.38 G: 3; .375 INJECTION, POWDER, FOR SOLUTION INTRAVENOUS at 13:19

## 2018-01-01 RX ADMIN — ALBUTEROL SULFATE 2.5 MG: 2.5 SOLUTION RESPIRATORY (INHALATION) at 15:48

## 2018-01-01 RX ADMIN — INSULIN LISPRO 2 UNITS: 100 INJECTION, SOLUTION INTRAVENOUS; SUBCUTANEOUS at 20:42

## 2018-01-01 RX ADMIN — FAMOTIDINE 20 MG: 20 TABLET ORAL at 09:42

## 2018-01-01 RX ADMIN — METHENAMINE HIPPURATE 1 G: 1 TABLET ORAL at 20:47

## 2018-01-01 RX ADMIN — INSULIN LISPRO 1 UNITS: 100 INJECTION, SOLUTION INTRAVENOUS; SUBCUTANEOUS at 21:29

## 2018-01-01 RX ADMIN — Medication 2 PUFF: at 09:24

## 2018-01-01 RX ADMIN — IPRATROPIUM BROMIDE AND ALBUTEROL SULFATE 1 AMPULE: .5; 3 SOLUTION RESPIRATORY (INHALATION) at 16:04

## 2018-01-01 RX ADMIN — Medication 2 UNITS: at 12:15

## 2018-01-01 RX ADMIN — METOPROLOL TARTRATE 100 MG: 50 TABLET, FILM COATED ORAL at 20:47

## 2018-01-01 RX ADMIN — ONDANSETRON HYDROCHLORIDE 4 MG: 4 TABLET, FILM COATED ORAL at 15:01

## 2018-01-01 RX ADMIN — MORPHINE SULFATE 30 MG: 1 INJECTION INTRAVENOUS at 11:54

## 2018-01-01 RX ADMIN — AMIODARONE HYDROCHLORIDE 200 MG: 200 TABLET ORAL at 14:56

## 2018-01-01 RX ADMIN — PANTOPRAZOLE SODIUM 40 MG: 40 TABLET, DELAYED RELEASE ORAL at 06:04

## 2018-01-01 RX ADMIN — METHENAMINE HIPPURATE 1 G: 1 TABLET ORAL at 17:06

## 2018-01-01 RX ADMIN — Medication 2 PUFF: at 08:24

## 2018-01-01 RX ADMIN — ACETAMINOPHEN 650 MG: 325 TABLET ORAL at 15:40

## 2018-01-01 RX ADMIN — FAMOTIDINE 20 MG: 20 TABLET ORAL at 08:50

## 2018-01-01 RX ADMIN — Medication 10 ML: at 09:22

## 2018-01-01 RX ADMIN — METOPROLOL TARTRATE 100 MG: 100 TABLET, FILM COATED ORAL at 20:38

## 2018-01-01 RX ADMIN — INSULIN LISPRO 4 UNITS: 100 INJECTION, SOLUTION INTRAVENOUS; SUBCUTANEOUS at 09:09

## 2018-01-01 RX ADMIN — ENOXAPARIN SODIUM 40 MG: 40 INJECTION SUBCUTANEOUS at 20:49

## 2018-01-01 RX ADMIN — ALBUTEROL SULFATE 2.5 MG: 2.5 SOLUTION RESPIRATORY (INHALATION) at 13:45

## 2018-01-01 RX ADMIN — ATORVASTATIN CALCIUM 80 MG: 80 TABLET, FILM COATED ORAL at 09:11

## 2018-01-01 RX ADMIN — SULFACETAMIDE SODIUM 2 DROP: 100 SOLUTION OPHTHALMIC at 16:14

## 2018-01-01 RX ADMIN — ENOXAPARIN SODIUM 40 MG: 40 INJECTION SUBCUTANEOUS at 21:44

## 2018-01-01 RX ADMIN — ENOXAPARIN SODIUM 40 MG: 40 INJECTION SUBCUTANEOUS at 21:33

## 2018-01-01 RX ADMIN — SULFACETAMIDE SODIUM 2 DROP: 100 SOLUTION OPHTHALMIC at 16:28

## 2018-01-01 RX ADMIN — IPRATROPIUM BROMIDE AND ALBUTEROL SULFATE 1 AMPULE: .5; 3 SOLUTION RESPIRATORY (INHALATION) at 07:50

## 2018-01-01 RX ADMIN — OSELTAMIVIR PHOSPHATE 30 MG: 30 CAPSULE ORAL at 08:48

## 2018-01-01 RX ADMIN — Medication 2 PUFF: at 20:38

## 2018-01-01 RX ADMIN — ATORVASTATIN CALCIUM 80 MG: 80 TABLET, FILM COATED ORAL at 10:26

## 2018-01-01 RX ADMIN — METOPROLOL TARTRATE 100 MG: 100 TABLET, FILM COATED ORAL at 10:25

## 2018-01-01 RX ADMIN — METOPROLOL TARTRATE 100 MG: 100 TABLET, FILM COATED ORAL at 09:21

## 2018-01-01 RX ADMIN — ATORVASTATIN CALCIUM 80 MG: 80 TABLET, FILM COATED ORAL at 08:57

## 2018-01-01 RX ADMIN — ASPIRIN 81 MG: 81 TABLET, COATED ORAL at 08:37

## 2018-01-01 RX ADMIN — METOPROLOL TARTRATE 100 MG: 50 TABLET, FILM COATED ORAL at 08:16

## 2018-01-01 RX ADMIN — IPRATROPIUM BROMIDE AND ALBUTEROL SULFATE 1 AMPULE: .5; 3 SOLUTION RESPIRATORY (INHALATION) at 07:23

## 2018-01-01 RX ADMIN — METOPROLOL TARTRATE 100 MG: 100 TABLET, FILM COATED ORAL at 22:06

## 2018-01-01 RX ADMIN — INSULIN GLARGINE 12 UNITS: 100 INJECTION, SOLUTION SUBCUTANEOUS at 20:49

## 2018-01-01 RX ADMIN — ALBUTEROL SULFATE 2.5 MG: 2.5 SOLUTION RESPIRATORY (INHALATION) at 08:05

## 2018-01-01 RX ADMIN — ENOXAPARIN SODIUM 40 MG: 40 INJECTION SUBCUTANEOUS at 17:49

## 2018-01-01 RX ADMIN — ENOXAPARIN SODIUM 40 MG: 40 INJECTION SUBCUTANEOUS at 18:22

## 2018-01-01 RX ADMIN — Medication 6 UNITS: at 12:20

## 2018-01-01 RX ADMIN — Medication 4 UNITS: at 16:28

## 2018-01-01 RX ADMIN — SODIUM CHLORIDE: 9 INJECTION, SOLUTION INTRAVENOUS at 10:58

## 2018-01-01 RX ADMIN — FAMOTIDINE 20 MG: 20 TABLET ORAL at 09:38

## 2018-01-01 RX ADMIN — METHENAMINE HIPPURATE 1 G: 1 TABLET ORAL at 21:26

## 2018-01-01 RX ADMIN — ENOXAPARIN SODIUM 40 MG: 40 INJECTION SUBCUTANEOUS at 21:13

## 2018-01-01 RX ADMIN — AMIODARONE HYDROCHLORIDE 200 MG: 200 TABLET ORAL at 08:56

## 2018-01-01 RX ADMIN — METHENAMINE HIPPURATE 1 G: 1 TABLET ORAL at 21:20

## 2018-01-01 RX ADMIN — IPRATROPIUM BROMIDE AND ALBUTEROL SULFATE 1 AMPULE: .5; 3 SOLUTION RESPIRATORY (INHALATION) at 20:35

## 2018-01-01 RX ADMIN — METHENAMINE HIPPURATE 1 G: 1 TABLET ORAL at 09:06

## 2018-01-01 RX ADMIN — INSULIN LISPRO 4 UNITS: 100 INJECTION, SOLUTION INTRAVENOUS; SUBCUTANEOUS at 08:10

## 2018-01-01 RX ADMIN — SULFACETAMIDE SODIUM 2 DROP: 100 SOLUTION OPHTHALMIC at 11:37

## 2018-01-01 RX ADMIN — Medication 10 ML: at 08:13

## 2018-01-01 RX ADMIN — AMIODARONE HYDROCHLORIDE 200 MG: 200 TABLET ORAL at 09:06

## 2018-01-01 RX ADMIN — IPRATROPIUM BROMIDE AND ALBUTEROL SULFATE 1 AMPULE: .5; 3 SOLUTION RESPIRATORY (INHALATION) at 15:19

## 2018-01-01 RX ADMIN — TAMSULOSIN HYDROCHLORIDE 0.8 MG: 0.4 CAPSULE ORAL at 08:32

## 2018-01-01 RX ADMIN — INSULIN GLARGINE 12 UNITS: 100 INJECTION, SOLUTION SUBCUTANEOUS at 22:18

## 2018-01-01 RX ADMIN — AMIODARONE HYDROCHLORIDE 200 MG: 200 TABLET ORAL at 07:36

## 2018-01-01 RX ADMIN — FAMOTIDINE 20 MG: 20 TABLET ORAL at 09:23

## 2018-01-01 RX ADMIN — ASPIRIN 81 MG: 81 TABLET ORAL at 09:22

## 2018-01-01 RX ADMIN — ALBUTEROL SULFATE 2.5 MG: 2.5 SOLUTION RESPIRATORY (INHALATION) at 12:29

## 2018-01-01 RX ADMIN — TAMSULOSIN HYDROCHLORIDE 0.4 MG: 0.4 CAPSULE ORAL at 08:56

## 2018-01-01 RX ADMIN — CLOPIDOGREL BISULFATE 75 MG: 75 TABLET ORAL at 08:38

## 2018-01-01 RX ADMIN — TAMSULOSIN HYDROCHLORIDE 0.4 MG: 0.4 CAPSULE ORAL at 14:34

## 2018-01-01 RX ADMIN — IPRATROPIUM BROMIDE AND ALBUTEROL SULFATE 1 AMPULE: .5; 3 SOLUTION RESPIRATORY (INHALATION) at 13:44

## 2018-01-01 RX ADMIN — METHENAMINE HIPPURATE 1 G: 1 TABLET ORAL at 17:49

## 2018-01-01 RX ADMIN — SULFACETAMIDE SODIUM 2 DROP: 100 SOLUTION OPHTHALMIC at 14:34

## 2018-01-01 RX ADMIN — Medication 10 ML: at 20:26

## 2018-01-01 RX ADMIN — METOPROLOL TARTRATE 100 MG: 100 TABLET, FILM COATED ORAL at 09:22

## 2018-01-01 RX ADMIN — IPRATROPIUM BROMIDE AND ALBUTEROL SULFATE 1 AMPULE: .5; 3 SOLUTION RESPIRATORY (INHALATION) at 10:30

## 2018-01-01 RX ADMIN — Medication 10 ML: at 08:09

## 2018-01-01 RX ADMIN — METHENAMINE HIPPURATE 1 G: 1 TABLET ORAL at 09:23

## 2018-01-01 RX ADMIN — METHENAMINE HIPPURATE 1 G: 1 TABLET ORAL at 20:59

## 2018-01-01 RX ADMIN — HYDRALAZINE HYDROCHLORIDE 10 MG: 10 TABLET, FILM COATED ORAL at 17:06

## 2018-01-01 RX ADMIN — ATORVASTATIN CALCIUM 80 MG: 80 TABLET, FILM COATED ORAL at 22:07

## 2018-01-01 RX ADMIN — METHENAMINE HIPPURATE 1 G: 1 TABLET ORAL at 08:10

## 2018-01-01 RX ADMIN — SULFACETAMIDE SODIUM 2 DROP: 100 SOLUTION OPHTHALMIC at 16:44

## 2018-01-01 RX ADMIN — Medication 2 PUFF: at 07:37

## 2018-01-01 RX ADMIN — SULFACETAMIDE SODIUM 2 DROP: 100 SOLUTION OPHTHALMIC at 09:33

## 2018-01-01 RX ADMIN — Medication 4 UNITS: at 12:38

## 2018-01-01 RX ADMIN — INSULIN GLARGINE 12 UNITS: 100 INJECTION, SOLUTION SUBCUTANEOUS at 20:18

## 2018-01-01 RX ADMIN — HYDRALAZINE HYDROCHLORIDE 10 MG: 10 TABLET, FILM COATED ORAL at 06:21

## 2018-01-01 RX ADMIN — METOPROLOL TARTRATE 100 MG: 100 TABLET, FILM COATED ORAL at 21:06

## 2018-01-01 RX ADMIN — METOPROLOL TARTRATE 100 MG: 50 TABLET, FILM COATED ORAL at 07:55

## 2018-01-01 RX ADMIN — SODIUM CHLORIDE: 9 INJECTION, SOLUTION INTRAVENOUS at 06:29

## 2018-01-01 RX ADMIN — ALBUTEROL SULFATE 2.5 MG: 2.5 SOLUTION RESPIRATORY (INHALATION) at 22:17

## 2018-01-01 RX ADMIN — HEPARIN SODIUM 5000 UNITS: 5000 INJECTION, SOLUTION INTRAVENOUS; SUBCUTANEOUS at 21:29

## 2018-01-01 RX ADMIN — METOPROLOL TARTRATE 100 MG: 100 TABLET, FILM COATED ORAL at 08:12

## 2018-01-01 RX ADMIN — IPRATROPIUM BROMIDE AND ALBUTEROL SULFATE 1 AMPULE: .5; 3 SOLUTION RESPIRATORY (INHALATION) at 20:16

## 2018-01-01 RX ADMIN — CLOPIDOGREL 75 MG: 75 TABLET, FILM COATED ORAL at 08:46

## 2018-01-01 RX ADMIN — FUROSEMIDE 40 MG: 10 INJECTION, SOLUTION INTRAMUSCULAR; INTRAVENOUS at 13:54

## 2018-01-01 RX ADMIN — IPRATROPIUM BROMIDE AND ALBUTEROL SULFATE 1 AMPULE: .5; 3 SOLUTION RESPIRATORY (INHALATION) at 09:27

## 2018-01-01 RX ADMIN — CLOPIDOGREL BISULFATE 75 MG: 75 TABLET ORAL at 09:38

## 2018-01-01 RX ADMIN — AMIODARONE HYDROCHLORIDE 200 MG: 200 TABLET ORAL at 09:33

## 2018-01-01 RX ADMIN — LISINOPRIL 20 MG: 20 TABLET ORAL at 08:32

## 2018-01-01 RX ADMIN — AMIODARONE HYDROCHLORIDE 200 MG: 200 TABLET ORAL at 08:10

## 2018-01-01 RX ADMIN — Medication 10 ML: at 20:40

## 2018-01-01 RX ADMIN — METHENAMINE HIPPURATE 1 G: 1 TABLET ORAL at 21:45

## 2018-01-01 RX ADMIN — IPRATROPIUM BROMIDE AND ALBUTEROL SULFATE 1 AMPULE: .5; 3 SOLUTION RESPIRATORY (INHALATION) at 19:23

## 2018-01-01 RX ADMIN — FAMOTIDINE 20 MG: 20 TABLET ORAL at 09:00

## 2018-01-01 RX ADMIN — ATORVASTATIN CALCIUM 80 MG: 80 TABLET, FILM COATED ORAL at 08:10

## 2018-01-01 RX ADMIN — SULFACETAMIDE SODIUM 2 DROP: 100 SOLUTION OPHTHALMIC at 21:47

## 2018-01-01 RX ADMIN — Medication 10 ML: at 21:36

## 2018-01-01 RX ADMIN — Medication 10 ML: at 20:45

## 2018-01-01 RX ADMIN — METOPROLOL TARTRATE 100 MG: 100 TABLET, FILM COATED ORAL at 21:19

## 2018-01-01 RX ADMIN — Medication 5 MG: at 18:58

## 2018-01-01 RX ADMIN — ALBUTEROL SULFATE 2.5 MG: 2.5 SOLUTION RESPIRATORY (INHALATION) at 21:20

## 2018-01-01 RX ADMIN — CLOPIDOGREL BISULFATE 75 MG: 75 TABLET ORAL at 08:18

## 2018-01-01 RX ADMIN — TAMSULOSIN HYDROCHLORIDE 0.4 MG: 0.4 CAPSULE ORAL at 09:38

## 2018-01-01 RX ADMIN — IPRATROPIUM BROMIDE AND ALBUTEROL SULFATE 1 AMPULE: .5; 3 SOLUTION RESPIRATORY (INHALATION) at 11:46

## 2018-01-01 RX ADMIN — ALBUTEROL SULFATE 2.5 MG: 2.5 SOLUTION RESPIRATORY (INHALATION) at 08:18

## 2018-01-01 RX ADMIN — Medication 2 PUFF: at 08:41

## 2018-01-01 RX ADMIN — IPRATROPIUM BROMIDE AND ALBUTEROL SULFATE 1 AMPULE: .5; 3 SOLUTION RESPIRATORY (INHALATION) at 09:00

## 2018-01-01 RX ADMIN — HYDRALAZINE HYDROCHLORIDE 10 MG: 10 TABLET, FILM COATED ORAL at 22:06

## 2018-01-01 RX ADMIN — ACETAMINOPHEN 650 MG: 325 TABLET ORAL at 16:01

## 2018-01-01 RX ADMIN — AMIODARONE HYDROCHLORIDE 200 MG: 200 TABLET ORAL at 09:11

## 2018-01-01 RX ADMIN — HYDRALAZINE HYDROCHLORIDE 10 MG: 10 TABLET, FILM COATED ORAL at 13:21

## 2018-01-01 RX ADMIN — ATORVASTATIN CALCIUM 80 MG: 80 TABLET, FILM COATED ORAL at 09:42

## 2018-01-01 RX ADMIN — Medication 2 PUFF: at 08:57

## 2018-01-01 RX ADMIN — Medication 2 PUFF: at 20:21

## 2018-01-01 RX ADMIN — SULFACETAMIDE SODIUM 2 DROP: 100 SOLUTION OPHTHALMIC at 14:13

## 2018-01-01 RX ADMIN — METHENAMINE HIPPURATE 1 G: 1 TABLET ORAL at 08:57

## 2018-01-01 RX ADMIN — LISINOPRIL 20 MG: 20 TABLET ORAL at 08:34

## 2018-01-01 RX ADMIN — AMIODARONE HYDROCHLORIDE 200 MG: 200 TABLET ORAL at 10:25

## 2018-01-01 RX ADMIN — ACETAMINOPHEN 650 MG: 325 TABLET ORAL at 20:18

## 2018-01-01 RX ADMIN — Medication 2 PUFF: at 08:05

## 2018-01-01 RX ADMIN — FLUCONAZOLE 200 MG: 200 TABLET ORAL at 08:59

## 2018-01-01 RX ADMIN — ENOXAPARIN SODIUM 40 MG: 40 INJECTION SUBCUTANEOUS at 18:02

## 2018-01-01 RX ADMIN — SULFACETAMIDE SODIUM 2 DROP: 100 SOLUTION OPHTHALMIC at 18:00

## 2018-01-01 RX ADMIN — ASPIRIN 81 MG: 81 TABLET, COATED ORAL at 21:42

## 2018-01-01 RX ADMIN — DOXAZOSIN 1 MG: 1 TABLET ORAL at 08:13

## 2018-01-01 RX ADMIN — Medication 2 PUFF: at 10:16

## 2018-01-01 RX ADMIN — METHENAMINE HIPPURATE 1 G: 1 TABLET ORAL at 20:49

## 2018-01-01 RX ADMIN — CLOPIDOGREL 75 MG: 75 TABLET, FILM COATED ORAL at 08:10

## 2018-01-01 RX ADMIN — METHENAMINE HIPPURATE 1 G: 1 TABLET ORAL at 21:36

## 2018-01-01 RX ADMIN — Medication 2 PUFF: at 21:57

## 2018-01-01 RX ADMIN — AMIODARONE HYDROCHLORIDE 200 MG: 200 TABLET ORAL at 16:52

## 2018-01-01 RX ADMIN — IPRATROPIUM BROMIDE AND ALBUTEROL SULFATE 1 AMPULE: .5; 3 SOLUTION RESPIRATORY (INHALATION) at 11:48

## 2018-01-01 RX ADMIN — Medication 2 PUFF: at 08:28

## 2018-01-01 RX ADMIN — ATORVASTATIN CALCIUM 80 MG: 80 TABLET, FILM COATED ORAL at 16:52

## 2018-01-01 RX ADMIN — IPRATROPIUM BROMIDE AND ALBUTEROL SULFATE 1 AMPULE: .5; 3 SOLUTION RESPIRATORY (INHALATION) at 20:57

## 2018-01-01 RX ADMIN — INSULIN GLARGINE 12 UNITS: 100 INJECTION, SOLUTION SUBCUTANEOUS at 21:30

## 2018-01-01 RX ADMIN — Medication 4 UNITS: at 16:55

## 2018-01-01 RX ADMIN — ATORVASTATIN CALCIUM 80 MG: 80 TABLET, FILM COATED ORAL at 08:12

## 2018-01-01 RX ADMIN — HEPARIN SODIUM 5000 UNITS: 5000 INJECTION, SOLUTION INTRAVENOUS; SUBCUTANEOUS at 20:25

## 2018-01-01 RX ADMIN — ATORVASTATIN CALCIUM 80 MG: 80 TABLET, FILM COATED ORAL at 09:09

## 2018-01-01 RX ADMIN — INSULIN LISPRO 4 UNITS: 100 INJECTION, SOLUTION INTRAVENOUS; SUBCUTANEOUS at 17:50

## 2018-01-01 RX ADMIN — CLOPIDOGREL BISULFATE 75 MG: 75 TABLET ORAL at 11:31

## 2018-01-01 RX ADMIN — SULFACETAMIDE SODIUM 2 DROP: 100 SOLUTION OPHTHALMIC at 08:17

## 2018-01-01 RX ADMIN — TAMSULOSIN HYDROCHLORIDE 0.8 MG: 0.4 CAPSULE ORAL at 14:56

## 2018-01-01 RX ADMIN — INSULIN LISPRO 4 UNITS: 100 INJECTION, SOLUTION INTRAVENOUS; SUBCUTANEOUS at 17:10

## 2018-01-01 RX ADMIN — TAMSULOSIN HYDROCHLORIDE 0.8 MG: 0.4 CAPSULE ORAL at 09:23

## 2018-01-01 RX ADMIN — Medication 10 UNITS: at 08:52

## 2018-01-01 RX ADMIN — METOPROLOL TARTRATE 100 MG: 100 TABLET, FILM COATED ORAL at 21:07

## 2018-01-01 RX ADMIN — INSULIN LISPRO 1 UNITS: 100 INJECTION, SOLUTION INTRAVENOUS; SUBCUTANEOUS at 21:44

## 2018-01-01 RX ADMIN — FUROSEMIDE 60 MG: 10 INJECTION, SOLUTION INTRAMUSCULAR; INTRAVENOUS at 20:26

## 2018-01-01 RX ADMIN — Medication 2 PUFF: at 08:07

## 2018-01-01 RX ADMIN — INSULIN LISPRO 4 UNITS: 100 INJECTION, SOLUTION INTRAVENOUS; SUBCUTANEOUS at 16:57

## 2018-01-01 RX ADMIN — ALBUTEROL SULFATE 2.5 MG: 2.5 SOLUTION RESPIRATORY (INHALATION) at 16:21

## 2018-01-01 RX ADMIN — Medication 2 UNITS: at 16:26

## 2018-01-01 RX ADMIN — METOPROLOL TARTRATE 100 MG: 100 TABLET, FILM COATED ORAL at 08:48

## 2018-01-01 RX ADMIN — ASPIRIN 81 MG: 81 TABLET ORAL at 08:57

## 2018-01-01 RX ADMIN — HYDRALAZINE HYDROCHLORIDE 10 MG: 10 TABLET, FILM COATED ORAL at 13:04

## 2018-01-01 RX ADMIN — Medication 10 ML: at 22:11

## 2018-01-01 RX ADMIN — CLOPIDOGREL BISULFATE 75 MG: 75 TABLET ORAL at 09:11

## 2018-01-01 RX ADMIN — METOPROLOL TARTRATE 100 MG: 100 TABLET, FILM COATED ORAL at 20:18

## 2018-01-01 RX ADMIN — Medication 2 PUFF: at 21:49

## 2018-01-01 RX ADMIN — METOPROLOL TARTRATE 100 MG: 100 TABLET, FILM COATED ORAL at 20:49

## 2018-01-01 RX ADMIN — LISINOPRIL 20 MG: 20 TABLET ORAL at 08:16

## 2018-01-01 RX ADMIN — Medication 2 UNITS: at 17:09

## 2018-01-01 RX ADMIN — ASPIRIN 81 MG: 81 TABLET, COATED ORAL at 08:32

## 2018-01-01 RX ADMIN — METOPROLOL TARTRATE 100 MG: 100 TABLET, FILM COATED ORAL at 08:55

## 2018-01-01 RX ADMIN — Medication 2 PUFF: at 22:35

## 2018-01-01 RX ADMIN — HYDRALAZINE HYDROCHLORIDE 10 MG: 10 TABLET, FILM COATED ORAL at 17:09

## 2018-01-01 RX ADMIN — METHENAMINE HIPPURATE 1 G: 1 TABLET ORAL at 21:47

## 2018-01-01 RX ADMIN — ASPIRIN 81 MG: 81 TABLET, COATED ORAL at 08:59

## 2018-01-01 RX ADMIN — ALBUTEROL SULFATE 2.5 MG: 2.5 SOLUTION RESPIRATORY (INHALATION) at 19:46

## 2018-01-01 RX ADMIN — ENOXAPARIN SODIUM 40 MG: 40 INJECTION SUBCUTANEOUS at 22:06

## 2018-01-01 RX ADMIN — SULFACETAMIDE SODIUM 2 DROP: 100 SOLUTION OPHTHALMIC at 09:42

## 2018-01-01 RX ADMIN — Medication 10 UNITS: at 16:55

## 2018-01-01 RX ADMIN — ASPIRIN 81 MG: 81 TABLET, COATED ORAL at 16:52

## 2018-01-01 RX ADMIN — METHENAMINE HIPPURATE 1 G: 1 TABLET ORAL at 16:55

## 2018-01-01 RX ADMIN — SULFACETAMIDE SODIUM 2 DROP: 100 SOLUTION OPHTHALMIC at 08:51

## 2018-01-01 RX ADMIN — TAMSULOSIN HYDROCHLORIDE 0.8 MG: 0.4 CAPSULE ORAL at 08:15

## 2018-01-01 RX ADMIN — HYDRALAZINE HYDROCHLORIDE 10 MG: 10 TABLET, FILM COATED ORAL at 00:50

## 2018-01-01 RX ADMIN — Medication 2 PUFF: at 22:17

## 2018-01-01 RX ADMIN — Medication 10 ML: at 07:58

## 2018-01-01 RX ADMIN — Medication 10 ML: at 09:09

## 2018-01-01 RX ADMIN — Medication 10 UNITS: at 10:21

## 2018-01-01 RX ADMIN — CLOPIDOGREL BISULFATE 75 MG: 75 TABLET ORAL at 08:16

## 2018-01-01 RX ADMIN — CLOPIDOGREL 75 MG: 75 TABLET, FILM COATED ORAL at 08:32

## 2018-01-01 RX ADMIN — Medication 2 PUFF: at 20:16

## 2018-01-01 RX ADMIN — METOPROLOL TARTRATE 100 MG: 100 TABLET, FILM COATED ORAL at 11:31

## 2018-01-01 RX ADMIN — ASPIRIN 81 MG: 81 TABLET, COATED ORAL at 09:33

## 2018-01-01 RX ADMIN — IPRATROPIUM BROMIDE AND ALBUTEROL SULFATE 1 AMPULE: .5; 3 SOLUTION RESPIRATORY (INHALATION) at 16:14

## 2018-01-01 RX ADMIN — Medication 10 ML: at 09:00

## 2018-01-01 RX ADMIN — METHENAMINE HIPPURATE 1 G: 1 TABLET ORAL at 21:18

## 2018-01-01 RX ADMIN — ASPIRIN 81 MG: 81 TABLET, COATED ORAL at 09:22

## 2018-01-01 RX ADMIN — METHENAMINE HIPPURATE 1 G: 1 TABLET ORAL at 21:56

## 2018-01-01 RX ADMIN — PREDNISONE 40 MG: 20 TABLET ORAL at 16:42

## 2018-01-01 RX ADMIN — IPRATROPIUM BROMIDE AND ALBUTEROL SULFATE 1 AMPULE: .5; 3 SOLUTION RESPIRATORY (INHALATION) at 22:24

## 2018-01-01 RX ADMIN — ALBUTEROL SULFATE 2.5 MG: 2.5 SOLUTION RESPIRATORY (INHALATION) at 16:38

## 2018-01-01 RX ADMIN — IPRATROPIUM BROMIDE AND ALBUTEROL SULFATE 1 AMPULE: .5; 3 SOLUTION RESPIRATORY (INHALATION) at 20:19

## 2018-01-01 RX ADMIN — ALBUTEROL SULFATE 2.5 MG: 2.5 SOLUTION RESPIRATORY (INHALATION) at 21:21

## 2018-01-01 RX ADMIN — Medication 10 ML: at 00:20

## 2018-01-01 RX ADMIN — Medication 10 UNITS: at 16:45

## 2018-01-01 RX ADMIN — Medication 4 PUFF: at 09:52

## 2018-01-01 RX ADMIN — METOPROLOL TARTRATE 100 MG: 100 TABLET, FILM COATED ORAL at 09:11

## 2018-01-01 RX ADMIN — INSULIN GLARGINE 12 UNITS: 100 INJECTION, SOLUTION SUBCUTANEOUS at 21:57

## 2018-01-01 RX ADMIN — IPRATROPIUM BROMIDE AND ALBUTEROL SULFATE 1 AMPULE: .5; 3 SOLUTION RESPIRATORY (INHALATION) at 07:47

## 2018-01-01 RX ADMIN — Medication 2 UNITS: at 21:15

## 2018-01-01 RX ADMIN — PIPERACILLIN SODIUM AND TAZOBACTAM SODIUM 3.38 G: 3; .375 INJECTION, POWDER, LYOPHILIZED, FOR SOLUTION INTRAVENOUS at 06:40

## 2018-01-01 RX ADMIN — METHENAMINE HIPPURATE 1 G: 1 TABLET ORAL at 22:18

## 2018-01-01 RX ADMIN — SULFACETAMIDE SODIUM 2 DROP: 100 SOLUTION OPHTHALMIC at 17:46

## 2018-01-01 RX ADMIN — SULFACETAMIDE SODIUM 2 DROP: 100 SOLUTION OPHTHALMIC at 14:40

## 2018-01-01 RX ADMIN — ATORVASTATIN CALCIUM 80 MG: 80 TABLET, FILM COATED ORAL at 08:34

## 2018-01-01 RX ADMIN — MORPHINE SULFATE 2 MG: 2 INJECTION, SOLUTION INTRAMUSCULAR; INTRAVENOUS at 00:05

## 2018-01-01 RX ADMIN — Medication 2 UNITS: at 16:36

## 2018-01-01 RX ADMIN — IPRATROPIUM BROMIDE AND ALBUTEROL SULFATE 1 AMPULE: .5; 3 SOLUTION RESPIRATORY (INHALATION) at 13:19

## 2018-01-01 RX ADMIN — HYDRALAZINE HYDROCHLORIDE 10 MG: 10 TABLET, FILM COATED ORAL at 17:40

## 2018-01-01 RX ADMIN — Medication 2 PUFF: at 20:00

## 2018-01-01 RX ADMIN — CLOPIDOGREL 75 MG: 75 TABLET, FILM COATED ORAL at 21:42

## 2018-01-01 RX ADMIN — METHENAMINE HIPPURATE 1 G: 1 TABLET ORAL at 08:59

## 2018-01-01 RX ADMIN — METHENAMINE HIPPURATE 1 G: 1 TABLET ORAL at 20:26

## 2018-01-01 RX ADMIN — METOPROLOL TARTRATE 100 MG: 100 TABLET, FILM COATED ORAL at 22:18

## 2018-01-01 RX ADMIN — SENNA 8.6 MG: 8.6 TABLET, COATED ORAL at 21:56

## 2018-01-01 RX ADMIN — Medication 10 ML: at 20:39

## 2018-01-01 RX ADMIN — METOPROLOL TARTRATE 100 MG: 100 TABLET, FILM COATED ORAL at 07:57

## 2018-01-01 RX ADMIN — IPRATROPIUM BROMIDE AND ALBUTEROL SULFATE 1 AMPULE: .5; 3 SOLUTION RESPIRATORY (INHALATION) at 19:58

## 2018-01-01 RX ADMIN — ASPIRIN 81 MG: 81 TABLET, COATED ORAL at 08:10

## 2018-01-01 RX ADMIN — FUROSEMIDE 60 MG: 10 INJECTION, SOLUTION INTRAMUSCULAR; INTRAVENOUS at 20:47

## 2018-01-01 RX ADMIN — CLOPIDOGREL 75 MG: 75 TABLET, FILM COATED ORAL at 07:36

## 2018-01-01 RX ADMIN — Medication 10 ML: at 08:52

## 2018-01-01 RX ADMIN — CLOPIDOGREL BISULFATE 75 MG: 75 TABLET ORAL at 14:59

## 2018-01-01 RX ADMIN — CLOPIDOGREL BISULFATE 75 MG: 75 TABLET ORAL at 08:59

## 2018-01-01 RX ADMIN — PANTOPRAZOLE SODIUM 40 MG: 40 TABLET, DELAYED RELEASE ORAL at 08:09

## 2018-01-01 RX ADMIN — Medication 4 PUFF: at 09:55

## 2018-01-01 RX ADMIN — FAMOTIDINE 20 MG: 20 TABLET ORAL at 08:37

## 2018-01-01 RX ADMIN — AMIODARONE HYDROCHLORIDE 200 MG: 200 TABLET ORAL at 08:15

## 2018-01-01 RX ADMIN — INSULIN LISPRO 2 UNITS: 100 INJECTION, SOLUTION INTRAVENOUS; SUBCUTANEOUS at 22:13

## 2018-01-01 RX ADMIN — OSELTAMIVIR PHOSPHATE 30 MG: 30 CAPSULE ORAL at 22:07

## 2018-01-01 RX ADMIN — HYDRALAZINE HYDROCHLORIDE 10 MG: 10 TABLET, FILM COATED ORAL at 12:19

## 2018-01-01 RX ADMIN — METOPROLOL TARTRATE 100 MG: 100 TABLET, FILM COATED ORAL at 20:35

## 2018-01-01 RX ADMIN — SULFACETAMIDE SODIUM 2 DROP: 100 SOLUTION OPHTHALMIC at 21:13

## 2018-01-01 RX ADMIN — Medication 10 UNITS: at 18:22

## 2018-01-01 RX ADMIN — ALBUTEROL SULFATE 2.5 MG: 2.5 SOLUTION RESPIRATORY (INHALATION) at 17:36

## 2018-01-01 RX ADMIN — ENOXAPARIN SODIUM 40 MG: 40 INJECTION SUBCUTANEOUS at 20:50

## 2018-01-01 RX ADMIN — MORPHINE SULFATE 4 MG: 4 INJECTION INTRAVENOUS at 06:41

## 2018-01-01 RX ADMIN — ACETAMINOPHEN 650 MG: 325 TABLET ORAL at 16:28

## 2018-01-01 RX ADMIN — INSULIN LISPRO 4 UNITS: 100 INJECTION, SOLUTION INTRAVENOUS; SUBCUTANEOUS at 12:20

## 2018-01-01 RX ADMIN — METOPROLOL TARTRATE 100 MG: 100 TABLET, FILM COATED ORAL at 21:36

## 2018-01-01 RX ADMIN — Medication 2 UNITS: at 13:06

## 2018-01-01 RX ADMIN — OSELTAMIVIR PHOSPHATE 30 MG: 30 CAPSULE ORAL at 07:36

## 2018-01-01 RX ADMIN — HYDRALAZINE HYDROCHLORIDE 10 MG: 10 TABLET, FILM COATED ORAL at 06:22

## 2018-01-01 RX ADMIN — TAMSULOSIN HYDROCHLORIDE 0.8 MG: 0.4 CAPSULE ORAL at 09:42

## 2018-01-01 RX ADMIN — ASPIRIN 81 MG: 81 TABLET ORAL at 08:47

## 2018-01-01 RX ADMIN — ENOXAPARIN SODIUM 40 MG: 40 INJECTION SUBCUTANEOUS at 21:46

## 2018-01-01 RX ADMIN — CLOPIDOGREL BISULFATE 75 MG: 75 TABLET ORAL at 09:42

## 2018-01-01 RX ADMIN — INSULIN LISPRO 4 UNITS: 100 INJECTION, SOLUTION INTRAVENOUS; SUBCUTANEOUS at 07:59

## 2018-01-01 RX ADMIN — Medication 10 UNITS: at 10:45

## 2018-01-01 RX ADMIN — GUAIFENESIN 600 MG: 600 TABLET, EXTENDED RELEASE ORAL at 14:01

## 2018-01-01 RX ADMIN — OSELTAMIVIR PHOSPHATE 30 MG: 30 CAPSULE ORAL at 22:10

## 2018-01-01 RX ADMIN — ATORVASTATIN CALCIUM 80 MG: 80 TABLET, FILM COATED ORAL at 08:14

## 2018-01-01 RX ADMIN — METOLAZONE 5 MG: 5 TABLET ORAL at 22:18

## 2018-01-01 RX ADMIN — CEFTRIAXONE SODIUM 1 G: 1 INJECTION, POWDER, FOR SOLUTION INTRAMUSCULAR; INTRAVENOUS at 03:24

## 2018-01-01 RX ADMIN — METOPROLOL TARTRATE 100 MG: 100 TABLET, FILM COATED ORAL at 16:51

## 2018-01-01 RX ADMIN — INSULIN LISPRO 13 UNITS: 100 INJECTION, SUSPENSION SUBCUTANEOUS at 09:41

## 2018-01-01 RX ADMIN — SULFACETAMIDE SODIUM 2 DROP: 100 SOLUTION OPHTHALMIC at 14:00

## 2018-01-01 RX ADMIN — AMIODARONE HYDROCHLORIDE 200 MG: 200 TABLET ORAL at 08:08

## 2018-01-01 RX ADMIN — METOPROLOL TARTRATE 100 MG: 50 TABLET, FILM COATED ORAL at 21:42

## 2018-01-01 RX ADMIN — CLOPIDOGREL 75 MG: 75 TABLET, FILM COATED ORAL at 09:22

## 2018-01-01 RX ADMIN — METHYLPREDNISOLONE SODIUM SUCCINATE 125 MG: 125 INJECTION, POWDER, FOR SOLUTION INTRAMUSCULAR; INTRAVENOUS at 11:26

## 2018-01-01 RX ADMIN — METHENAMINE HIPPURATE 1 G: 1 TABLET ORAL at 08:45

## 2018-01-01 RX ADMIN — IPRATROPIUM BROMIDE AND ALBUTEROL SULFATE 1 AMPULE: .5; 3 SOLUTION RESPIRATORY (INHALATION) at 22:27

## 2018-01-01 RX ADMIN — HYDRALAZINE HYDROCHLORIDE 10 MG: 10 TABLET, FILM COATED ORAL at 23:47

## 2018-01-01 RX ADMIN — ENOXAPARIN SODIUM 40 MG: 40 INJECTION SUBCUTANEOUS at 20:18

## 2018-01-01 RX ADMIN — METOPROLOL TARTRATE 100 MG: 100 TABLET, FILM COATED ORAL at 08:56

## 2018-01-01 RX ADMIN — METOPROLOL TARTRATE 100 MG: 50 TABLET, FILM COATED ORAL at 08:31

## 2018-01-01 RX ADMIN — Medication 2 UNITS: at 08:14

## 2018-01-01 RX ADMIN — INSULIN LISPRO 4 UNITS: 100 INJECTION, SOLUTION INTRAVENOUS; SUBCUTANEOUS at 13:08

## 2018-01-01 RX ADMIN — DOXAZOSIN 4 MG: 4 TABLET ORAL at 21:42

## 2018-01-01 RX ADMIN — SULFACETAMIDE SODIUM 2 DROP: 100 SOLUTION OPHTHALMIC at 11:16

## 2018-01-01 RX ADMIN — ALBUTEROL SULFATE 2.5 MG: 2.5 SOLUTION RESPIRATORY (INHALATION) at 17:32

## 2018-01-01 RX ADMIN — Medication 2 UNITS: at 12:48

## 2018-01-01 RX ADMIN — ASPIRIN 81 MG: 81 TABLET, COATED ORAL at 08:55

## 2018-01-01 RX ADMIN — INSULIN GLARGINE 12 UNITS: 100 INJECTION, SOLUTION SUBCUTANEOUS at 21:44

## 2018-01-01 RX ADMIN — INSULIN LISPRO 1 UNITS: 100 INJECTION, SOLUTION INTRAVENOUS; SUBCUTANEOUS at 20:26

## 2018-01-01 RX ADMIN — ATORVASTATIN CALCIUM 80 MG: 80 TABLET, FILM COATED ORAL at 08:59

## 2018-01-01 RX ADMIN — METOPROLOL TARTRATE 100 MG: 100 TABLET, FILM COATED ORAL at 14:57

## 2018-01-01 RX ADMIN — PANTOPRAZOLE SODIUM 40 MG: 40 TABLET, DELAYED RELEASE ORAL at 08:34

## 2018-01-01 RX ADMIN — CLOPIDOGREL BISULFATE 75 MG: 75 TABLET ORAL at 08:32

## 2018-01-01 RX ADMIN — AMIODARONE HYDROCHLORIDE 200 MG: 200 TABLET ORAL at 08:14

## 2018-01-01 RX ADMIN — INSULIN GLARGINE 12 UNITS: 100 INJECTION, SOLUTION SUBCUTANEOUS at 21:26

## 2018-01-01 RX ADMIN — FUROSEMIDE 40 MG: 10 INJECTION, SOLUTION INTRAMUSCULAR; INTRAVENOUS at 08:32

## 2018-01-01 RX ADMIN — METHENAMINE HIPPURATE 1 G: 1 TABLET ORAL at 09:00

## 2018-01-01 RX ADMIN — IPRATROPIUM BROMIDE AND ALBUTEROL SULFATE 1 AMPULE: .5; 3 SOLUTION RESPIRATORY (INHALATION) at 15:51

## 2018-01-01 RX ADMIN — Medication 2 PUFF: at 07:30

## 2018-01-01 RX ADMIN — INSULIN LISPRO 4 UNITS: 100 INJECTION, SOLUTION INTRAVENOUS; SUBCUTANEOUS at 08:46

## 2018-01-01 RX ADMIN — IPRATROPIUM BROMIDE AND ALBUTEROL SULFATE 1 AMPULE: .5; 3 SOLUTION RESPIRATORY (INHALATION) at 08:55

## 2018-01-01 RX ADMIN — IPRATROPIUM BROMIDE AND ALBUTEROL SULFATE 1 AMPULE: .5; 3 SOLUTION RESPIRATORY (INHALATION) at 12:29

## 2018-01-01 RX ADMIN — MULTIPLE VITAMINS W/ MINERALS TAB 1 TABLET: TAB at 09:12

## 2018-01-01 RX ADMIN — IPRATROPIUM BROMIDE AND ALBUTEROL SULFATE 1 AMPULE: .5; 3 SOLUTION RESPIRATORY (INHALATION) at 08:05

## 2018-01-01 RX ADMIN — ALBUMIN (HUMAN) 12.5 G: 0.25 INJECTION, SOLUTION INTRAVENOUS at 07:57

## 2018-01-01 RX ADMIN — Medication 2 PUFF: at 07:43

## 2018-01-01 RX ADMIN — AMIODARONE HYDROCHLORIDE 200 MG: 200 TABLET ORAL at 07:57

## 2018-01-01 RX ADMIN — ALBUTEROL SULFATE 2.5 MG: 2.5 SOLUTION RESPIRATORY (INHALATION) at 08:37

## 2018-01-01 RX ADMIN — PANTOPRAZOLE SODIUM 40 MG: 40 TABLET, DELAYED RELEASE ORAL at 08:10

## 2018-01-01 RX ADMIN — Medication 2 PUFF: at 07:38

## 2018-01-01 RX ADMIN — METHENAMINE HIPPURATE 1 G: 1 TABLET ORAL at 20:18

## 2018-01-01 RX ADMIN — METHENAMINE HIPPURATE 1 G: 1 TABLET ORAL at 20:15

## 2018-01-01 RX ADMIN — Medication 2 PUFF: at 08:47

## 2018-01-01 RX ADMIN — IPRATROPIUM BROMIDE AND ALBUTEROL SULFATE 1 AMPULE: .5; 3 SOLUTION RESPIRATORY (INHALATION) at 21:46

## 2018-01-01 RX ADMIN — Medication 2 PUFF: at 09:37

## 2018-01-01 RX ADMIN — ATORVASTATIN CALCIUM 80 MG: 80 TABLET, FILM COATED ORAL at 07:36

## 2018-01-01 RX ADMIN — IPRATROPIUM BROMIDE AND ALBUTEROL SULFATE 1 AMPULE: .5; 3 SOLUTION RESPIRATORY (INHALATION) at 20:55

## 2018-01-01 RX ADMIN — HYDRALAZINE HYDROCHLORIDE 10 MG: 10 TABLET, FILM COATED ORAL at 12:02

## 2018-01-01 RX ADMIN — FUROSEMIDE 40 MG: 10 INJECTION, SOLUTION INTRAMUSCULAR; INTRAVENOUS at 07:36

## 2018-01-01 RX ADMIN — METOLAZONE 5 MG: 5 TABLET ORAL at 09:22

## 2018-01-01 RX ADMIN — Medication 10 UNITS: at 17:59

## 2018-01-01 RX ADMIN — Medication 10 ML: at 20:48

## 2018-01-01 RX ADMIN — CLOPIDOGREL BISULFATE 75 MG: 75 TABLET ORAL at 08:56

## 2018-01-01 RX ADMIN — DOXAZOSIN 4 MG: 4 TABLET ORAL at 20:26

## 2018-01-01 RX ADMIN — METOPROLOL TARTRATE 100 MG: 50 TABLET, FILM COATED ORAL at 08:10

## 2018-01-01 RX ADMIN — ENOXAPARIN SODIUM 40 MG: 40 INJECTION SUBCUTANEOUS at 22:17

## 2018-01-01 RX ADMIN — METOPROLOL TARTRATE 100 MG: 100 TABLET, FILM COATED ORAL at 08:46

## 2018-01-01 RX ADMIN — METOPROLOL TARTRATE 100 MG: 100 TABLET, FILM COATED ORAL at 20:50

## 2018-01-01 RX ADMIN — FUROSEMIDE 40 MG: 10 INJECTION, SOLUTION INTRAMUSCULAR; INTRAVENOUS at 16:52

## 2018-01-01 RX ADMIN — RIFAXIMIN 550 MG: 550 TABLET ORAL at 08:58

## 2018-01-01 RX ADMIN — Medication 10 ML: at 20:27

## 2018-01-01 RX ADMIN — IPRATROPIUM BROMIDE AND ALBUTEROL SULFATE 1 AMPULE: .5; 3 SOLUTION RESPIRATORY (INHALATION) at 07:48

## 2018-01-01 RX ADMIN — METHENAMINE HIPPURATE 1 G: 1 TABLET ORAL at 20:39

## 2018-01-01 RX ADMIN — ENOXAPARIN SODIUM 40 MG: 40 INJECTION SUBCUTANEOUS at 16:42

## 2018-01-01 RX ADMIN — HYDRALAZINE HYDROCHLORIDE 10 MG: 10 TABLET, FILM COATED ORAL at 12:15

## 2018-01-01 RX ADMIN — PRIMIDONE 50 MG: 50 TABLET ORAL at 20:26

## 2018-01-01 RX ADMIN — METHENAMINE HIPPURATE 1 G: 1 TABLET ORAL at 09:22

## 2018-01-01 RX ADMIN — METOPROLOL TARTRATE 100 MG: 100 TABLET, FILM COATED ORAL at 21:18

## 2018-01-01 RX ADMIN — DOXAZOSIN 4 MG: 4 TABLET ORAL at 20:47

## 2018-01-01 RX ADMIN — PIPERACILLIN SODIUM AND TAZOBACTAM SODIUM 3.38 G: 3; .375 INJECTION, POWDER, LYOPHILIZED, FOR SOLUTION INTRAVENOUS at 18:42

## 2018-01-01 RX ADMIN — Medication 2 UNITS: at 08:15

## 2018-01-01 RX ADMIN — CLOPIDOGREL 75 MG: 75 TABLET, FILM COATED ORAL at 08:34

## 2018-01-01 RX ADMIN — INSULIN LISPRO 1 UNITS: 100 INJECTION, SOLUTION INTRAVENOUS; SUBCUTANEOUS at 21:18

## 2018-01-01 RX ADMIN — SULFACETAMIDE SODIUM 2 DROP: 100 SOLUTION OPHTHALMIC at 21:57

## 2018-01-01 RX ADMIN — INSULIN GLARGINE 12 UNITS: 100 INJECTION, SOLUTION SUBCUTANEOUS at 21:17

## 2018-01-01 RX ADMIN — HYDRALAZINE HYDROCHLORIDE 10 MG: 10 TABLET, FILM COATED ORAL at 12:58

## 2018-01-01 RX ADMIN — Medication 10 ML: at 21:34

## 2018-01-01 RX ADMIN — LISINOPRIL 20 MG: 20 TABLET ORAL at 07:55

## 2018-01-01 RX ADMIN — FAMOTIDINE 20 MG: 20 TABLET ORAL at 08:56

## 2018-01-01 RX ADMIN — IPRATROPIUM BROMIDE AND ALBUTEROL SULFATE 1 AMPULE: .5; 3 SOLUTION RESPIRATORY (INHALATION) at 07:58

## 2018-01-01 RX ADMIN — IPRATROPIUM BROMIDE AND ALBUTEROL SULFATE 1 AMPULE: .5; 3 SOLUTION RESPIRATORY (INHALATION) at 21:54

## 2018-01-01 RX ADMIN — IPRATROPIUM BROMIDE AND ALBUTEROL SULFATE 1 AMPULE: .5; 3 SOLUTION RESPIRATORY (INHALATION) at 15:34

## 2018-01-01 RX ADMIN — CLOPIDOGREL 75 MG: 75 TABLET, FILM COATED ORAL at 07:55

## 2018-01-01 RX ADMIN — METHENAMINE HIPPURATE 1 G: 1 TABLET ORAL at 08:16

## 2018-01-01 RX ADMIN — METHENAMINE HIPPURATE 1 G: 1 TABLET ORAL at 07:55

## 2018-01-01 RX ADMIN — ATORVASTATIN CALCIUM 80 MG: 80 TABLET, FILM COATED ORAL at 20:25

## 2018-01-01 RX ADMIN — DOXAZOSIN 1 MG: 1 TABLET ORAL at 09:11

## 2018-01-01 RX ADMIN — IPRATROPIUM BROMIDE AND ALBUTEROL SULFATE 1 AMPULE: .5; 3 SOLUTION RESPIRATORY (INHALATION) at 08:26

## 2018-01-01 RX ADMIN — Medication 10 ML: at 21:06

## 2018-01-01 RX ADMIN — TAMSULOSIN HYDROCHLORIDE 0.4 MG: 0.4 CAPSULE ORAL at 11:31

## 2018-01-01 RX ADMIN — OSELTAMIVIR PHOSPHATE 30 MG: 30 CAPSULE ORAL at 08:57

## 2018-01-01 RX ADMIN — Medication 2 UNITS: at 17:52

## 2018-01-01 RX ADMIN — Medication 2 PUFF: at 19:56

## 2018-01-01 RX ADMIN — INSULIN GLARGINE 12 UNITS: 100 INJECTION, SOLUTION SUBCUTANEOUS at 21:36

## 2018-01-01 RX ADMIN — METOPROLOL TARTRATE 100 MG: 50 TABLET, FILM COATED ORAL at 20:26

## 2018-01-01 RX ADMIN — METOPROLOL TARTRATE 100 MG: 100 TABLET, FILM COATED ORAL at 09:38

## 2018-01-01 RX ADMIN — Medication 2 PUFF: at 10:01

## 2018-01-01 RX ADMIN — AMIODARONE HYDROCHLORIDE 200 MG: 200 TABLET ORAL at 08:36

## 2018-01-01 RX ADMIN — SODIUM CHLORIDE: 9 INJECTION, SOLUTION INTRAVENOUS at 03:18

## 2018-01-01 RX ADMIN — IPRATROPIUM BROMIDE AND ALBUTEROL SULFATE 1 AMPULE: .5; 3 SOLUTION RESPIRATORY (INHALATION) at 11:52

## 2018-01-01 RX ADMIN — MULTIPLE VITAMINS W/ MINERALS TAB 1 TABLET: TAB at 08:12

## 2018-01-01 RX ADMIN — INSULIN LISPRO 2 UNITS: 100 INJECTION, SOLUTION INTRAVENOUS; SUBCUTANEOUS at 20:40

## 2018-01-01 RX ADMIN — SODIUM CHLORIDE: 9 INJECTION, SOLUTION INTRAVENOUS at 18:35

## 2018-01-01 RX ADMIN — Medication 2 UNITS: at 11:16

## 2018-01-01 RX ADMIN — IPRATROPIUM BROMIDE AND ALBUTEROL SULFATE 1 AMPULE: .5; 3 SOLUTION RESPIRATORY (INHALATION) at 10:01

## 2018-01-01 RX ADMIN — FLUCONAZOLE 200 MG: 200 TABLET ORAL at 10:32

## 2018-01-01 RX ADMIN — METOPROLOL TARTRATE 100 MG: 100 TABLET, FILM COATED ORAL at 08:49

## 2018-01-01 RX ADMIN — DOXAZOSIN 1 MG: 1 TABLET ORAL at 08:12

## 2018-01-01 RX ADMIN — LISINOPRIL 20 MG: 20 TABLET ORAL at 08:08

## 2018-01-01 RX ADMIN — INSULIN GLARGINE 12 UNITS: 100 INJECTION, SOLUTION SUBCUTANEOUS at 21:20

## 2018-01-01 RX ADMIN — METOPROLOL TARTRATE 100 MG: 100 TABLET, FILM COATED ORAL at 20:40

## 2018-01-01 RX ADMIN — INSULIN LISPRO 1 UNITS: 100 INJECTION, SOLUTION INTRAVENOUS; SUBCUTANEOUS at 21:28

## 2018-01-01 RX ADMIN — OSELTAMIVIR PHOSPHATE 30 MG: 30 CAPSULE ORAL at 09:22

## 2018-01-01 RX ADMIN — IPRATROPIUM BROMIDE AND ALBUTEROL SULFATE 1 AMPULE: .5; 3 SOLUTION RESPIRATORY (INHALATION) at 12:20

## 2018-01-01 RX ADMIN — IPRATROPIUM BROMIDE AND ALBUTEROL SULFATE 1 AMPULE: .5; 3 SOLUTION RESPIRATORY (INHALATION) at 21:49

## 2018-01-01 RX ADMIN — IPRATROPIUM BROMIDE AND ALBUTEROL SULFATE 1 AMPULE: .5; 3 SOLUTION RESPIRATORY (INHALATION) at 15:38

## 2018-01-01 RX ADMIN — CEFEPIME HYDROCHLORIDE 2 G: 2 INJECTION, POWDER, FOR SOLUTION INTRAVENOUS at 12:35

## 2018-01-01 RX ADMIN — FUROSEMIDE 40 MG: 10 INJECTION, SOLUTION INTRAMUSCULAR; INTRAVENOUS at 08:47

## 2018-01-01 RX ADMIN — Medication 2 PUFF: at 20:56

## 2018-01-01 RX ADMIN — METOPROLOL TARTRATE 100 MG: 100 TABLET, FILM COATED ORAL at 22:10

## 2018-01-01 RX ADMIN — IPRATROPIUM BROMIDE AND ALBUTEROL SULFATE 1 AMPULE: .5; 3 SOLUTION RESPIRATORY (INHALATION) at 10:16

## 2018-01-01 RX ADMIN — Medication 10 ML: at 20:49

## 2018-01-01 RX ADMIN — OSELTAMIVIR PHOSPHATE 30 MG: 30 CAPSULE ORAL at 20:27

## 2018-01-01 RX ADMIN — Medication 2 UNITS: at 09:12

## 2018-01-01 RX ADMIN — Medication 2 PUFF: at 20:03

## 2018-01-01 RX ADMIN — METOPROLOL TARTRATE 100 MG: 100 TABLET, FILM COATED ORAL at 09:42

## 2018-01-01 RX ADMIN — METOPROLOL TARTRATE 100 MG: 100 TABLET, FILM COATED ORAL at 08:32

## 2018-01-01 RX ADMIN — GUAIFENESIN 600 MG: 600 TABLET, EXTENDED RELEASE ORAL at 08:48

## 2018-01-01 RX ADMIN — IPRATROPIUM BROMIDE AND ALBUTEROL SULFATE 1 AMPULE: .5; 3 SOLUTION RESPIRATORY (INHALATION) at 08:07

## 2018-01-01 RX ADMIN — INSULIN GLARGINE 10 UNITS: 100 INJECTION, SOLUTION SUBCUTANEOUS at 21:23

## 2018-01-01 RX ADMIN — Medication 10 ML: at 07:56

## 2018-01-01 RX ADMIN — ENOXAPARIN SODIUM 40 MG: 40 INJECTION SUBCUTANEOUS at 20:43

## 2018-01-01 RX ADMIN — IPRATROPIUM BROMIDE AND ALBUTEROL SULFATE 1 AMPULE: .5; 3 SOLUTION RESPIRATORY (INHALATION) at 13:31

## 2018-01-01 RX ADMIN — IPRATROPIUM BROMIDE AND ALBUTEROL SULFATE 1 AMPULE: .5; 3 SOLUTION RESPIRATORY (INHALATION) at 19:49

## 2018-01-01 RX ADMIN — METOPROLOL TARTRATE 100 MG: 100 TABLET, FILM COATED ORAL at 20:26

## 2018-01-01 RX ADMIN — MULTIPLE VITAMINS W/ MINERALS TAB 1 TABLET: TAB at 08:14

## 2018-01-01 RX ADMIN — INSULIN LISPRO 4 UNITS: 100 INJECTION, SOLUTION INTRAVENOUS; SUBCUTANEOUS at 12:59

## 2018-01-01 RX ADMIN — Medication 2 PUFF: at 09:10

## 2018-01-01 RX ADMIN — INSULIN GLARGINE 12 UNITS: 100 INJECTION, SOLUTION SUBCUTANEOUS at 21:46

## 2018-01-01 RX ADMIN — Medication 2 PUFF: at 09:00

## 2018-01-01 RX ADMIN — AMIODARONE HYDROCHLORIDE 200 MG: 200 TABLET ORAL at 08:12

## 2018-01-01 RX ADMIN — Medication 10 UNITS: at 09:22

## 2018-01-01 RX ADMIN — IPRATROPIUM BROMIDE AND ALBUTEROL SULFATE 1 AMPULE: .5; 3 SOLUTION RESPIRATORY (INHALATION) at 17:48

## 2018-01-01 RX ADMIN — Medication 10 ML: at 08:14

## 2018-01-01 RX ADMIN — ASPIRIN 81 MG: 81 TABLET, COATED ORAL at 09:59

## 2018-01-01 RX ADMIN — METHENAMINE HIPPURATE 1 G: 1 TABLET ORAL at 21:08

## 2018-01-01 RX ADMIN — TAMSULOSIN HYDROCHLORIDE 0.8 MG: 0.4 CAPSULE ORAL at 08:49

## 2018-01-01 RX ADMIN — ENOXAPARIN SODIUM 40 MG: 40 INJECTION SUBCUTANEOUS at 21:36

## 2018-01-01 RX ADMIN — SODIUM CHLORIDE: 9 INJECTION, SOLUTION INTRAVENOUS at 13:14

## 2018-01-01 RX ADMIN — CLOPIDOGREL BISULFATE 75 MG: 75 TABLET ORAL at 08:50

## 2018-01-01 RX ADMIN — Medication 2 PUFF: at 07:50

## 2018-01-01 RX ADMIN — AMIODARONE HYDROCHLORIDE 200 MG: 200 TABLET ORAL at 08:32

## 2018-01-01 RX ADMIN — AMIODARONE HYDROCHLORIDE 200 MG: 200 TABLET ORAL at 08:59

## 2018-01-01 RX ADMIN — SULFACETAMIDE SODIUM 2 DROP: 100 SOLUTION OPHTHALMIC at 16:40

## 2018-01-01 RX ADMIN — ATORVASTATIN CALCIUM 80 MG: 80 TABLET, FILM COATED ORAL at 09:23

## 2018-01-01 RX ADMIN — METOPROLOL TARTRATE 100 MG: 50 TABLET, FILM COATED ORAL at 08:08

## 2018-01-01 RX ADMIN — CLOPIDOGREL 75 MG: 75 TABLET, FILM COATED ORAL at 08:48

## 2018-01-01 RX ADMIN — IPRATROPIUM BROMIDE AND ALBUTEROL SULFATE 1 AMPULE: .5; 3 SOLUTION RESPIRATORY (INHALATION) at 08:38

## 2018-01-01 RX ADMIN — ALBUTEROL SULFATE 2.5 MG: 2.5 SOLUTION RESPIRATORY (INHALATION) at 11:46

## 2018-01-01 RX ADMIN — Medication 2 PUFF: at 07:48

## 2018-01-01 RX ADMIN — LISINOPRIL 20 MG: 20 TABLET ORAL at 12:43

## 2018-01-01 RX ADMIN — SULFACETAMIDE SODIUM 2 DROP: 100 SOLUTION OPHTHALMIC at 10:00

## 2018-01-01 RX ADMIN — ATORVASTATIN CALCIUM 80 MG: 80 TABLET, FILM COATED ORAL at 08:50

## 2018-01-01 RX ADMIN — GUAIFENESIN 600 MG: 600 TABLET, EXTENDED RELEASE ORAL at 21:06

## 2018-01-01 RX ADMIN — FUROSEMIDE 60 MG: 10 INJECTION, SOLUTION INTRAMUSCULAR; INTRAVENOUS at 10:29

## 2018-01-01 RX ADMIN — INSULIN LISPRO 1 UNITS: 100 INJECTION, SOLUTION INTRAVENOUS; SUBCUTANEOUS at 21:25

## 2018-01-01 RX ADMIN — TAMSULOSIN HYDROCHLORIDE 0.8 MG: 0.4 CAPSULE ORAL at 08:54

## 2018-01-01 RX ADMIN — MULTIPLE VITAMINS W/ MINERALS TAB 1 TABLET: TAB at 16:51

## 2018-01-01 RX ADMIN — METHENAMINE HIPPURATE 1 G: 1 TABLET ORAL at 08:50

## 2018-01-01 RX ADMIN — MORPHINE SULFATE 2 MG: 2 INJECTION, SOLUTION INTRAMUSCULAR; INTRAVENOUS at 20:47

## 2018-01-01 RX ADMIN — AMIODARONE HYDROCHLORIDE 200 MG: 200 TABLET ORAL at 08:34

## 2018-01-01 RX ADMIN — IPRATROPIUM BROMIDE AND ALBUTEROL SULFATE 1 AMPULE: .5; 3 SOLUTION RESPIRATORY (INHALATION) at 18:25

## 2018-01-01 RX ADMIN — ASPIRIN 81 MG: 81 TABLET, COATED ORAL at 09:06

## 2018-01-01 RX ADMIN — METHENAMINE HIPPURATE 1 G: 1 TABLET ORAL at 08:56

## 2018-01-01 RX ADMIN — ASPIRIN 81 MG: 81 TABLET, COATED ORAL at 08:12

## 2018-01-01 RX ADMIN — FUROSEMIDE 60 MG: 10 INJECTION, SOLUTION INTRAMUSCULAR; INTRAVENOUS at 07:55

## 2018-01-01 RX ADMIN — ATORVASTATIN CALCIUM 80 MG: 80 TABLET, FILM COATED ORAL at 21:42

## 2018-01-01 RX ADMIN — METHENAMINE HIPPURATE 1 G: 1 TABLET ORAL at 08:47

## 2018-01-01 RX ADMIN — FAMOTIDINE 20 MG: 20 TABLET ORAL at 08:16

## 2018-01-01 RX ADMIN — AMIODARONE HYDROCHLORIDE 200 MG: 200 TABLET ORAL at 09:37

## 2018-01-01 RX ADMIN — ATORVASTATIN CALCIUM 80 MG: 80 TABLET, FILM COATED ORAL at 08:37

## 2018-01-01 RX ADMIN — IPRATROPIUM BROMIDE AND ALBUTEROL SULFATE 1 AMPULE: .5; 3 SOLUTION RESPIRATORY (INHALATION) at 16:05

## 2018-01-01 RX ADMIN — Medication 5 UNITS: at 09:23

## 2018-01-01 RX ADMIN — SULFACETAMIDE SODIUM 2 DROP: 100 SOLUTION OPHTHALMIC at 12:00

## 2018-01-01 RX ADMIN — Medication 10 ML: at 21:43

## 2018-01-01 RX ADMIN — ALBUTEROL SULFATE 2.5 MG: 2.5 SOLUTION RESPIRATORY (INHALATION) at 13:16

## 2018-01-01 RX ADMIN — ATORVASTATIN CALCIUM 80 MG: 80 TABLET, FILM COATED ORAL at 11:31

## 2018-01-01 RX ADMIN — IPRATROPIUM BROMIDE AND ALBUTEROL SULFATE 1 AMPULE: .5; 3 SOLUTION RESPIRATORY (INHALATION) at 15:26

## 2018-01-01 RX ADMIN — METHENAMINE HIPPURATE 1 G: 1 TABLET ORAL at 08:14

## 2018-01-01 RX ADMIN — INSULIN GLARGINE 12 UNITS: 100 INJECTION, SOLUTION SUBCUTANEOUS at 21:09

## 2018-01-01 RX ADMIN — IPRATROPIUM BROMIDE AND ALBUTEROL SULFATE 1 AMPULE: .5; 3 SOLUTION RESPIRATORY (INHALATION) at 14:12

## 2018-01-01 RX ADMIN — METOPROLOL TARTRATE 100 MG: 100 TABLET, FILM COATED ORAL at 21:45

## 2018-01-01 RX ADMIN — PANTOPRAZOLE SODIUM 40 MG: 40 TABLET, DELAYED RELEASE ORAL at 07:57

## 2018-01-01 RX ADMIN — PRIMIDONE 50 MG: 50 TABLET ORAL at 21:42

## 2018-01-01 RX ADMIN — ATORVASTATIN CALCIUM 80 MG: 80 TABLET, FILM COATED ORAL at 09:59

## 2018-01-01 RX ADMIN — METOPROLOL TARTRATE 100 MG: 100 TABLET, FILM COATED ORAL at 08:15

## 2018-01-01 RX ADMIN — ENOXAPARIN SODIUM 40 MG: 40 INJECTION SUBCUTANEOUS at 20:47

## 2018-01-01 RX ADMIN — VANCOMYCIN HYDROCHLORIDE 1000 MG: 1 INJECTION, POWDER, LYOPHILIZED, FOR SOLUTION INTRAVENOUS at 02:27

## 2018-01-01 RX ADMIN — Medication 2 PUFF: at 20:57

## 2018-01-01 RX ADMIN — IPRATROPIUM BROMIDE AND ALBUTEROL SULFATE 1 AMPULE: .5; 3 SOLUTION RESPIRATORY (INHALATION) at 08:23

## 2018-01-01 RX ADMIN — INSULIN GLARGINE 12 UNITS: 100 INJECTION, SOLUTION SUBCUTANEOUS at 21:43

## 2018-01-01 RX ADMIN — Medication 10 UNITS: at 07:37

## 2018-01-01 RX ADMIN — Medication 2 PUFF: at 07:32

## 2018-01-01 RX ADMIN — ENOXAPARIN SODIUM 40 MG: 40 INJECTION SUBCUTANEOUS at 21:57

## 2018-01-01 RX ADMIN — TETANUS TOXOID, REDUCED DIPHTHERIA TOXOID AND ACELLULAR PERTUSSIS VACCINE, ADSORBED 0.5 ML: 5; 2.5; 8; 8; 2.5 SUSPENSION INTRAMUSCULAR at 16:50

## 2018-01-01 RX ADMIN — ASPIRIN 81 MG: 81 TABLET ORAL at 08:48

## 2018-01-01 RX ADMIN — METHENAMINE HIPPURATE 1 G: 1 TABLET ORAL at 20:36

## 2018-01-01 RX ADMIN — SULFACETAMIDE SODIUM 2 DROP: 100 SOLUTION OPHTHALMIC at 20:00

## 2018-01-01 RX ADMIN — SODIUM CHLORIDE: 9 INJECTION, SOLUTION INTRAVENOUS at 23:36

## 2018-01-01 RX ADMIN — Medication 2 PUFF: at 10:27

## 2018-01-01 RX ADMIN — ATORVASTATIN CALCIUM 80 MG: 80 TABLET, FILM COATED ORAL at 08:54

## 2018-01-01 RX ADMIN — IPRATROPIUM BROMIDE AND ALBUTEROL SULFATE 1 AMPULE: .5; 3 SOLUTION RESPIRATORY (INHALATION) at 13:22

## 2018-01-01 RX ADMIN — Medication 2 PUFF: at 19:50

## 2018-01-01 RX ADMIN — METHENAMINE HIPPURATE 1 G: 1 TABLET ORAL at 10:25

## 2018-01-01 RX ADMIN — METOPROLOL TARTRATE 100 MG: 100 TABLET, FILM COATED ORAL at 09:23

## 2018-01-01 RX ADMIN — METHENAMINE HIPPURATE 1 G: 1 TABLET ORAL at 17:15

## 2018-01-01 RX ADMIN — INSULIN LISPRO 2 UNITS: 100 INJECTION, SOLUTION INTRAVENOUS; SUBCUTANEOUS at 22:03

## 2018-01-01 RX ADMIN — DOXAZOSIN 4 MG: 4 TABLET ORAL at 20:48

## 2018-01-01 RX ADMIN — CLOPIDOGREL 75 MG: 75 TABLET, FILM COATED ORAL at 08:08

## 2018-01-01 RX ADMIN — HYDRALAZINE HYDROCHLORIDE 10 MG: 10 TABLET, FILM COATED ORAL at 18:54

## 2018-01-01 RX ADMIN — IPRATROPIUM BROMIDE AND ALBUTEROL SULFATE 1 AMPULE: .5; 3 SOLUTION RESPIRATORY (INHALATION) at 19:34

## 2018-01-01 RX ADMIN — Medication 10 ML: at 10:25

## 2018-01-01 RX ADMIN — IPRATROPIUM BROMIDE AND ALBUTEROL SULFATE 1 AMPULE: .5; 3 SOLUTION RESPIRATORY (INHALATION) at 19:42

## 2018-01-01 RX ADMIN — IOPAMIDOL 100 ML: 755 INJECTION, SOLUTION INTRAVENOUS at 13:12

## 2018-01-01 RX ADMIN — ATORVASTATIN CALCIUM 80 MG: 80 TABLET, FILM COATED ORAL at 07:55

## 2018-01-01 RX ADMIN — INSULIN LISPRO 1 UNITS: 100 INJECTION, SOLUTION INTRAVENOUS; SUBCUTANEOUS at 21:03

## 2018-01-01 RX ADMIN — INSULIN GLARGINE 12 UNITS: 100 INJECTION, SOLUTION SUBCUTANEOUS at 21:03

## 2018-01-01 RX ADMIN — Medication 10 ML: at 20:47

## 2018-01-01 RX ADMIN — ATORVASTATIN CALCIUM 80 MG: 80 TABLET, FILM COATED ORAL at 08:56

## 2018-01-01 RX ADMIN — Medication 10 ML: at 01:24

## 2018-01-01 RX ADMIN — ALBUMIN (HUMAN) 12.5 G: 0.25 INJECTION, SOLUTION INTRAVENOUS at 17:40

## 2018-01-01 RX ADMIN — IPRATROPIUM BROMIDE AND ALBUTEROL SULFATE 1 AMPULE: .5; 3 SOLUTION RESPIRATORY (INHALATION) at 13:05

## 2018-01-01 RX ADMIN — METHENAMINE HIPPURATE 1 G: 1 TABLET ORAL at 08:32

## 2018-01-01 RX ADMIN — METHENAMINE HIPPURATE 1 G: 1 TABLET ORAL at 08:12

## 2018-01-01 RX ADMIN — Medication 2 UNITS: at 08:44

## 2018-01-01 RX ADMIN — ATORVASTATIN CALCIUM 80 MG: 80 TABLET, FILM COATED ORAL at 08:13

## 2018-01-01 RX ADMIN — METOPROLOL TARTRATE 100 MG: 100 TABLET, FILM COATED ORAL at 09:01

## 2018-01-01 RX ADMIN — GUAIFENESIN 600 MG: 600 TABLET, EXTENDED RELEASE ORAL at 09:22

## 2018-01-01 RX ADMIN — METOPROLOL TARTRATE 100 MG: 100 TABLET, FILM COATED ORAL at 20:47

## 2018-01-01 RX ADMIN — LISINOPRIL 20 MG: 20 TABLET ORAL at 08:10

## 2018-01-01 RX ADMIN — Medication 2 PUFF: at 20:06

## 2018-01-01 RX ADMIN — PRIMIDONE 50 MG: 50 TABLET ORAL at 20:47

## 2018-01-01 RX ADMIN — IPRATROPIUM BROMIDE AND ALBUTEROL SULFATE 1 AMPULE: .5; 3 SOLUTION RESPIRATORY (INHALATION) at 08:41

## 2018-01-01 RX ADMIN — ALBUTEROL SULFATE 2.5 MG: 2.5 SOLUTION RESPIRATORY (INHALATION) at 10:27

## 2018-01-01 RX ADMIN — SULFACETAMIDE SODIUM 2 DROP: 100 SOLUTION OPHTHALMIC at 09:24

## 2018-01-01 RX ADMIN — CLOPIDOGREL 75 MG: 75 TABLET, FILM COATED ORAL at 09:06

## 2018-01-01 RX ADMIN — IPRATROPIUM BROMIDE AND ALBUTEROL SULFATE 1 AMPULE: .5; 3 SOLUTION RESPIRATORY (INHALATION) at 15:24

## 2018-01-01 RX ADMIN — IPRATROPIUM BROMIDE AND ALBUTEROL SULFATE 1 AMPULE: .5; 3 SOLUTION RESPIRATORY (INHALATION) at 12:00

## 2018-01-01 RX ADMIN — HYDRALAZINE HYDROCHLORIDE 10 MG: 10 TABLET, FILM COATED ORAL at 05:09

## 2018-01-01 RX ADMIN — AMIODARONE HYDROCHLORIDE 200 MG: 200 TABLET ORAL at 08:57

## 2018-01-01 RX ADMIN — IPRATROPIUM BROMIDE AND ALBUTEROL SULFATE 1 AMPULE: .5; 3 SOLUTION RESPIRATORY (INHALATION) at 16:55

## 2018-01-01 RX ADMIN — ENOXAPARIN SODIUM 40 MG: 40 INJECTION SUBCUTANEOUS at 17:10

## 2018-01-01 RX ADMIN — ATORVASTATIN CALCIUM 80 MG: 80 TABLET, FILM COATED ORAL at 08:08

## 2018-01-01 RX ADMIN — Medication 4 UNITS: at 11:53

## 2018-01-01 RX ADMIN — MORPHINE SULFATE 2 MG: 2 INJECTION, SOLUTION INTRAMUSCULAR; INTRAVENOUS at 01:25

## 2018-01-01 RX ADMIN — ALBUTEROL SULFATE 2.5 MG: 2.5 SOLUTION RESPIRATORY (INHALATION) at 12:00

## 2018-01-01 RX ADMIN — LISINOPRIL 20 MG: 20 TABLET ORAL at 21:42

## 2018-01-01 RX ADMIN — Medication 2 PUFF: at 21:24

## 2018-01-01 RX ADMIN — GUAIFENESIN 600 MG: 600 TABLET, EXTENDED RELEASE ORAL at 20:27

## 2018-01-01 RX ADMIN — Medication 2 UNITS: at 12:58

## 2018-01-01 RX ADMIN — PRIMIDONE 50 MG: 50 TABLET ORAL at 21:33

## 2018-01-01 RX ADMIN — Medication 2 PUFF: at 19:46

## 2018-01-01 RX ADMIN — IPRATROPIUM BROMIDE AND ALBUTEROL SULFATE 1 AMPULE: .5; 3 SOLUTION RESPIRATORY (INHALATION) at 07:38

## 2018-01-01 RX ADMIN — IPRATROPIUM BROMIDE AND ALBUTEROL SULFATE 1 AMPULE: .5; 3 SOLUTION RESPIRATORY (INHALATION) at 09:23

## 2018-01-01 RX ADMIN — Medication 2 UNITS: at 17:38

## 2018-01-01 ASSESSMENT — PAIN SCALES - GENERAL
PAINLEVEL_OUTOF10: 2
PAINLEVEL_OUTOF10: 0
PAINLEVEL_OUTOF10: 4
PAINLEVEL_OUTOF10: 0
PAINLEVEL_OUTOF10: 3
PAINLEVEL_OUTOF10: 0
PAINLEVEL_OUTOF10: 5
PAINLEVEL_OUTOF10: 0
PAINLEVEL_OUTOF10: 0
PAINLEVEL_OUTOF10: 3
PAINLEVEL_OUTOF10: 5
PAINLEVEL_OUTOF10: 0
PAINLEVEL_OUTOF10: 0
PAINLEVEL_OUTOF10: 3
PAINLEVEL_OUTOF10: 0
PAINLEVEL_OUTOF10: 0
PAINLEVEL_OUTOF10: 4
PAINLEVEL_OUTOF10: 7
PAINLEVEL_OUTOF10: 0
PAINLEVEL_OUTOF10: 5
PAINLEVEL_OUTOF10: 0
PAINLEVEL_OUTOF10: 4
PAINLEVEL_OUTOF10: 0
PAINLEVEL_OUTOF10: 7
PAINLEVEL_OUTOF10: 3
PAINLEVEL_OUTOF10: 3
PAINLEVEL_OUTOF10: 0
PAINLEVEL_OUTOF10: 0
PAINLEVEL_OUTOF10: 5
PAINLEVEL_OUTOF10: 7
PAINLEVEL_OUTOF10: 3
PAINLEVEL_OUTOF10: 0
PAINLEVEL_OUTOF10: 5
PAINLEVEL_OUTOF10: 5
PAINLEVEL_OUTOF10: 0
PAINLEVEL_OUTOF10: 7
PAINLEVEL_OUTOF10: 0
PAINLEVEL_OUTOF10: 3
PAINLEVEL_OUTOF10: 3
PAINLEVEL_OUTOF10: 0
PAINLEVEL_OUTOF10: 0
PAINLEVEL_OUTOF10: 4
PAINLEVEL_OUTOF10: 0
PAINLEVEL_OUTOF10: 7
PAINLEVEL_OUTOF10: 0

## 2018-01-01 ASSESSMENT — ENCOUNTER SYMPTOMS
STRIDOR: 0
CONSTIPATION: 0
VOMITING: 0
ABDOMINAL DISTENTION: 0
EYE DISCHARGE: 0
CHOKING: 0
SORE THROAT: 0
DIARRHEA: 0
DYSPNEA ASSOCIATED WITH: EXERTION
SHORTNESS OF BREATH: 1
WHEEZING: 0
NAUSEA: 0
EYE DISCHARGE: 0
SORE THROAT: 0
EYE ITCHING: 0
COUGH: 0
ABDOMINAL DISTENTION: 0
EYE REDNESS: 0
COUGH: 0
WHEEZING: 0
DIARRHEA: 0
BACK PAIN: 0
NAUSEA: 0
RHINORRHEA: 0
COUGH: 1
COLOR CHANGE: 0
APNEA: 0
BACK PAIN: 0
SORE THROAT: 0
RHINORRHEA: 0
EYE ITCHING: 0
VOMITING: 0
SHORTNESS OF BREATH: 1
RECTAL PAIN: 0
VOMITING: 0
SHORTNESS OF BREATH: 1
SINUS PAIN: 0
SHORTNESS OF BREATH: 0
DYSPNEA ASSOCIATED WITH: EXERTION
DIARRHEA: 0
EYE DISCHARGE: 0
ABDOMINAL PAIN: 0
WHEEZING: 0
APNEA: 0
ABDOMINAL PAIN: 0
CHOKING: 0
NAUSEA: 0
SHORTNESS OF BREATH: 1
WHEEZING: 0
RECTAL PAIN: 0
ABDOMINAL PAIN: 0
BACK PAIN: 0
DYSPNEA ASSOCIATED WITH: EXERTION
NAUSEA: 0
EYE DISCHARGE: 0
RHINORRHEA: 0
EYE PAIN: 0
EYE REDNESS: 0
PHOTOPHOBIA: 0
STRIDOR: 0
SORE THROAT: 0
SORE THROAT: 0
NAUSEA: 0
DIARRHEA: 0
EYE REDNESS: 0
BACK PAIN: 0
ANAL BLEEDING: 0
VOMITING: 0
WHEEZING: 0
RHINORRHEA: 0
COUGH: 0
VOMITING: 0
EYE REDNESS: 0
VOMITING: 0
DYSPNEA ASSOCIATED WITH: MINIMAL EXERTION
ABDOMINAL PAIN: 0
SINUS PAIN: 0
SORE THROAT: 0
ANAL BLEEDING: 0
COLOR CHANGE: 0
CONSTIPATION: 0
WHEEZING: 0
PHOTOPHOBIA: 0
SHORTNESS OF BREATH: 0
NAUSEA: 0

## 2018-01-01 ASSESSMENT — PAIN DESCRIPTION - ORIENTATION
ORIENTATION: LEFT
ORIENTATION: RIGHT;LEFT
ORIENTATION: RIGHT
ORIENTATION: LEFT

## 2018-01-01 ASSESSMENT — PAIN DESCRIPTION - DESCRIPTORS
DESCRIPTORS: SORE
DESCRIPTORS: SORE

## 2018-01-01 ASSESSMENT — PAIN DESCRIPTION - LOCATION
LOCATION: CHEST
LOCATION: SHOULDER
LOCATION: ARM;SHOULDER
LOCATION: SHOULDER
LOCATION: CHEST

## 2018-01-01 ASSESSMENT — PAIN DESCRIPTION - PAIN TYPE
TYPE: ACUTE PAIN

## 2018-01-01 ASSESSMENT — PAIN DESCRIPTION - DIRECTION: RADIATING_TOWARDS: '

## 2018-01-01 ASSESSMENT — PAIN SCALES - WONG BAKER
WONGBAKER_NUMERICALRESPONSE: 2
WONGBAKER_NUMERICALRESPONSE: 8

## 2018-01-09 NOTE — CARE COORDINATION
Phone line continued to ring with no option for VM. I will attempt again to reach patient for continued Care Coordination follow up and education.

## 2018-01-12 NOTE — H&P
History & Physical        Patient:  Juan Carlos Guidry  YOB: 1928    MRN: [id number]     Acct: [de-identified]    PCP: Tatyana Brar MD    Date of Admission: 1/12/2018    Date of Service: Pt seen/examined on  1/12/18 and Placed in Observation. Chief Complaint:  Confusion and dyspnea      History Of Present Illness:    Mr Yen Gipson is an 80year old man with history of left lung SCLC on radiation therapy. At baseline he lives alone, drives and is independent with his ADLS. He drove to his radiation on 1/8/18. After the radiation, he was noted to be very weak. He was admitted to [facility], and treated for PNA. He was discharged to [facility] on 1/10/18. At the [facility], he was noted to have hallucinations, worsening dyspnea and reduced breath sounds with pulse ox of  on 3.5L O2. Maritza Lombardi ECF RN informed his PCP who sent him to the ER. Discussed with his 2 sons and daughter in-law. They said that prior to discharge from [facility], that patient was growing more lethargic. They also noted new bilateral leg swelling. We discussed code status. Patient does not want to be resuscitated. However, OK with intubation provided it is not futile. Discussed with ER doc. Hx is limited as patient confused. Old records reviewed.       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

## 2018-01-12 NOTE — ED NOTES
Patient resting in bed with no current complaints. Medication given per order. Patient denies current needs. Side rails remain up x2. Call light within reach. Monitoring.       Geraldean Goodell, RN  01/12/18 [id number]

## 2018-01-12 NOTE — ED PROVIDER NOTES
Wheezing    AMIODARONE (CORDARONE) 200 MG TABLET    Take 200 mg by mouth daily    ASPIRIN 81 MG TABLET    Take 81 mg by mouth daily. ATORVASTATIN (LIPITOR) 80 MG TABLET    TAKE ONE TABLET BY MOUTH ONCE DAILY    BLOOD GLUCOSE MONITORING SUPPL (ONE TOUCH ULTRA MINI) W/DEVICE KIT    1 kit by Does not apply route daily Dx E11.8    BUDESONIDE (PULMICORT FLEXHALER) 180 MCG/ACT AEPB INHALER    Inhale 2 puffs into the lungs daily    CAVANAUGH PO    Take 1 capsule by mouth daily. CLOPIDOGREL (PLAVIX) 75 MG TABLET    TAKE ONE TABLET BY MOUTH ONCE DAILY    COENZYME Q10 (COQ10 PO)    Take  by mouth. CRANBERRY 1000 MG CAPS    Take  by mouth. DOXYCYCLINE HYCLATE (VIBRAMYCIN) 100 MG CAPSULE    Take 100 mg by mouth 2 times daily    FISH OIL-OMEGA-3 FATTY ACIDS 1000 MG CAPSULE    Take 2 g by mouth daily. FUROSEMIDE (LASIX) 40 MG TABLET    TAKE ONE TABLET BY MOUTH ONCE DAILY    HYDRALAZINE (APRESOLINE) 10 MG TABLET    TAKE ONE TABLET BY MOUTH 4 TIMES DAILY    INSULIN ASPART PROTAMINE-INSULIN ASPART (NOVOLOG MIX 70/30 FLEXPEN) (70-30) 100 UNIT/ML INJECTION    Inject 15 Units into the skin 2 times daily (with meals)    INSULIN PEN NEEDLE (B-D ULTRAFINE III SHORT PEN) 31G X 8 MM MISC    Inject insulin SQ 2 x daily (Dx: E11.65)    IPRATROPIUM (ATROVENT) 0.02 % NEBULIZER SOLUTION    Take 2.5 mLs by nebulization every 4 hours as needed for Wheezing    LANCETS MISC    Check 2x/day    LISINOPRIL (PRINIVIL;ZESTRIL) 30 MG TABLET    TAKE ONE TABLET BY MOUTH ONCE DAILY    METHENAMINE (HIPREX) 1 G TABLET    Take 1 g by mouth 2 times daily (with meals)    METOPROLOL (LOPRESSOR) 100 MG TABLET    Take 100 mg by mouth 2 times daily. NITROGLYCERIN (NITROSTAT) 0.4 MG SL TABLET    Place 1 tablet under the tongue every 5 minutes as needed for Chest pain    ONE TOUCH ULTRA TEST STRIP    Check blood sugar twice daily (Dx: E11.8)    PANTOPRAZOLE (PROTONIX) 40 MG TABLET    Take 40 mg by mouth daily.       PREDNISONE (DELTASONE) 10 MG TABLET    Take 20 mg by mouth 2 times daily Give for 5 days (start date was 01/10/2018)    PRIMIDONE (MYSOLINE) 250 MG TABLET    Take 50 mg by mouth nightly     PROPAFENONE (RYTHMOL) 150 MG TABLET    Take 150 mg by mouth every 8 hours    SCOOTER MISC    by Does not apply route. TAMSULOSIN (FLOMAX) 0.4 MG CAPSULE    TAKE ONE CAPSULE BY MOUTH TWICE DAILY    TERAZOSIN (HYTRIN) 5 MG CAPSULE    TAKE ONE CAPSULE BY MOUTH ONCE EVERY NIGHT    THERAPEUTIC MULTIVITAMIN-MINERALS (THERAGRAN-M) TABLET    Take 1 tablet by mouth daily. ALLERGIES     is allergic to quinidine. FAMILY HISTORY     indicated that his mother is . He indicated that his father is . family history includes Diabetes in his mother; Heart Disease in his father and mother. SOCIAL HISTORY      reports that he quit smoking about 25 years ago. He quit after 45.00 years of use. He has never used smokeless tobacco. He reports that he does not drink alcohol or use drugs. PHYSICAL EXAM     INITIAL VITALS:  height is 6' (1.829 m) and weight is 250 lb (113.4 kg). His oral temperature is 97.7 °F (36.5 °C). His blood pressure is 179/76 (abnormal) and his pulse is 84. His respiration is 20 and oxygen saturation is 99%. Physical Exam   Constitutional: He is oriented to person, place, and time. He appears well-developed and well-nourished. HENT:   Head: Normocephalic and atraumatic. Right Ear: External ear normal.   Left Ear: External ear normal.   Eyes: Conjunctivae are normal. Right eye exhibits no discharge. Left eye exhibits no discharge. No scleral icterus. Neck: Normal range of motion. Neck supple. No JVD present. Cardiovascular: Normal rate, regular rhythm and normal heart sounds. Exam reveals no gallop and no friction rub. No murmur heard. Pulmonary/Chest: Effort normal. No stridor. No respiratory distress. He has decreased breath sounds (very diminished bilaterally). He has no wheezes. He has no rales. Abdominal: Soft. He exhibits no distension. There is no tenderness. There is no rebound and no guarding. A hernia is present. Musculoskeletal: Normal range of motion. Right lower leg: He exhibits swelling and edema. He exhibits no tenderness. Left lower leg: He exhibits swelling and edema. He exhibits no tenderness. Swollen lump to his back 5cm in diameter with no erythema, bilateral pitting edema with the right greater than left. Neurological: He is alert and oriented to person, place, and time. He exhibits normal muscle tone. GCS eye subscore is 4. GCS verbal subscore is 5. GCS motor subscore is 6. Skin: Skin is warm and dry. He is not diaphoretic. No erythema. Psychiatric: He has a normal mood and affect. His behavior is normal.   Nursing note and vitals reviewed. DIFFERENTIAL DIAGNOSIS:   Pneumonia, worsening cancer, pleural effusions, sepsis    DIAGNOSTIC RESULTS     EKG: All EKG's are interpreted by the Emergency Department Physician who either signs or Co-signs this chart in the absence of a cardiologist.  EKG interpreted by Tim Tan MD:    Vent. Rate: 75 bpm  NE interval: 140 ms  QRS duration: 172 ms  QTc: 477 ms  P-R-T axes: 75, 112, 60  Normal sinus rhythm, RBBB, No STEMI  Compared to old EKG on 4/20/2013      RADIOLOGY: non-plain film images(s) such as CT, Ultrasound and MRI are read by the radiologist.    XR CHEST 1 VW   Final Result   1. Small bilateral pleural effusions with adjacent atelectasis/infiltrate. 2. Left upper and right midlung atelectasis/infiltrate. 3. Probable pulmonary edema. 4. Mild stable cardiomegaly. Final report electronically signed by Dr. Tammy Resendiz on 1/12/2018 11:36 AM      CTA Chest W WO Contrast   Final Result   1. No CT evidence of pulmonary embolism. 2. Enlarged main pulmonary artery suspicious for pulmonary arterial hypertension. 3. Moderate bilateral pleural effusions with adjacent atelectasis/infiltrate.    4. Slightly improved subpleural masslike density in the anterior left upper lung. 5. New masslike density in the lingula of indeterminate etiology. Malignancy cannot be excluded. 6. Stable masses in the bilateral adrenal glands. Final report electronically signed by Dr. Herminia Welch on 1/12/2018 11:24 AM      CT HEAD WO CONTRAST   Final Result   1. No acute intracranial hemorrhage or mass effect. 2. Mild patchy periventricular and subcortical white matter hypoattenuation is demonstrated likely representing sequela from mild chronic small vessel ischemic disease. However, there is a more focal area of low density demonstrated within the    subcortical white matter of the right parietal lobe which appears more prominent when compared to prior examination from April 2013. This may represent an age-indeterminate infarct. **This report has been created using voice recognition software. It may contain minor errors which are inherent in voice recognition technology. **      Final report electronically signed by Dr. José Friday on 1/12/2018 11:22 AM      XR CHEST PORTABLE    (Results Pending)   IR GUIDED THORACENTESIS PLEURAL    (Results Pending)       LABS:   Labs Reviewed   CBC WITH AUTO DIFFERENTIAL - Abnormal; Notable for the following:        Result Value    WBC 12.5 (*)     RBC 3.35 (*)     Hemoglobin 10.7 (*)     Hematocrit 32.7 (*)     MCV 97.7 (*)     MCH 32.0 (*)     MCHC 32.8 (*)     RDW 15.6 (*)     Platelets 99 (*)     All other components within normal limits   BASIC METABOLIC PANEL - Abnormal; Notable for the following:     CO2 37 (*)     Glucose 193 (*)     BUN 36 (*)     All other components within normal limits   TROPONIN - Abnormal; Notable for the following:     Troponin T 0.050 (*)     All other components within normal limits   BRAIN NATRIURETIC PEPTIDE - Abnormal; Notable for the following:     Pro-BNP 5620.0 (*)     All other components within normal limits   HEPATIC FUNCTION

## 2018-01-12 NOTE — TELEPHONE ENCOUNTER
Patient nurse Joleen Mills calling voicing patient is having increased hallucinations in the last 48 hours along with shallow breathing, and tight lungs. Patients pulse ox is 82-88% on 3.5L via NC. Patient son would like patient to be evaluated. Per orders of Dr. Orlando Martinez, advised Joleen Mills to have patient sent out to ER to be evaluated. No concerns voiced.

## 2018-01-12 NOTE — ED NOTES
[facility] called to confirm findings at the home per Dr. Becca Cullen. The RN taking care of patient states the patient had difficulty breathing this morning and using accessory muscles. Report states patient with O2 sat=84% on 4L NC. Patient also reported to be hallucinating last evening with \"water on the walls\" and \"birds on the walls\". Patient currently receiving radiation treatment for lung CA. Per nursing home, patient is full code status.       Aramis Cantrell RN  01/12/18 1754

## 2018-01-13 NOTE — PROGRESS NOTES
Hospitalist Progress Note    Patient:  Jeison Baker      Unit/Bed:4K-10/010-A    YOB: 1928    MRN: [id number]       Acct: [de-identified]     PCP: Micki Rivera MD    Date of Admission: 1/12/2018  --------------------------    Chief Complaint:     Mental status changes, shortness of breath. Hospital Course:       43-year-old male patient with recent history of lung cancer undergoing and radiation therapy, recently R [facility] 11 and sent to the ECF 2 days ago brought back to the emergency room for shortness of breath. Assessment:       1. Acute on chronic hypoxic, hypercapnic respiratory failure,  Improved with BiPAP. Repeated ABG noted. 2. Probable acute on chronic diastolic CHF  3. Bilateral pleural effusion with compressive atelectasis  status post right-sided thoracentesis removal of 1200ml   Consistent with transudate  4.  elevated troponin without chest pain, likely demand ischemia  5. Acute toxic metabolic encephalopathy, improving. 6.   Hypertensive urgency, improving. 7.      comorbidities:    · Lung cancer undergoing radiation therapy  · Thrombocytosis  · morbid obesity Body mass index is 32.96 kg/m². · History of prostatic hyperplasia  · History of  CLL with Thrombocytopenia  ·  Diabetes mellitus type 2      Plan:  1.  continue respiratory care protocol,  Bronchodilator. Oxygen supplementation. BiPAP when necessary. 2.   Obtain a limited echo  3.  consult pulmonary and cardiology. 4. Continue selected home medications as ordered. 5. Change insulin to basel bolus dosing          Code Status: Limited         DVT prophylaxis: Lovenox      Disposition:  ecf in 2-3 days    I discussed my though processes at length with patient/family and patient understood.  Question and concerns  Addressed      Discussed with RN      ----------------  Subjective:     Patient seen and examined  Overnight events noted  RN and ancillary staff note reviewed    Awake and conversant  Removed the BIPAP last night  Breathing  Better  Family at bedside              Diet: DIET CARDIAC;      Medications:  Reviewed    Infusion Medications    dextrose 5 % and 0.9 % NaCl       Scheduled Medications    furosemide  60 mg Intravenous BID    pantoprazole  40 mg Oral Daily    amiodarone  200 mg Oral Daily    aspirin  81 mg Oral Daily    atorvastatin  80 mg Oral Daily    clopidogrel  75 mg Oral Daily    beclomethasone  2 puff Inhalation BID    hydrALAZINE  10 mg Oral 4 times per day    insulin lispro protamine & lispro  13 Units Subcutaneous BID WC    lisinopril  20 mg Oral Daily    methenamine  1 g Oral BID WC    metoprolol  100 mg Oral BID    primidone  50 mg Oral Nightly    sodium chloride flush  10 mL Intravenous 2 times per day    enoxaparin  40 mg Subcutaneous Q24H    insulin lispro  0-12 Units Subcutaneous TID WC    insulin lispro  0-6 Units Subcutaneous Nightly    ipratropium-albuterol  1 ampule Inhalation Q4H WA    doxazosin  4 mg Oral Nightly     PRN Meds: sodium chloride flush, acetaminophen, magnesium hydroxide, ondansetron, glucose, dextrose, glucagon (rDNA), dextrose 5 % and 0.9 % NaCl, potassium chloride **OR** potassium chloride **OR** potassium chloride, magnesium sulfate, nitroGLYCERIN      Intake/Output Summary (Last 24 hours) at 01/13/18 1518  Last data filed at 01/13/18 1419   Gross per 24 hour   Intake          1637.29 ml   Output             4600 ml   Net         -2962.71 ml       Diet:  DIET CARDIAC; Exam:  /60   Pulse 62   Temp 97.8 °F (36.6 °C) (Oral)   Resp 17   Ht 6' (1.829 m)   Wt 243 lb (110.2 kg)   SpO2 92%   BMI 32.96 kg/m²      Gen: Not in distress. Alert. Head: Normocephalic. Atraumatic. Eyes: Conjunctivae/corneas clear. ENT: limited by BIPAP  Neck: No JVD. No obvious thyromegaly.   CVS: Nml S1S2, no MRG, RRR  Pulmomary: reduced air entry   Gastrointestinal: Soft, non tender, non distend,  Positive bowel

## 2018-01-14 NOTE — PROGRESS NOTES
Daily    beclomethasone  2 puff Inhalation BID    hydrALAZINE  10 mg Oral 4 times per day    lisinopril  20 mg Oral Daily    methenamine  1 g Oral BID WC    metoprolol  100 mg Oral BID    primidone  50 mg Oral Nightly    sodium chloride flush  10 mL Intravenous 2 times per day    enoxaparin  40 mg Subcutaneous Q24H    insulin lispro  0-12 Units Subcutaneous TID WC    insulin lispro  0-6 Units Subcutaneous Nightly    ipratropium-albuterol  1 ampule Inhalation Q4H WA    doxazosin  4 mg Oral Nightly      dextrose 5 % and 0.9 % NaCl         sodium chloride flush 10 mL PRN   acetaminophen 650 mg Q4H PRN   magnesium hydroxide 30 mL Daily PRN   ondansetron 4 mg Q6H PRN   glucose 15 g PRN   dextrose 12.5 g PRN   glucagon (rDNA) 1 mg PRN   dextrose 5 % and 0.9 % NaCl 100 mL/hr PRN   potassium chloride 40 mEq PRN   Or     potassium chloride 40 mEq PRN   Or     potassium chloride 10 mEq PRN   magnesium sulfate 1 g PRN   nitroGLYCERIN 0.4 mg Q5 Min PRN       Diagnostics:  EK2018 10:00:38 [facility] ROUTINE RETRIEVAL  Normal sinus rhythm  Right bundle branch block  Abnormal ECG  When compared with ECG of 2013 07:03,  Confirmed by Meredith Guerra ([id number]) on 2018 4:12:59 PM    Echo:    Electronically signed by Dimas Heller MD (Interpreting   physician) on 2018 at 08:47 PM   ----------------------------------------------------------------      Findings      Mitral Valve   The mitral valve structure was normal with normal leaflet separation.   DOPPLER: The transmitral velocity was within the normal range with no   evidence for mitral stenosis. There was mild mitral regurgitation.      Aortic Valve   The aortic valve was trileaflet with increased thickness and normal cuspal   separation.   No evidence of aortic stenosis.  There was no evidence of aortic   regurgitation.      Tricuspid Valve   The tricuspid valve structure was normal with normal leaflet separation.   DOPPLER:

## 2018-01-14 NOTE — CONSULTS
 Obesity     Paroxysmal a-fib (HCC)     Pneumonia     Presence of aortocoronary bypass graft     Presence of cardiac and vascular implant and graft     Prostate disease     Shingles     history of    Skin cancer     Dr Alli Treviño    Squamous cell carcinoma lung, left ([address] 12/11/2017    Tobacco abuse     history of       Past Surgical History:          Procedure Laterality Date    APPENDECTOMY      BACK SURGERY  1995    discectomy    CARDIAC SURGERY  1992    CABG    CATARACT REMOVAL Bilateral     COLONOSCOPY  11/2005    HERNIA REPAIR      bilateral inguinal    INNER EAR SURGERY      x2 , Dr Ambika Quintanilla LIVER BIOPSY      OTHER SURGICAL HISTORY      excision of basal cell carcinoma    OTHER SURGICAL HISTORY  2000    right lower eyelid    PACEMAKER PLACEMENT         Medications Prior to Admission:      Prior to Admission medications    Medication Sig Start Date End Date Taking?  Authorizing Provider   amiodarone (CORDARONE) 200 MG tablet Take 200 mg by mouth daily   Yes Historical Provider, MD   doxycycline hyclate (VIBRAMYCIN) 100 MG capsule Take 100 mg by mouth 2 times daily 1/10/18 2/20/18 Yes Historical Provider, MD   predniSONE (DELTASONE) 20 MG tablet Take 20 mg by mouth 2 times daily Give for 5 days (start date was 01/10/2018) 1/10/18 1/15/18 Yes Historical Provider, MD   budesonide (PULMICORT FLEXHALER) 180 MCG/ACT AEPB inhaler Inhale 2 puffs into the lungs daily   Yes Historical Provider, MD   insulin aspart protamine-insulin aspart (NOVOLOG MIX 70/30 FLEXPEN) (70-30) 100 UNIT/ML injection Inject 13 Units into the skin 2 times daily   Yes Historical Provider, MD   atorvastatin (LIPITOR) 80 MG tablet TAKE ONE TABLET BY MOUTH ONCE DAILY 12/20/17  Yes Javier Moses MD   Insulin Pen Needle (B-D ULTRAFINE III SHORT PEN) 31G X 8 MM MISC Inject insulin SQ 2 x daily (Dx: E11.65) 12/18/17  Yes Chilango Gongora, CNP   hydrALAZINE (APRESOLINE) 10 MG tablet TAKE ONE TABLET BY MOUTH 4 TIMES DAILY There is 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 There is no pericardial effusion. Moderate bilateral pleural effusions are present. There are areas of adjacent lung consolidation. Fibrotic and emphysematous changes are noted in the bilateral lungs. A previously identified subpleural masslike density in the anterior left upper lung now measures approximately 5.6 x 2.6 cm (image 49; prior measurement 5.8 x 2.6 cm). There is an additional masslike density in the lingula measuring approximately 3.0 x 2.5 cm (image 79). There is no mediastinal, hilar or axillary lymphadenopathy. Degenerative changes are seen in the thoracic spine without evidence of aggressive osseous lesions. A 3.6 cm hypoattenuating mass in the right adrenal gland is stable (image 198). A 2.7 cm left adrenal mass is also stable (image 198). There is a probable splenule anterior to the spleen. 1. No CT evidence of pulmonary embolism. 2. Enlarged main pulmonary artery suspicious for pulmonary arterial hypertension. 3. Moderate bilateral pleural effusions with adjacent atelectasis/infiltrate. 4. Slightly improved subpleural masslike density in the anterior left upper lung. 5. New masslike density in the lingula of indeterminate etiology. Malignancy cannot be excluded. 6. Stable masses in the bilateral adrenal glands. Final report electronically signed by Dr. Aracely Menezes on 1/12/2018 11:24 AM    Xr Chest Portable    Result Date: 1/12/2018  PROCEDURE: XR CHEST PORTABLE CLINICAL INFORMATION: Status post US guided right thoracentesis,  . COMPARISON: 1/12/2018 TECHNIQUE: Portable semiupright FINDINGS: No pneumothorax post thoracentesis marked improved aeration of the right lower lobe. No pneumothorax post thoracentesis **This report has been created using voice recognition software. It may contain minor errors which are inherent in voice recognition technology. ** Final and there were no immediate complications. 1.  Uncomplicated ultrasound guided diagnostic and therapeutic right thoracentesis. **This report has been created using voice recognition software. It may contain minor errors which are inherent in voice recognition technology. ** Final report electronically signed by Dr. Moose Velasco on 1/12/2018 3:55 PM        Assessment/Plan:    Acute respiratory failure with hypoxia - multifactorial  Bilateral pleural effusion  Diastolic heart failure  Possible pneumonia  Lung cancer  Being managed very well by primary team.    Cardiology consultation was requested for CHF management. Normal EF  Diastolic heart failure and right-sided heart failure  Continue IV Lasix for now. Continue metoprolol, lisinopril and hydralazine. Coronary artery disease/CABG  Seems to be stable. Continue ASA/Plavix/BB/Statin. No need for any invasive cardiac workup at this time. Code Status: Limited    Thank you for allowing me to participate in the care of your patient.  Please don't hesitate to contact me regarding any further issues related to the patient care       Diego Barragan MD, [facility] Conteh, [facility]  Electronically signed 1/13/2018 at 9:24 PM

## 2018-01-14 NOTE — PLAN OF CARE
Problem: Falls - Risk of  Goal: Absence of falls  Outcome: Met This Shift  No falls this shift. Tele-sitter in room. Bed alarm on, frequent rounding, call light within reach. Problem: DISCHARGE BARRIERS  Goal: Patient's continuum of care needs are met  Outcome: Ongoing  Plan is to discharge back to [facility] when medically stable.      Problem: Pain Control  Goal: Maintain pain level at or below patient's acceptable level (or 5 if patient is unable to determine acceptable level)  Outcome: Met This Shift  Patient had no complaints of pain     Problem: Neurological  Goal: Maximum potential motor/sensory/cognitive function  Outcome: Ongoing  Does have confusion and some motor weakness. Tele sitter placed in room for confusion. Will continue to monitor.      Problem: Cardiovascular  Goal: No DVT, peripheral vascular complications  Outcome: Ongoing  No signs of DVT. On Lovenox  Goal: Hemodynamic stability  Outcome: Ongoing  Vital signs stable.      Problem: Respiratory  Goal: No pulmonary complications  Outcome: Ongoing  Co2 high on admission. On BiPap. Goal: O2 Sat > 90%  Outcome: Ongoing  O2 sats>90% Patient on bipap. Normally wears 2-4L NC at home.      Problem:   Goal: Adequate urinary output  Outcome: Ongoing  Patient has Currie. Having good urine output.       Problem: Skin Integrity/Risk  Goal: No skin breakdown during hospitalization  Outcome: Ongoing  No new skin issues this shift. Patient turns self frequently. Goal: Wound healing  Outcome: Completed Date Met: 01/13/18  No signs of wounds     Problem: Urinary Elimination:  Goal: Signs and symptoms of infection will decrease  Signs and symptoms of infection will decrease  Outcome: Ongoing  No complications. Clear, yellow urine draining.    Goal: Complications related to the disease process, condition or treatment will be avoided or minimized  Complications related to the disease process, condition or treatment will be avoided or

## 2018-01-14 NOTE — CONSULTS
Department of Internal Medicine  Pulmonary  Attending Consult Note      Reason for Consult:  Respiratory failure  Requesting Physician:  Hospitalist    CHIEF COMPLAINT:  Shortness of breath    History Obtained From:  patient    HISTORY OF PRESENT ILLNESS:                The patient is a 80 y.o. male presents to [facility] after 1 day at an ECF due to altered mental status and difficulty breathing. The patient was recently discharged from [facility] after being treated for heart failure and PMTs. The patient was diuresed at [facility] and his pacemaker was adjusted for the arrhythmias. The patient was discharged in a euvolemic state but then developed worsening shortness of breath and sent back to hospital to [facility]. The patient was found to have a hypercapnia and put on bipap that helped improve his respiratory failure, but also noted to be having fluid overloaded and was started on aggressive diuresis. The patient is currently confused, but denies any complaints currently.     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 (HCC)     Atrial fibrillation (HCC)     BPH (benign prostatic hyperplasia) 7/30/2012    CAD (coronary artery disease)     Dr Irma Sarkar Cardiac pacemaker     Cavernous hemangioma     of liver, history of    Cerebrovascular accident ([address]     CHF (congestive heart failure) ([address] 7/30/2012    Dr Tapan Amanda    Chronic lymphocytic leukemia of B-cell type ([address]     Chronic sinusitis     CLL (chronic lymphocytic leukemia) ([address] 12/2012    Dr Joey Sun COPD (chronic obstructive pulmonary disease) ([address]     Diabetes mellitus type II, uncontrolled ([address] 7/30/2012    Dyspnea on exertion     Essential tremor 7/30/2012    [facility], [facility] Robyn Hall GERD (gastroesophageal reflux disease)     Hyperkalemia     Hyperlipidemia     Hypertension     Idiopathic gout     Incontinence     Kidney stone     [facility]     Malignant neoplasm of upper lobe, left bronchus or lung (HCC)     Muscle weakness     Nephrolithiasis     Nonspecific abnormal finding of lung field     Obesity     Paroxysmal a-fib (HCC)     Pneumonia     Presence of aortocoronary bypass graft     Presence of cardiac and vascular implant and graft     Prostate disease     Shingles     history of    Skin cancer     Dr Damien Bal    Squamous cell carcinoma lung, left ([facility] 12/11/2017    Tobacco abuse     history of     Past Surgical History:        Procedure Laterality Date    APPENDECTOMY      BACK SURGERY  1995    discectomy    CARDIAC SURGERY  1992    CABG    CATARACT REMOVAL Bilateral     COLONOSCOPY  11/2005    HERNIA REPAIR      bilateral inguinal    INNER EAR SURGERY      x2 , Dr Sonya Lob LIVER BIOPSY      OTHER SURGICAL HISTORY      excision of basal cell carcinoma    OTHER SURGICAL HISTORY  2000    right lower eyelid    PACEMAKER PLACEMENT       Current Medications:    Current Facility-Administered Medications: furosemide (LASIX) injection 60 mg, 60 mg, Intravenous, BID  insulin glargine (LANTUS) injection vial 12 Units, 12 Units, Subcutaneous, Nightly  insulin lispro (HUMALOG) injection vial 4 Units, 4 Units, Subcutaneous, TID WC  pantoprazole (PROTONIX) tablet 40 mg, 40 mg, Oral, Daily  amiodarone (CORDARONE) tablet 200 mg, 200 mg, Oral, Daily  aspirin EC tablet 81 mg, 81 mg, Oral, Daily  atorvastatin (LIPITOR) tablet 80 mg, 80 mg, Oral, Daily  clopidogrel (PLAVIX) tablet 75 mg, 75 mg, Oral, Daily  beclomethasone (QVAR) 80 MCG/ACT inhaler 2 puff, 2 puff, Inhalation, BID  hydrALAZINE (APRESOLINE) tablet 10 mg, 10 mg, Oral, 4 times per day  lisinopril (PRINIVIL;ZESTRIL) tablet 20 mg, 20 mg, Oral, Daily  methenamine (HIPREX) tablet 1 g, 1 g, Oral, BID WC  metoprolol (108.5 kg)   SpO2 94%   BMI 32.44 kg/m²     CONSTITUTIONAL:  awake, alert, cooperative, no apparent distress, and appears stated age  EYES:  Lids and lashes normal, pupils equal, round and reactive to light, extra ocular muscles intact, sclera clear, conjunctiva normal  ENT:  Normocephalic, without obvious abnormality, atraumatic, sinuses nontender on palpation, external ears without lesions, oral pharynx with moist mucus membranes, tonsils without erythema or exudates, gums normal and good dentition. NECK:  Supple, symmetrical, trachea midline, no adenopathy, thyroid symmetric, not enlarged and no tenderness, skin normal  HEMATOLOGIC/LYMPHATICS:  no cervical lymphadenopathy  BACK:  Symmetric, no curvature, spinous processes are non-tender on palpation, paraspinous muscles are non-tender on palpation, no costal vertebral tenderness  LUNGS:  No increased work of breathing, good air exchange, clear to auscultation bilaterally, no crackles or wheezing - decreased sounds  CARDIOVASCULAR:  Normal apical impulse, regular rate and rhythm, normal S1 and S2, no S3 or S4, and no murmur noted  ABDOMEN:  No scars, normal bowel sounds, soft, non-distended, non-tender, no masses palpated, no hepatosplenomegally  MUSCULOSKELETAL:  There is no redness, warmth, or swelling of the joints. Full range of motion noted. Motor strength is 5 out of 5 all extremities bilaterally. Tone is normal.  NEUROLOGIC:  Awake, alert, oriented to name but confused to time and location. Cranial nerves II-XII are grossly intact. Motor is 5 out of 5 bilaterally. Cerebellar finger to nose, heel to shin intact. Sensory is intact.   Babinski down going, Romberg negative, and gait is normal.  SKIN:  no bruising or bleeding  DATA:    CBC:   Lab Results   Component Value Date    WBC 11.2 01/14/2018    RBC 3.54 01/14/2018    HGB 11.5 01/14/2018    HCT 34.3 01/14/2018    MCV 96.7 01/14/2018    MCH 32.5 01/14/2018    MCHC 33.6 01/14/2018    RDW 14.9 01/14/2018    PLT 81 01/14/2018    MPV 8.2 01/14/2018     CMP:    Lab Results   Component Value Date     01/14/2018    K 3.9 01/14/2018    CL 96 01/14/2018    CO2 39 01/14/2018    BUN 28 01/14/2018    CREATININE 1.1 01/14/2018    AGRATIO 2.9 01/29/2015    LABGLOM 63 01/14/2018    GLUCOSE 104 01/14/2018    GLUCOSE 170 01/04/2018    PROT 5.4 01/12/2018    LABALBU 3.3 01/14/2018    CALCIUM 8.7 01/14/2018    BILITOT 0.4 01/12/2018    ALKPHOS 65 01/12/2018    AST 14 01/12/2018    ALT 21 01/12/2018     ABG:    Lab Results   Component Value Date    PH 7.40 01/13/2018    PH 7.34 01/04/2018    PCO2 66 01/13/2018    PO2 89 01/13/2018    HCO3 41 01/13/2018    O2SAT 96 01/13/2018       IMPRESSION/RECOMMENDATIONS:      Acute on chronic hypoxemic respiratory failure  Acute on chronic hypercapnic respiratory failure  Acute exacerbation of systolic CHF  Pulmonary edema  Lung cancer, undergoing radiation treatment    Plan: At this time, continue the bipap at night - the patient will require the bipap when discharged to the SNF as he has respiratory failure due to COPD and this has helped his situation the most at this time. The patient to continue with nebulizer treatments, but will reduce frequency to avoid tachycardia type syndrome. The patient's pacemaker information was printed for nursing - recently interrogated showing PMTs so was adjusted. The patient will continue with diuresis and monitor fluid balance. The patient's overall prognosis is poor due to failing mental status, but appeared he was A&Ox3 when left [facility]. I will continue to follow and make recommendations as his care progresses. Thank you for the consultation.

## 2018-01-14 NOTE — PROGRESS NOTES
Hospitalist Progress Note    Patient:  Jazmin Cavazos      Unit/Bed:4K-19/019-A    YOB: 1928    MRN: [id number]       Acct: [de-identified]     PCP: Deirdre Virk MD    Date of Admission: 1/12/2018  --------------------------    Chief Complaint:     Mental status changes, shortness of breath. Hospital Course:       59-year-old male patient with recent history of lung cancer undergoing and radiation therapy, recently R [address] and sent to the [facility] 2 days ago brought back to the emergency room for shortness of breath. Assessment:       1. Acute on chronic hypoxic, hypercapnic respiratory failure,  Improved with BiPAP. Repeated ABG noted. 2. Acute on chronic diastolic CHF  3. Bilateral pleural effusion with compressive atelectasis  status post right-sided thoracentesis removal of 1200ml   Consistent with transudate  4.  elevated troponin without chest pain, likely demand ischemia  5. Acute toxic metabolic encephalopathy, improving. CT head finding noted   6. Hypertensive urgency, improving. comorbidities:    · Lung cancer undergoing radiation therapy  · morbid obesity Body mass index is 32.44 kg/m². · History of prostatic hyperplasia  · History of  CLL with Thrombocytopenia  ·  Diabetes mellitus type 2      Plan:  1.  continue respiratory care protocol,  . Oxygen supplementation. BiPAP when necessary. Echo noted. Repeat CXR to assess the need for  Thoracentesis on the left sidein the . Diuresing well cardiology following. pulmonary consulted  2. Would like to get MRI of the brain  (afib, hx of malignancy), unclear if ICD compatible ( currently interrogated)  patient arrived in aspirin and Plavix. Will be challenging for anticoagulation in the light of the thrombocytopenia. Will consult neurology   3.  consult pulmonary and cardiology. 4. Continue selected home medications as ordered.   5. Continue current insulin  Dosing          Code Status: Limited 4450 ml   Net            -3104 ml       Diet:  DIET CARDIAC; Exam:  /60   Pulse 66   Temp 97.6 °F (36.4 °C) (Oral)   Resp 18   Ht 6' (1.829 m)   Wt 239 lb 3.2 oz (108.5 kg)   SpO2 94%   BMI 32.44 kg/m²      Gen: Not in distress. Alert but confused at time  Head: Normocephalic. Atraumatic. Eyes: Conjunctivae/corneas clear. Neck: No JVD. No obvious thyromegaly. CVS: Nml S1S2, no MRG, RRR  Pulmomary: reduced air entry , unchanged   Gastrointestinal: Soft, non tender, non distend,  Positive bowel sounds. Musculoskeletal: trace edema. Warm  Neuro: confused at time  No focal deficit. Moves extremity spontaneously. Psychiatry: Appropriate affect. Not agitated. Labs:   Recent Labs      01/12/18   1050  01/13/18   0428  01/14/18   0521   WBC  12.5*  9.5  11.2*   HGB  10.7*  10.7*  11.5*   HCT  32.7*  32.7*  34.3*   PLT  99*  87*  81*     Recent Labs      01/12/18   1051  01/13/18   0428  01/14/18   0521   NA  143  146*  142   K  4.4  4.4  3.9   CL  99  101  96*   CO2  37*  37*  39*   BUN  36*  32*  28*   CREATININE  1.1  1.0  1.1   CALCIUM  9.2  9.0  8.7   PHOS   --    --   2.1*     Recent Labs      01/12/18   1051   AST  14   ALT  21   BILIDIR  <0.2   BILITOT  0.4   ALKPHOS  65     Recent Labs      01/12/18   1050   INR  1.01     No results for input(s): Sameul Kevin in the last 72 hours. Urinalysis:      Lab Results   Component Value Date    NITRU NEGATIVE 01/12/2018    WBCUA NONE 01/12/2018    WBCUA 0-5 01/02/2015    BACTERIA NONE 01/12/2018    RBCUA 3-5 01/12/2018    BLOODU NEGATIVE 01/12/2018    SPECGRAV 1.020 01/23/2014    SPECGRAV 1.010 10/19/2013    GLUCOSEU NEGATIVE 01/12/2018       Radiology:  US THORACENTESIS   Final Result   1. Uncomplicated ultrasound guided diagnostic and therapeutic right thoracentesis. **This report has been created using voice recognition software. It may contain minor errors which are inherent in voice recognition technology. **

## 2018-01-15 NOTE — PROGRESS NOTES
Decreased strength  Assessment: pt tolerated fair, pt on O2 with use of RW, generalized weakness and deconditioning with increase assist for safe mobility, safety concerns for home mobility, recommend cont PT to increase pt functional mobility  Prognosis: Good    Clinical Presentation: Moderate - Evolving with Changing Characteristics:    pt on O2 with use of RW, generalized weakness and deconditioning with increase assist for safe mobility, safety concerns for home mobility, recommend cont PT    Decision Making: High Complexitybased on patient assessment and decision making process of determining plan of care and establishing reasonable expectations for measurable functional outcomes    REQUIRES PT FOLLOW UP: Yes  Discharge Recommendations: Continue to assess pending progress, Subacute/Skilled Nursing Facility, Patient would benefit from continued therapy after discharge    Patient Education:  Patient Education: POC, HEP for BLE therex for strengthening and endurance    Equipment Recommendations: Other: cont to assess needs    Safety:  Type of devices:  All fall risk precautions in place, Left in chair, Telesitter in use, Call light within reach, Chair alarm in place, Nurse notified, Gait belt, Patient at risk for falls    Plan:  Times per week: 3-5X GM  Times per day: Daily  Specific instructions for Next Treatment: therex and mobility  Current Treatment Recommendations: Strengthening, Balance Training, Endurance Training, Functional Mobility Training, Transfer Training, Gait Training, Equipment Evaluation, Education, & procurement, Patient/Caregiver Education & Training, Safety Education & Training, Home Exercise Program    Goals:  Patient goals : plans return to SNF  Short term goals  Time Frame for Short term goals: 2 weeks  Short term goal 1: bed mobility with SBA to get in/out of bed  Short term goal 2: transfer with SBA to get in/out of chairs  Short term goal 3: amb >50'x1 with RW and CGA to walk safely to bathroom  Long term goals  Time Frame for Long term goals : no LTGs set secondary to short ELOS    Evaluation Complexity: Based on the findings of patient history, examination, clinical presentation, and decision making during this evaluation, the evaluation of Natanael Anthony  is of medium complexity. PT G-Codes  Functional Limitation: Mobility: Walking and moving around  Mobility: Walking and Moving Around Current Status (): At least 40 percent but less than 60 percent impaired, limited or restricted  Mobility: Walking and Moving Around Goal Status ():  At least 40 percent but less than 60 percent impaired, limited or restricted       AM-PAC Inpatient Mobility without Stair Climbing Raw Score : 15  AM-PAC Inpatient without Stair Climbing T-Scale Score : 43.03  Mobility Inpatient CMS 0-100% Score: 47.43  Mobility Inpatient without Stair CMS G-Code Modifier : CK

## 2018-01-15 NOTE — PROGRESS NOTES
[facility]  INPATIENT OCCUPATIONAL THERAPY  STRZ ICU STEPDOWN TELEMETRY 4K  EVALUATION    Time:  Time In: 8  Time Out: 06  Timed Code Treatment Minutes: 0 Minutes  Minutes: 16    Date: 1/15/2018  Patient Name: Bev Crook,   Gender: male      MRN: [id number]  : 1928  (80 y.o.)  Referring Practitioner: Mehdi Wesley MD  Diagnosis: dyspnea  Additional Pertinent Hx: Per ED note on 18: Bev Crook is a 80 y.o. male with a history of left upper lobe carcinoma with consolidations and plural effusions who presents to the Emergency Department for the evaluation of shortness of breath and a cough. The patient is sent from the nursing home via EMS with a low pulse oxygen level (80% on 4L O2). He was recently diagnosed with pneumonia which is being treated. The nursing home reported that the patient was confused last night. He denies any other signs or symptoms at this time. The patient has not yet been treated for his cancer, and reports that his current cancer is fairly new but states he had cancer before and was in remission.  had thoracentesis for R pleural effusion.      Restrictions/Precautions:  Restrictions/Precautions: General Precautions, Fall Risk    Position Activity Restriction  Other position/activity restrictions: up as tolerated; telesitter         Past Medical History:   Diagnosis Date    Acute and chronic respiratory failure with hypoxia ([address]     Acute respiratory distress syndrome (ARDS) (HCC)     Adrenal disease (HCC)     Dr Felipe Jain Adrenal tumor 2012    Atherosclerotic heart disease of native coronary artery with angina pectoris ([address]     Atrial fibrillation (HCC)     BPH (benign prostatic hyperplasia) 2012    CAD (coronary artery disease)     Dr Jayme Guevara Cardiac pacemaker     Cavernous hemangioma     of liver, history of    Cerebrovascular accident ([address]     CHF (congestive heart failure) ([address] 2012    Dr Jenn Rubio [employer]    Movements Are Fluid And Coordinated: Yes    ADL  LE Dressing: Dependent/Total (doffing/donning socks; pt declined attempting to complete)  Additional Comments: Pt declined any further ADLs due to fatigue. Bed mobility  Rolling to Left: Supervision (use of bedrails; increased time)  Rolling to Right: Supervision (use of bedrails, increased time)  Supine to Sit: Contact guard assistance (HOB elevated, use of bedrails)  Sit to Supine: Contact guard assistance (HOB elevated, use of bedrails)    Transfers  Sit to stand: Contact guard assistance (from EOB)  Stand to sit: Contact guard assistance (to EOB)    Balance  Sitting Balance: Stand by assistance (at EOB)  Standing Balance: Contact guard assistance (with RW)     Time: X30 seconds  Activity: prep for mobility  Comment: RW for support     Functional Mobility  Functional - Mobility Device: Rolling Walker  Activity: Other  Assist Level: Contact guard assistance  Functional Mobility Comments: Pt demoed functional mobility 1 lap in room, steady pace, no LOB. Pt required cues for proper feet placement as occasionally had narrow SERGIO, BLE almost crossing during. Apparatus Needs: O2 (3L)     Activity Tolerance:  Activity Tolerance: Patient Tolerated treatment well, Patient limited by fatigue  Activity Tolerance: Pt declined further activity due to fatigue. Pt required minimal cues throughout for pursed lip breathing during mobility and transfers. Assessment:  Assessment: Pt presents requiring assistance for ADLs, mobility, and transfers with decreased overall activity tolerance s/p recent hospitalizations. Pt will continue to benefit from OT services to increase independence with these tasks to facilitate return to PLOF.    Performance deficits / Impairments: Decreased functional mobility , Decreased ADL status, Decreased strength, Decreased cognition, Decreased endurance, Decreased balance  Prognosis: Fair  Discharge Recommendations: Patient would

## 2018-01-15 NOTE — PROGRESS NOTES
Subcutaneous Nightly    insulin lispro  4 Units Subcutaneous TID WC    pantoprazole  40 mg Oral Daily    amiodarone  200 mg Oral Daily    aspirin  81 mg Oral Daily    atorvastatin  80 mg Oral Daily    clopidogrel  75 mg Oral Daily    beclomethasone  2 puff Inhalation BID    hydrALAZINE  10 mg Oral 4 times per day    lisinopril  20 mg Oral Daily    methenamine  1 g Oral BID WC    metoprolol  100 mg Oral BID    primidone  50 mg Oral Nightly    sodium chloride flush  10 mL Intravenous 2 times per day    enoxaparin  40 mg Subcutaneous Q24H    insulin lispro  0-12 Units Subcutaneous TID WC    insulin lispro  0-6 Units Subcutaneous Nightly    doxazosin  4 mg Oral Nightly      dextrose 5 % and 0.9 % NaCl         sodium chloride flush 10 mL PRN   acetaminophen 650 mg Q4H PRN   magnesium hydroxide 30 mL Daily PRN   ondansetron 4 mg Q6H PRN   glucose 15 g PRN   dextrose 12.5 g PRN   glucagon (rDNA) 1 mg PRN   dextrose 5 % and 0.9 % NaCl 100 mL/hr PRN   potassium chloride 40 mEq PRN   Or     potassium chloride 40 mEq PRN   Or     potassium chloride 10 mEq PRN   magnesium sulfate 1 g PRN   nitroGLYCERIN 0.4 mg Q5 Min PRN       Diagnostics:  EK2018 10:00:38 [facility] ROUTINE RETRIEVAL  Normal sinus rhythm  Right bundle branch block  Abnormal ECG  When compared with ECG of 2013 07:03,  Confirmed by Jannet Graham ([id number]) on 2018 4:12:59 PM    Echo:    Electronically signed by Madi Mcmahan MD (Interpreting   physician) on 2018 at 08:47 PM   ----------------------------------------------------------------      Findings      Mitral Valve   The mitral valve structure was normal with normal leaflet separation.   DOPPLER: The transmitral velocity was within the normal range with no   evidence for mitral stenosis.  There was mild mitral regurgitation.      Aortic Valve   The aortic valve was trileaflet with increased thickness and normal cuspal   separation.   No evidence of aortic stenosis. There was no evidence of aortic   regurgitation.      Tricuspid Valve   The tricuspid valve structure was normal with normal leaflet separation.   DOPPLER: There was no evidence of tricuspid stenosis. There was mild   tricuspid regurgitation.      Pulmonic Valve   The pulmonic valve was not well visualized .   Pulmonary artery systolic pressure calculated from tricuspid regurgitation   jet is 50 mmhg.      Left Atrium   Mildly dilated left atrium.      Left Ventricle   Normal left ventricle size and systolic function. Ejection fraction was   estimated at 60%.   Left Ventricular size is Mildly increased .   Doppler parameters were consistent with abnormal left ventricular   relaxation (grade 1 diastolic dysfunction).      Right Atrium   Moderately enlarged right atrium size.      Right Ventricle   Mildly dilated right ventricle.      Pericardial Effusion   The pericardium was normal in appearance with no evidence of a pericardial   effusion.      Pleural Effusion   No evidence of pleural effusion.      Aorta / Great Vessels   Aortic root dimension within normal limits.       Stress:[facility] 2014  Normal        Lab Data:    Cardiac Enzymes:  No results for input(s): CKTOTAL, CKMB, CKMBINDEX, TROPONINI in the last 72 hours.     CBC:   Lab Results   Component Value Date    WBC 10.6 01/15/2018    RBC 3.49 01/15/2018    HGB 11.1 01/15/2018    HCT 33.6 01/15/2018    PLT 82 01/15/2018       CMP:    Lab Results   Component Value Date     01/15/2018    K 3.5 01/15/2018    CL 93 01/15/2018    CO2 45 01/15/2018    BUN 26 01/15/2018    CREATININE 1.1 01/15/2018    AGRATIO 2.9 01/29/2015    LABGLOM 63 01/15/2018    GLUCOSE 92 01/15/2018    GLUCOSE 170 01/04/2018    CALCIUM 8.7 01/15/2018       Hepatic Function Panel:    Lab Results   Component Value Date    ALKPHOS 65 01/12/2018    ALT 21 01/12/2018    AST 14 01/12/2018    PROT 5.4 01/12/2018    BILITOT 0.4 01/12/2018    BILIDIR <0.2 01/12/2018    LABALBU 3.2

## 2018-01-15 NOTE — PLAN OF CARE
at this time. Patient has SOB with ambulation. Lung sounds are diminished and clear. Goal: O2 Sat > 90%  Outcome: Ongoing  Patient SpO2 > 90% on home dose of 3 L NC. Patient is to wear BiPAP at night and during naps. Problem:   Goal: Adequate urinary output  Outcome: Ongoing  Patient UO is adequate. Rhodes is draining well. Problem: Skin Integrity/Risk  Goal: No skin breakdown during hospitalization  Outcome: Ongoing  No skin breakdown thus far this shift. Patient has some skin tears. Patient is being turned and repositioned every 2 hours. Problem: Urinary Elimination:  Goal: Signs and symptoms of infection will decrease  Signs and symptoms of infection will decrease   Outcome: Ongoing  Patient s/s of infection are decreasing. Patient VS Stable. Vitals:    01/14/18 1617 01/14/18 2037 01/14/18 2047 01/14/18 2128   BP: 129/80 139/66     Pulse: 60 54 64 63   Resp: 20 20     Temp: 97.2 °F (36.2 °C) 98.3 °F (36.8 °C)     TempSrc: Oral Oral     SpO2: 92% 94%     Weight:       Height:         Goal: Complications related to the disease process, condition or treatment will be avoided or minimized  Complications related to the disease process, condition or treatment will be avoided or minimized   Outcome: Ongoing  Complications related to rhodes have been avoided thus far this shift. Comments: Care plan reviewed with patient. Patient verbalizes understanding of the plan of care and contribute to goal setting.

## 2018-01-15 NOTE — PLAN OF CARE
Problem: Falls - Risk of  Goal: Absence of falls  Outcome: Ongoing  No falls this shift. Patient is able to use call light at times. He is impulsive at times as well. Hourly rounding. Patient uses walker and gait belt with ambulation. Problem: Impaired respiratory status  Goal: Clear lung sounds  Outcome: Ongoing  Patient's lung sounds clear and diminished. Problem: DISCHARGE BARRIERS  Goal: Patient's continuum of care needs are met  Outcome: Ongoing  Patient and son are able to participate in discharge planning. Patient plans to go to [address] [place] [address] at time of discharge.  on patient's case. Problem: Pain Control  Goal: Maintain pain level at or below patient's acceptable level (or 5 if patient is unable to determine acceptable level)  Outcome: Ongoing  Patient has no complaints of pain this shift. Monitoring pain level with hourly rounding. Pain goal \"0\". Problem: Neurological  Goal: Maximum potential motor/sensory/cognitive function  Outcome: Ongoing  Patient is able to follow commands. He is alert and oriented X 4 so far this shift, but he is confused at times. Problem: Cardiovascular  Goal: No DVT, peripheral vascular complications  Outcome: Ongoing  No signs of DVT at this time. Patient on lovenox for DVT prophylaxis. Goal: Hemodynamic stability  Outcome: Ongoing  Patient has a pacemaker. He has swelling to bilateral legs and is on IV lasix. Problem: Respiratory  Goal: No pulmonary complications  Outcome: Ongoing  Patient's lung sounds clear and diminished. He wore his BIPAP most of the morning during his nap. He wears nasal cannula when off of the bipap machine. Goal: O2 Sat > 90%  Outcome: Ongoing  We are able to keep patient's oxygen level greater than 90% on nasal cannula. Encouraging patient to cough and deep breathe. Problem:   Goal: Adequate urinary output  Outcome: Ongoing  Patient is having adequate urinary output.  Patient has rhodes catheter to gravity. Urine is yellow and clear. Problem: Skin Integrity/Risk  Goal: No skin breakdown during hospitalization  Outcome: Ongoing  No new skin integrity issues this shift. Patient requires turning and repositioning in bed. Allevyn sacrum boarder applied. Barrier cream used on buttocks. Problem: Urinary Elimination:  Goal: Signs and symptoms of infection will decrease  Signs and symptoms of infection will decrease   Outcome: Ongoing  Patient has rhodes catheter to gravity. Urine is yellow and clear. Goal: Complications related to the disease process, condition or treatment will be avoided or minimized  Complications related to the disease process, condition or treatment will be avoided or minimized   Outcome: Ongoing  Patient and family updated on possible S/S of UTI. Comments: Care plan reviewed with patient and son. Patient and son verbalize understanding of the plan of care and contribute to goal setting.

## 2018-01-15 NOTE — PROGRESS NOTES
Hospitalist Progress Note    Patient:  Juan Carlos Guidry      Unit/Bed:4K-19/019-A    YOB: 1928    MRN: [id number]       Acct: [de-identified]     PCP: Tatyana Brar MD    Date of Admission: 1/12/2018    Chief Complaint:   Chief Complaint   Patient presents with    Shortness of Breath    Cough    Altered Mental Status     intermittent         Subjective:   Mental status seems to be improving. Intermittent dyspnea with exertion. Medications:  Reviewed    Infusion Medications    dextrose 5 % and 0.9 % NaCl       Scheduled Medications    ipratropium-albuterol  1 ampule Inhalation Q6H WA    furosemide  60 mg Intravenous BID    insulin glargine  12 Units Subcutaneous Nightly    insulin lispro  4 Units Subcutaneous TID WC    pantoprazole  40 mg Oral Daily    amiodarone  200 mg Oral Daily    aspirin  81 mg Oral Daily    atorvastatin  80 mg Oral Daily    clopidogrel  75 mg Oral Daily    beclomethasone  2 puff Inhalation BID    hydrALAZINE  10 mg Oral 4 times per day    lisinopril  20 mg Oral Daily    methenamine  1 g Oral BID WC    metoprolol  100 mg Oral BID    primidone  50 mg Oral Nightly    sodium chloride flush  10 mL Intravenous 2 times per day    enoxaparin  40 mg Subcutaneous Q24H    insulin lispro  0-12 Units Subcutaneous TID WC    insulin lispro  0-6 Units Subcutaneous Nightly    doxazosin  4 mg Oral Nightly     PRN Meds: sodium chloride flush, acetaminophen, magnesium hydroxide, ondansetron, glucose, dextrose, glucagon (rDNA), dextrose 5 % and 0.9 % NaCl, potassium chloride **OR** potassium chloride **OR** potassium chloride, magnesium sulfate, nitroGLYCERIN      Intake/Output Summary (Last 24 hours) at 01/15/18 1540  Last data filed at 01/15/18 1445   Gross per 24 hour   Intake             1190 ml   Output             2175 ml   Net             -985 ml       Diet:  DIET CARDIAC;     Exam:  BP (!) 102/54   Pulse 60   Temp 98.1 °F (36.7 °C) (Oral)   Resp 18   Ht 6' effusions. Bones are osteopenic. AICD over the right chest with remote AICD leads extending from the left chest.     Left upper lobe atelectasis, or infiltrate infiltrate or postobstructive atelectasis. 2. Probably small bilateral pleural effusions versus pleural thickening. 3. Large right apical emphysematous bleb. **This report has been created using voice recognition software. It may contain minor errors which are inherent in voice recognition technology. ** Final report electronically signed by Dr. Rosario  on 1/14/2018 1:56 PM    Ct Head Wo Contrast    Result Date: 1/12/2018  PROCEDURE: CT HEAD WO CONTRAST CLINICAL INFORMATION: AMS, history of lung cancer COMPARISON: 4/19/2013 TECHNIQUE:  Axial CT images were obtained through the head without contrast.. All CT scans at this facility use dose modulation, iterative reconstruction, and/or weight-based dosing when appropriate to reduce radiation dose to as low as reasonably achievable. FINDINGS: There is a focal area of subcortical white matter low density demonstrated on axial image 25 which measures approximately 1.5 x 1.7 cm. Previously this measured approximately 1.1 x 0.8 cm on prior examination. This may represent an age-indeterminate focal infarct. There is mild scattered patchy periventricular and subcortical white matter hypoattenuation otherwise demonstrated which may be related to underlying chronic small vessel ischemic disease. No acute intracranial hemorrhage  or mass effect is seen. There is no midline shift. There is normal size and configuration of the cerebral sulci and lateral ventricles. Moderate mucosal thickening within the visualized maxillary sinuses is noted. Parasellar carotid artery calcification  is noted. The mastoid air cells appear clear. No acute abdomen on these of the calvarium are seen. Vertebral artery calcification is noted. 1. No acute intracranial hemorrhage or mass effect.  2. Mild patchy periventricular and subcortical white matter hypoattenuation is demonstrated likely representing sequela from mild chronic small vessel ischemic disease. However, there is a more focal area of low density demonstrated within the subcortical white matter of the right parietal lobe which appears more prominent when compared to prior examination from April 2013. This may represent an age-indeterminate infarct. **This report has been created using voice recognition software. It may contain minor errors which are inherent in voice recognition technology. ** Final report electronically signed by Dr. Cedric Baez on 1/12/2018 11:22 AM    Cta Chest W Wo Contrast    Result Date: 1/12/2018  PROCEDURE: CTA CHEST W WO CONTRAST CLINICAL INFORMATION: Shortness of breath, cough TECHNIQUE: CTA of the chest was performed following administration of 85 mL Isovue-370 intravenous contrast. Axial images as well as sagittal and bilateral oblique pulmonary artery reconstructions were obtained. All CT scans at this facility use dose modulation, iterative reconstruction, and/or weight-based dosing when appropriate to reduce radiation dose to as low as reasonably achievable. COMPARISON: CT chest 10/30/2017 FINDINGS: There are no filling defects or lack of contrast opacification in the visualized pulmonary arteries to suggest thromboembolism. The main pulmonary artery is enlarged, again measuring approximately 3.5 cm (image 81). Cardiac size is at the upper limits of normal. Atherosclerotic calcifications are present in the thoracic aorta and coronary arteries. 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 There is no pericardial effusion. Moderate bilateral pleural effusions are present. There are areas of adjacent lung consolidation. Fibrotic and emphysematous changes are noted in the bilateral lungs.  A previously identified subpleural masslike density in the anterior left upper lung now optimization techniques were used for this CT.    COMPARISON:  January 1, 2015  ___________________________________    FINDINGS:  There is motion artifact. There is limited enhancement of the main pulmonary artery and right and  left pulmonary arteries. There is limited enhancement of the bilateral  peripheral pulmonary arteries. There is no demonstrated pulmonary  embolism. There is atherosclerotic calcification of the aortic arch with tortuosity. There is no demonstrated aortic dissection. There is stable borderline cardiac megaly. Normal mediastinum. Normal hilar regions. Normal visualized trachea and bronchi. There are much larger pleural effusions, both small to moderate in size. The previously seen left upper lobe mass has enlarged. It extends more  into the anterolateral chest wall. It measures 4.8 x 2.8 cm. There is  consolidation of the left upper lobe inferior to the mass. Again seen are  emphysematous changes of lungs. There is a new anterior left  cardiophrenic angle enlarged lymph node, mass or consolidation. There are degenerative changes of thoracic spine. There is a stable small hiatal hernia. Again seen are multiple  gallstones. There is stable bilateral adrenal nodules. Again seen are  right renal calculi. There is a small right renal cyst, not imaged  previously. Again seen is colonic diverticulosis. ___________________________________    IMPRESSION:  No demonstrated pulmonary embolism or aortic dissection. The previously seen left upper lobe mass has enlarged. There is  consolidation of the left upper lobe inferior to the mass. There is a new left cardiophrenic angle enlarged lymph node, mass or  consolidation. There are much larger pleural effusions, both small to moderate in size. Electronically Signed:  Yanna Wu MD  2018/01/04 at 14:57 EST  Tel [phone] , Service support  [phone], Fax [phone]          cc:  Cedric Luna Edilson Stephenson MD      Dictated by:  Yoselyn Garner MD on 01/04/18 1324  Technologist:  Nisreen Marshall RT(R)(M)  Transcribed by:  Yoselyn Garner on 01/04/18 1267    Report Signed by:  Arely Waldron on 01/04/18 1457      Xr Chest Portable    Result Date: 1/12/2018  PROCEDURE: XR CHEST PORTABLE CLINICAL INFORMATION: Status post US guided right thoracentesis,  . COMPARISON: 1/12/2018 TECHNIQUE: Portable semiupright FINDINGS: No pneumothorax post thoracentesis marked improved aeration of the right lower lobe. No pneumothorax post thoracentesis **This report has been created using voice recognition software. It may contain minor errors which are inherent in voice recognition technology. ** Final report electronically signed by Dr. Lyn Anglin on 1/12/2018 3:51 PM    Xr Chest 1 Vw    Result Date: 1/12/2018  PROCEDURE: XR CHEST LIMITED ONE VIEW CLINICAL INFORMATION: Cough, shortness of breath TECHNIQUE: 2 AP upright chest radiographs COMPARISON: PA and lateral chest radiographs FINDINGS: A right-sided cardiac device is in stable position. Median sternotomy wires are present in the mid thorax. There is mild stable enlargement of the cardiac silhouette. Atherosclerotic calcifications are present in the thoracic aorta. Pulmonary interstitium is prominent. There is blunting of the bilateral costophrenic angles. Hazy reticular opacities are present in the left upper, right mid and bilateral lower lungs. Degenerative changes are seen in the thoracic spine. Soft tissues are unremarkable. 1. Small bilateral pleural effusions with adjacent atelectasis/infiltrate. 2. Left upper and right midlung atelectasis/infiltrate. 3. Probable pulmonary edema. 4. Mild stable cardiomegaly. Final report electronically signed by Dr. Rodrigo Roque on 1/12/2018 11:36 AM    Us Thoracentesis    Result Date: 1/12/2018  PROCEDURE: US THORACENTESIS CLINICAL INFORMATION: Pleural effusions. COMPARISON: No prior study.  PHYSICIAN PERFORMING new small right pleural effusion. There is stable cardiac megaly. Normal mediastinum and ruperto. Normal  visualized pulmonary arteries. There is atherosclerotic calcification of  the aortic arch with tortuosity. Normal visualized thoracic spine. There is a stable old right rib  fracture. There is no demonstrated abnormality of the visualized soft tissue  structures of the upper abdomen.  ___________________________________    IMPRESSION:  There is new right lower lobe consolidation. There is a denser bandlike consolidation of the left upper lobe. There is a new small right pleural effusion. Electronically Signed:  Magen Graff MD  2018/01/04 at 12:31 EST  Tel [phone] , Service support  [phone], Fax [phone]          cc: Lady Hilda TERESA; Micki Rivera MD      Dictated by:  Mitra Bosch MD on 01/04/18 1212  Technologist:    Transcribed by:  Mitra Bosch on 01/04/18 1231    Report Signed by:  Ramesh Vee on 01/04/18 1231        Diet: DIET CARDIAC; Disposition:    [] Home       [] TCU       [] Rehab       [] Psych       [] SNF       [] [place]       [] Other-    Code Status: Limited    Assessment/Plan:    Active Hospital Problems    Diagnosis Date Noted    Pneumonia due to organism [J18.9]      Priority: High    Coronary artery disease involving native coronary artery of native heart without angina pectoris [I25.10]      Priority: High    Acute encephalopathy [G93.40]     Dyspnea [R06.00] 07/30/2012     Assessment:         1. Acute on chronic hypoxic, hypercapnic respiratory failure,  Improving. 2. Acute on chronic diastolic CHF  3. Bilateral pleural effusion with compressive atelectasis  status post right-sided thoracentesis removal of 1200ml   Consistent with transudate  4. Elevated troponin without chest pain, likely demand ischemia  5. Acute toxic metabolic encephalopathy, improving. CT head finding noted   6.  Hypertensive urgency, improving.

## 2018-01-15 NOTE — PLAN OF CARE
Problem:   Goal: Adequate urinary output  Outcome: Met This Shift  Adequate output with lasix. Problem: Skin Integrity/Risk  Goal: No skin breakdown during hospitalization  Outcome: Ongoing  No new skin breakdown. Pt has skin tears to arms. Problem: Urinary Elimination:  Goal: Signs and symptoms of infection will decrease  Signs and symptoms of infection will decrease   Outcome: Ongoing  No signs of infection from rhodes. Goal: Complications related to the disease process, condition or treatment will be avoided or minimized  Complications related to the disease process, condition or treatment will be avoided or minimized   Outcome: Ongoing  No complications from rhodes. Comments: Care plan reviewed with patient. Patient verbalize understanding of the plan of care and contribute to goal setting.

## 2018-01-16 NOTE — PROGRESS NOTES
Intravenous, PRN  glucagon (rDNA) injection 1 mg, 1 mg, Intramuscular, PRN  dextrose 5 % and 0.9 % sodium chloride infusion, 100 mL/hr, Intravenous, PRN  potassium chloride (KLOR-CON M) extended release tablet 40 mEq, 40 mEq, Oral, PRN **OR** potassium chloride 20 MEQ/15ML (10%) oral solution 40 mEq, 40 mEq, Oral, PRN **OR** potassium chloride 10 mEq/100 mL IVPB (Peripheral Line), 10 mEq, Intravenous, PRN  magnesium sulfate 1 g in dextrose 5 % 100 mL IVPB, 1 g, Intravenous, PRN  nitroGLYCERIN (NITROSTAT) SL tablet 0.4 mg, 0.4 mg, Sublingual, Q5 Min PRN  insulin lispro (HUMALOG) injection vial 0-12 Units, 0-12 Units, Subcutaneous, TID WC  insulin lispro (HUMALOG) injection vial 0-6 Units, 0-6 Units, Subcutaneous, Nightly  doxazosin (CARDURA) tablet 4 mg, 4 mg, Oral, Nightly  Physical    VITALS:  BP (!) 146/62   Pulse 60   Temp 98.1 °F (36.7 °C) (Oral)   Resp 26   Ht 6' (1.829 m)   Wt 239 lb 1.6 oz (108.5 kg)   SpO2 100%   BMI 32.43 kg/m²   TEMPERATURE:  Current - Temp: 98.1 °F (36.7 °C);  Max - Temp  Av.2 °F (36.8 °C)  Min: 97.5 °F (36.4 °C)  Max: 99.4 °F (37.4 °C)  RESPIRATIONS RANGE: Resp  Av  Min: 12  Max: 26  PULSE RANGE: Pulse  Av  Min: 58  Max: 70  BLOOD PRESSURE RANGE:  Systolic (55CWE), JXZ:551 , Min:102 , TMB:606   ; Diastolic (61DZH), XQR:96, Min:54, Max:85    PULSE OXIMETRY RANGE: SpO2  Av %  Min: 92 %  Max: 100 %  24HR INTAKE/OUTPUT:    Intake/Output Summary (Last 24 hours) at 01/15/18 2135  Last data filed at 01/15/18 2022   Gross per 24 hour   Intake             1280 ml   Output             1950 ml   Net             -670 ml     CONSTITUTIONAL:  awake, alert, cooperative, no apparent distress, and appears stated age  EYES:  Lids and lashes normal, pupils equal, round and reactive to light, extra ocular muscles intact, sclera clear, conjunctiva normal  ENT:  Normocephalic, without obvious abnormality, atraumatic, sinuses nontender on palpation, external ears without lesions, oral

## 2018-01-16 NOTE — PROGRESS NOTES
radiation therapy  · morbid obesity Body mass index is 32.44 kg/m². · History of prostatic hyperplasia  · History of  CLL with Thrombocytopenia  ·  Diabetes mellitus type 2        Plan:  1. Hold lasix for now  2. Monitor Cr and lytes  3. Continue respiratory care protocol  4. Intermittent BiPAP  5. Glycemic control  6.  PT/OT    Electronically signed by Job Pate MD on 1/16/2018 at 3:26 PM

## 2018-01-16 NOTE — PROGRESS NOTES
CKTOTAL, CKMB, CKMBINDEX, TROPONINI in the last 72 hours.     CBC:   Lab Results   Component Value Date    WBC 9.9 01/16/2018    RBC 3.45 01/16/2018    HGB 11.0 01/16/2018    HCT 33.6 01/16/2018    PLT 82 01/16/2018       CMP:  Lab Results   Component Value Date     01/16/2018    K 3.9 01/16/2018    CL 95 01/16/2018    CO2 42 01/16/2018    BUN 32 01/16/2018    CREATININE 1.4 01/16/2018    AGRATIO 2.9 01/29/2015    LABGLOM 48 01/16/2018    GLUCOSE 121 01/16/2018    GLUCOSE 170 01/04/2018    CALCIUM 8.6 01/16/2018       Hepatic Function Panel:  Lab Results   Component Value Date    ALKPHOS 65 01/12/2018    ALT 21 01/12/2018    AST 14 01/12/2018    PROT 5.4 01/12/2018    BILITOT 0.4 01/12/2018    BILIDIR <0.2 01/12/2018    LABALBU 3.2 01/16/2018       Magnesium:    Lab Results   Component Value Date    MG 2.2 01/13/2018       PT/INR:    Lab Results   Component Value Date    PROTIME 11.4 01/04/2018    INR 1.01 01/12/2018       HgBA1c:    Lab Results   Component Value Date    LABA1C 6.5 01/12/2018       FLP:  Lab Results   Component Value Date    TRIG 128 07/03/2017    HDL 44 10/30/2017    LDLCALC 36 10/30/2017       TSH:    Lab Results   Component Value Date    TSH 1.520 01/12/2018         Assessment:    AMS - improving  Acute on chronic hypoxemic and hypercapnic resp failure - pulmonary following  Bilateral pleural effusions - s/p right thoracentesis  Lung cancer - undergoing radiation treatment  Acute Diastolic CHF/RHF - improving  Hx CAD - s/p CABG  Hx PPM  DM  Ef 60 with grade I DDfx per echo 1/13/18  Hx PVCs - on amio - followed by dr Bird Ring:  · Daily I/o and weights  · 2 liter fluid restriction and 2gm sodium diet  · Cont asa/plavix/statin/bb/ace/amio  · Will see prn  · F/up dr Cecilio Pickett 1 week         Electronically signed by River Rebollar PA-C on 1/16/2018 at 2:51 PM

## 2018-01-16 NOTE — PROGRESS NOTES
injection 1 mg, 1 mg, Intramuscular, PRN  dextrose 5 % and 0.9 % sodium chloride infusion, 100 mL/hr, Intravenous, PRN  potassium chloride (KLOR-CON M) extended release tablet 40 mEq, 40 mEq, Oral, PRN **OR** potassium chloride 20 MEQ/15ML (10%) oral solution 40 mEq, 40 mEq, Oral, PRN **OR** potassium chloride 10 mEq/100 mL IVPB (Peripheral Line), 10 mEq, Intravenous, PRN  magnesium sulfate 1 g in dextrose 5 % 100 mL IVPB, 1 g, Intravenous, PRN  nitroGLYCERIN (NITROSTAT) SL tablet 0.4 mg, 0.4 mg, Sublingual, Q5 Min PRN  insulin lispro (HUMALOG) injection vial 0-12 Units, 0-12 Units, Subcutaneous, TID WC  insulin lispro (HUMALOG) injection vial 0-6 Units, 0-6 Units, Subcutaneous, Nightly  doxazosin (CARDURA) tablet 4 mg, 4 mg, Oral, Nightly  Physical    VITALS:  BP (!) 112/56   Pulse 60   Temp 98.6 °F (37 °C) (Oral)   Resp 16   Ht 6' (1.829 m)   Wt 239 lb 1.6 oz (108.5 kg)   SpO2 95%   BMI 32.43 kg/m²   TEMPERATURE:  Current - Temp: 98.6 °F (37 °C);  Max - Temp  Av.2 °F (36.8 °C)  Min: 97.6 °F (36.4 °C)  Max: 98.6 °F (37 °C)  RESPIRATIONS RANGE: Resp  Av.8  Min: 12  Max: 26  PULSE RANGE: Pulse  Av.6  Min: 60  Max: 65  BLOOD PRESSURE RANGE:  Systolic (13RMO), JTY:509 , Min:102 , JASON:330   ; Diastolic (75AXB), EDEN:78, Min:50, Max:72    PULSE OXIMETRY RANGE: SpO2  Av.4 %  Min: 92 %  Max: 100 %  24HR INTAKE/OUTPUT:    Intake/Output Summary (Last 24 hours) at 18 5088  Last data filed at 18 1438   Gross per 24 hour   Intake             1460 ml   Output              725 ml   Net              735 ml     CONSTITUTIONAL:  awake, alert, cooperative, no apparent distress, and appears stated age  EYES:  Lids and lashes normal, pupils equal, round and reactive to light, extra ocular muscles intact, sclera clear, conjunctiva normal  ENT:  Normocephalic, without obvious abnormality, atraumatic, sinuses nontender on palpation, external ears without lesions, oral pharynx with moist mucus membranes, tonsils without erythema or exudates, gums normal and good dentition.   NECK:  Supple, symmetrical, trachea midline, no adenopathy, thyroid symmetric, not enlarged and no tenderness, skin normal  HEMATOLOGIC/LYMPHATICS:  no cervical lymphadenopathy  BACK:  Symmetric, no curvature, spinous processes are non-tender on palpation, paraspinous muscles are non-tender on palpation, no costal vertebral tenderness  LUNGS:  No increased work of breathing, good air exchange, clear to auscultation bilaterally, no crackles or wheezing - decreased sounds  CARDIOVASCULAR:  Normal apical impulse, regular rate and rhythm, normal S1 and S2, no S3 or S4, and no murmur noted  ABDOMEN:  No scars, normal bowel sounds, soft, non-distended, non-tender, no masses palpated, no hepatosplenomegally  MUSCULOSKELETAL:  There is no redness, warmth, or swelling of the joints.  Full range of motion noted.  Motor strength is 5 out of 5 all extremities bilaterally.  Tone is normal.  NEUROLOGIC:  Awake, alert, oriented to name but confused to time and location.  Cranial nerves II-XII are grossly intact.  Motor is 5 out of 5 bilaterally.  Cerebellar finger to nose, heel to shin intact.  Sensory is intact.  Babinski down going, Romberg negative, and gait is normal.  SKIN:  no bruising or bleeding  Data    CBC:   Lab Results   Component Value Date    WBC 9.9 01/16/2018    RBC 3.45 01/16/2018    HGB 11.0 01/16/2018    HCT 33.6 01/16/2018    MCV 97.5 01/16/2018    MCH 32.0 01/16/2018    MCHC 32.8 01/16/2018    RDW 16.2 01/16/2018    PLT 82 01/16/2018    MPV 8.1 01/16/2018     CMP:    Lab Results   Component Value Date     01/16/2018    K 3.9 01/16/2018    CL 95 01/16/2018    CO2 42 01/16/2018    BUN 32 01/16/2018    CREATININE 1.4 01/16/2018    AGRATIO 2.9 01/29/2015    LABGLOM 48 01/16/2018    GLUCOSE 121 01/16/2018    GLUCOSE 170 01/04/2018    PROT 5.4 01/12/2018    LABALBU 3.2 01/16/2018    CALCIUM 8.6 01/16/2018    BILITOT 0.4

## 2018-01-17 NOTE — PROGRESS NOTES
Resp 18   Ht 6' (1.829 m)   Wt 241 lb 12.8 oz (109.7 kg)   SpO2 96%   BMI 32.79 kg/m²     Gen/overall appearance: Not in acute distress. Alert. Elderly  Head: Normocephalic, atraumatic  Eyes: EOMI, no scleral icterus  CVS: regular rate and rhythm, Normal S1S2  Pulm: Clear to auscultation bilaterally. Diminished  Gastrointestinal: Soft, nontender, nondistended, no guarding or rebound  Extremities: trace edema. No erythema or warmth  Neuro: No gross focal deficits noted  Skin: Warm, dry    Labs:   Recent Labs      01/15/18   0502  01/16/18   0433  01/17/18   0405   WBC  10.6  9.9  10.5   HGB  11.1*  11.0*  9.9*   HCT  33.6*  33.6*  29.9*   PLT  82*  82*  73*     Recent Labs      01/15/18   0502  01/16/18   0433  01/17/18   0405   NA  143  143  138   K  3.5  3.9  3.7   CL  93*  95*  92*   CO2  45*  42*  38*   BUN  26*  32*  41*   CREATININE  1.1  1.4*  1.8*   CALCIUM  8.7  8.6  8.1*   PHOS  2.7  3.0  2.9     No results for input(s): AST, ALT, BILIDIR, BILITOT, ALKPHOS in the last 72 hours. No results for input(s): INR in the last 72 hours. No results for input(s): Randy  in the last 72 hours. Urinalysis:      Lab Results   Component Value Date    NITRU NEGATIVE 01/12/2018    WBCUA NONE 01/12/2018    WBCUA 0-5 01/02/2015    BACTERIA NONE 01/12/2018    RBCUA 3-5 01/12/2018    BLOODU NEGATIVE 01/12/2018    SPECGRAV 1.020 01/23/2014    SPECGRAV 1.010 10/19/2013    GLUCOSEU NEGATIVE 01/12/2018       Radiology:  Xr Chest Standard (2 Vw)    Result Date: 1/14/2018  PROCEDURE: XR CHEST STANDARD TWO VW CLINICAL INFORMATION: chf follow up,  . COMPARISON: 1/12/2018 TECHNIQUE: PA and lateral FINDINGS: Heart size is within normal limits. Mediastinum appears mildly prominent aorta is atherosclerotic. Left upper lobe atelectasis or infiltrate. Perihilar density contiguous with the atelectasis. . Large emphysematous bleb right apex.  Blunting of the posterior costophrenic angles related to pleural thickening and/or and subcortical white matter hypoattenuation is demonstrated likely representing sequela from mild chronic small vessel ischemic disease. However, there is a more focal area of low density demonstrated within the subcortical white matter of the right parietal lobe which appears more prominent when compared to prior examination from April 2013. This may represent an age-indeterminate infarct. **This report has been created using voice recognition software. It may contain minor errors which are inherent in voice recognition technology. ** Final report electronically signed by Dr. Domonique Ramos on 1/12/2018 11:22 AM    Cta Chest W Wo Contrast    Result Date: 1/12/2018  PROCEDURE: CTA CHEST W WO CONTRAST CLINICAL INFORMATION: Shortness of breath, cough TECHNIQUE: CTA of the chest was performed following administration of 85 mL Isovue-370 intravenous contrast. Axial images as well as sagittal and bilateral oblique pulmonary artery reconstructions were obtained. All CT scans at this facility use dose modulation, iterative reconstruction, and/or weight-based dosing when appropriate to reduce radiation dose to as low as reasonably achievable. COMPARISON: CT chest 10/30/2017 FINDINGS: There are no filling defects or lack of contrast opacification in the visualized pulmonary arteries to suggest thromboembolism. The main pulmonary artery is enlarged, again measuring approximately 3.5 cm (image 81). Cardiac size is at the upper limits of normal. Atherosclerotic calcifications are present in the thoracic aorta and coronary arteries. 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 There is no pericardial effusion. Moderate bilateral pleural effusions are present. There are areas of adjacent lung consolidation. Fibrotic and emphysematous changes are noted in the bilateral lungs.  A previously identified subpleural masslike density in the anterior left upper lung now measures approximately 5.6 x 2.6 cm (image 49; prior measurement 5.8 x 2.6 cm). There is an additional masslike density in the lingula measuring approximately 3.0 x 2.5 cm (image 79). There is no mediastinal, hilar or axillary lymphadenopathy. Degenerative changes are seen in the thoracic spine without evidence of aggressive osseous lesions. A 3.6 cm hypoattenuating mass in the right adrenal gland is stable (image 198). A 2.7 cm left adrenal mass is also stable (image 198). There is a probable splenule anterior to the spleen. 1. No CT evidence of pulmonary embolism. 2. Enlarged main pulmonary artery suspicious for pulmonary arterial hypertension. 3. Moderate bilateral pleural effusions with adjacent atelectasis/infiltrate. 4. Slightly improved subpleural masslike density in the anterior left upper lung. 5. New masslike density in the lingula of indeterminate etiology. Malignancy cannot be excluded. 6. Stable masses in the bilateral adrenal glands. Final report electronically signed by Dr. Tasha Cuevas on 2018 11:24 AM    Cta Chest W Wo Contrast    Result Date: 2018  Ordering Provider: [address] [place] [address]    Radiology Department  Patient:  Nirav Layton [facility]. :  1928   Sex: [place], [address] [facility]  Location:  [place] [address]   Unit #:   [id number]    [facility] #:  [de-identified]    Ordering Phys: Izabel Gilbert PA-C      Exam Date: 18  Accession #:  [id number]  Exam:  CT   CT Angio Chest W/WO 21035  Result:     STUDY:  CTA CHEST    REASON FOR EXAM:  Male, 80years old. dyspnea. LUNG CA DX IN ,  COPD. RADIATION DOSAGE (If Supplied By Facility):  CTDIvol = ( ) mGy, DLP = ( )  mGycm    TECHNIQUE:  The examination was performed with the intravenous  administration of 100 ml of Omnipaque 350 contrast material.  Post-processing of the angiographic images was performed, with multiplanar  reformation and 3D reconstruction. Individualized dose optimization techniques were used for this CT.    COMPARISON:  January 1, 2015  ___________________________________    FINDINGS:  There is motion artifact. There is limited enhancement of the main pulmonary artery and right and  left pulmonary arteries. There is limited enhancement of the bilateral  peripheral pulmonary arteries. There is no demonstrated pulmonary  embolism. There is atherosclerotic calcification of the aortic arch with tortuosity. There is no demonstrated aortic dissection. There is stable borderline cardiac megaly. Normal mediastinum. Normal hilar regions. Normal visualized trachea and bronchi. There are much larger pleural effusions, both small to moderate in size. The previously seen left upper lobe mass has enlarged. It extends more  into the anterolateral chest wall. It measures 4.8 x 2.8 cm. There is  consolidation of the left upper lobe inferior to the mass. Again seen are  emphysematous changes of lungs. There is a new anterior left  cardiophrenic angle enlarged lymph node, mass or consolidation. There are degenerative changes of thoracic spine. There is a stable small hiatal hernia. Again seen are multiple  gallstones. There is stable bilateral adrenal nodules. Again seen are  right renal calculi. There is a small right renal cyst, not imaged  previously. Again seen is colonic diverticulosis. ___________________________________    IMPRESSION:  No demonstrated pulmonary embolism or aortic dissection. The previously seen left upper lobe mass has enlarged. There is  consolidation of the left upper lobe inferior to the mass. There is a new left cardiophrenic angle enlarged lymph node, mass or  consolidation. There are much larger pleural effusions, both small to moderate in size.     Electronically Signed:  Yong Sahu MD  2018/01/04 at 14:57 EST  Tel [phone] , Service support  [phone], Fax [phone] There is a new small right pleural effusion. There is stable cardiac megaly. Normal mediastinum and ruperto. Normal  visualized pulmonary arteries. There is atherosclerotic calcification of  the aortic arch with tortuosity. Normal visualized thoracic spine. There is a stable old right rib  fracture. There is no demonstrated abnormality of the visualized soft tissue  structures of the upper abdomen.  ___________________________________    IMPRESSION:  There is new right lower lobe consolidation. There is a denser bandlike consolidation of the left upper lobe. There is a new small right pleural effusion. Electronically Signed:  Yuri Ward MD  2018/01/04 at 12:31 EST  Tel [phone] , Service support  [phone], Fax [phone]          cc: Rolando Buchanan PA-C; Tracy Forbes MD      Dictated by:  Virginia Ulloa MD on 01/04/18 1212  Technologist:    Transcribed by:  Virginia Ulloa on 01/04/18 1231    Report Signed by:  Wiley Gonzales on 01/04/18 1231        Diet: DIET CARDIAC; Disposition:    [x] Home       [] TCU       [] Rehab       [] Psych       [x] SNF       [] [place]       [] Other-    Code Status: Limited    Assessment/Plan:    Active Hospital Problems    Diagnosis Date Noted    Pneumonia due to organism [J18.9]      Priority: High    Coronary artery disease involving native coronary artery of native heart without angina pectoris [I25.10]      Priority: High    Acute encephalopathy [G93.40]     Dyspnea [R06.00] 07/30/2012     Assessment:         1. Acute on chronic hypoxic, hypercapnic respiratory failure,  Improving. 2. Acute on chronic diastolic CHF  3. Bilateral pleural effusion with compressive atelectasis  status post right-sided thoracentesis removal of 1200ml   Consistent with transudate  4. Elevated troponin without chest pain, likely demand ischemia  5. Acute toxic metabolic encephalopathy, improving. CT head finding noted   6.  Hypertensive urgency, improving.         comorbidities:     · Lung cancer undergoing radiation therapy  · morbid obesity Body mass index is 32.44 kg/m². · History of prostatic hyperplasia  · History of  CLL with Thrombocytopenia  ·  Diabetes mellitus type 2        Plan:  1. Hold lasix  2. Gentle IVF hydration  3. Monitor Cr and lytes  4. Continue respiratory care protocol  5. Intermittent BiPAP  6. Glycemic control  7.  PT/OT    Electronically signed by Butler Blizzard, MD on 1/17/2018 at 5:10 PM

## 2018-01-17 NOTE — FLOWSHEET NOTE
01/16/18 2205   Provider Notification   Reason for Communication Evaluate   Provider Name THE [facility]    Provider Notification Nurse Practitioner   Method of Communication Secure Message   Response Waiting for response   Notification Time 30-34-03-64- Patient has Lopressor 100 mg due. Patient BP is 106/51 and heart rate is 67 bpm. Can I get parameters on this medication? Thanks!! Waiting for response. [place] placed parameters on BP medication. See orders.

## 2018-01-17 NOTE — PLAN OF CARE
Problem: Falls - Risk of  Goal: Absence of falls  Outcome: Ongoing  Patient has not had a fall thus far this shift. Patient gets up with one assist and a walker. Patient is alert and oriented x 4. Patient has been educated on fall precautions and to not get up without assistance. Patient verbalized understanding. Problem: DISCHARGE BARRIERS  Goal: Patient's continuum of care needs are met  Outcome: Ongoing  Patient needs are being met plan is for discharge to [facility]. Problem: Pain Control  Goal: Maintain pain level at or below patient's acceptable level (or 5 if patient is unable to determine acceptable level)  Outcome: Ongoing  Patient denies pain thus far this shift. Problem: Neurological  Goal: Maximum potential motor/sensory/cognitive function  Outcome: Ongoing  Patient has full sensation. He ambulates well with one assist and patient is alert and oriented. Problem: Cardiovascular  Goal: No DVT, peripheral vascular complications  Outcome: Ongoing  Patient is not showing signs of DVT. Patient has no calf pain, tenderness, or redness. Patient is receiving lovenox as DVT prophylaxis. Goal: Hemodynamic stability  Outcome: Ongoing  Patient's capillary refill is less than 3 seconds and skin turgor is brisk. Skin color is appropriate for ethnicity and skin temperature is warm. VS are stable. BP is a little low, Dr. Marycruz Bermudez is aware. Problem: Respiratory  Goal: No pulmonary complications  Outcome: Ongoing  No pulm complications thus far this shift. Patient lung sounds clear and diminished. Patient SpO2 > 90%. Goal: O2 Sat > 90%  Outcome: Ongoing  Patient SpO2 > 90% on Bipap and titrated down to 2 L NC. Problem:   Goal: Adequate urinary output  Outcome: Ongoing  Urine output is low this shift. Dr. Marycruz Bermudez aware. Will continue to encourage fluids. Problem: Skin Integrity/Risk  Goal: No skin breakdown during hospitalization  Outcome: Ongoing  No skin breakdown thus far this shift.

## 2018-01-17 NOTE — PROGRESS NOTES
Physical Therapy   [facility] ICU STEPDOWN TELEMETRY 4K    Time In: 4516  Time Out: 1107  Timed Code Treatment Minutes: 21 Minutes  Minutes: 21          Date: 2018  Patient Name: Adam Seo,  Gender:  male        MRN: [id number]  : 1928  (80 y.o.)     Referring Practitioner: Dr. Leora Quan  Diagnosis: dyspnea  Additional Pertinent Hx: Per ED note on 18: Adam Seo is a 80 y.o. male with a history of left upper lobe carcinoma with consolidations and plural effusions who presents to the Emergency Department for the evaluation of shortness of breath and a cough. The patient is sent from the nursing home via EMS with a low pulse oxygen level (80% on 4L O2). He was recently diagnosed with pneumonia which is being treated. The nursing home reported that the patient was confused last night. He denies any other signs or symptoms at this time. The patient has not yet been treated for his cancer, and reports that his current cancer is fairly new but states he had cancer before and was in remission.   had thoracentesis for R pleural effusion     Past Medical History:   Diagnosis Date    Acute and chronic respiratory failure with hypoxia (HCC)     Acute respiratory distress syndrome (ARDS) (HCC)     Adrenal disease (HCC)     Dr Cb Walker Adrenal tumor 2012    Atherosclerotic heart disease of native coronary artery with angina pectoris (HCC)     Atrial fibrillation (HCC)     BPH (benign prostatic hyperplasia) 2012    CAD (coronary artery disease)     Dr Berny Phillip Cardiac pacemaker     Cavernous hemangioma     of liver, history of    Cerebrovascular accident ([address]     CHF (congestive heart failure) ([address] 2012    Dr Dawna Juan    Chronic lymphocytic leukemia of B-cell type ([address]     Chronic sinusitis     CLL (chronic lymphocytic leukemia) ([address] 2012    Dr Cecelia Cooper COPD (chronic obstructive pulmonary disease)

## 2018-01-17 NOTE — PLAN OF CARE
Problem: Skin Integrity/Risk  Goal: No skin breakdown during hospitalization  Outcome: Ongoing  No new skin integrity issues this shift. Patient requires turning and repositioning in bed. Allevyn sacrum boarder applied. Barrier cream used on buttocks.      Problem: Urinary Elimination:  Goal: Signs and symptoms of infection will decrease  Signs and symptoms of infection will decrease   Outcome: Ongoing  Patient has rhodes catheter to gravity. Urine is yellow and clear. Goal: Complications related to the disease process, condition or treatment will be avoided or minimized  Complications related to the disease process, condition or treatment will be avoided or minimized   Outcome: Ongoing  Patient and family updated on possible S/S of UTI. Comments: Care plan reviewed with patient and son. Patient and son verbalize understanding of the plan of care and contribute to goal setting.

## 2018-01-17 NOTE — FLOWSHEET NOTE
01/17/18 0037   Provider Notification   Reason for Communication Evaluate   Provider Name THE [facility]   Provider Notification Nurse Practitioner   Method of Communication Secure Message   Response Waiting for response   Notification Time 0037   4k19- Patient BP 73/34 map (49) on right side and 71/43 (53) on back. Manual check was 72/38. Patient came in with respiratory distress and altered mental status. Patient had BLE swelling upon admission and was on 60 mg Lasix BID. That just got discontinued today due to low urine output the night before and BUN elevated to 32 and Cre elevated to 1.4. Patient also has only had 200 ml output from 3pm to now. Do you want to try a bolus? ? Thanks!! Waiting for Response. 200- Message read by provider. 3294- Provider called back and stated Dr. Haley Love is going to come assess patient. 65- Dr. Haley Love up to assess patient. Rechecked BP without BiPAP on and /51 map 74. No new orders at this time.

## 2018-01-18 NOTE — PROGRESS NOTES
Hospitalist Progress Note    Patient:  Juan Carlos Guidry      Unit/Bed:4K-19/019-A    YOB: 1928    MRN: [id number]       Acct: [de-identified]     PCP: Tatyana Barr MD    Date of Admission: 1/12/2018    Chief Complaint:   Chief Complaint   Patient presents with    Shortness of Breath    Cough    Altered Mental Status     intermittent         Subjective:   Denies acute complaints at this time. No CP, dizziness, dyspnea  Cr trending down; 1.3 in am.    Medications:  Reviewed    Infusion Medications    sodium chloride 50 mL/hr at 01/18/18 1534    dextrose 5 % and 0.9 % NaCl       Scheduled Medications    ipratropium-albuterol  1 ampule Inhalation Q6H WA    insulin glargine  12 Units Subcutaneous Nightly    insulin lispro  4 Units Subcutaneous TID WC    pantoprazole  40 mg Oral Daily    amiodarone  200 mg Oral Daily    aspirin  81 mg Oral Daily    atorvastatin  80 mg Oral Daily    clopidogrel  75 mg Oral Daily    beclomethasone  2 puff Inhalation BID    hydrALAZINE  10 mg Oral 4 times per day    lisinopril  20 mg Oral Daily    methenamine  1 g Oral BID WC    metoprolol  100 mg Oral BID    primidone  50 mg Oral Nightly    sodium chloride flush  10 mL Intravenous 2 times per day    enoxaparin  40 mg Subcutaneous Q24H    insulin lispro  0-12 Units Subcutaneous TID WC    insulin lispro  0-6 Units Subcutaneous Nightly    doxazosin  4 mg Oral Nightly     PRN Meds: polyvinyl alcohol, sodium chloride flush, acetaminophen, magnesium hydroxide, ondansetron, glucose, dextrose, glucagon (rDNA), dextrose 5 % and 0.9 % NaCl, potassium chloride **OR** potassium chloride **OR** potassium chloride, magnesium sulfate, nitroGLYCERIN      Intake/Output Summary (Last 24 hours) at 01/18/18 1658  Last data filed at 01/18/18 1647   Gross per 24 hour   Intake          2580.15 ml   Output             1625 ml   Net           955.15 ml       Diet:  DIET CARDIAC;     Exam:  /64   Pulse 64   Temp 98.6 periventricular and subcortical white matter hypoattenuation is demonstrated likely representing sequela from mild chronic small vessel ischemic disease. However, there is a more focal area of low density demonstrated within the subcortical white matter of the right parietal lobe which appears more prominent when compared to prior examination from April 2013. This may represent an age-indeterminate infarct. **This report has been created using voice recognition software. It may contain minor errors which are inherent in voice recognition technology. ** Final report electronically signed by Dr. Chase Tobar on 1/12/2018 11:22 AM    Cta Chest W Wo Contrast    Result Date: 1/12/2018  PROCEDURE: CTA CHEST W WO CONTRAST CLINICAL INFORMATION: Shortness of breath, cough TECHNIQUE: CTA of the chest was performed following administration of 85 mL Isovue-370 intravenous contrast. Axial images as well as sagittal and bilateral oblique pulmonary artery reconstructions were obtained. All CT scans at this facility use dose modulation, iterative reconstruction, and/or weight-based dosing when appropriate to reduce radiation dose to as low as reasonably achievable. COMPARISON: CT chest 10/30/2017 FINDINGS: There are no filling defects or lack of contrast opacification in the visualized pulmonary arteries to suggest thromboembolism. The main pulmonary artery is enlarged, again measuring approximately 3.5 cm (image 81). Cardiac size is at the upper limits of normal. Atherosclerotic calcifications are present in the thoracic aorta and coronary arteries. 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 There is no pericardial effusion. Moderate bilateral pleural effusions are present. There are areas of adjacent lung consolidation. Fibrotic and emphysematous changes are noted in the bilateral lungs.  A previously identified subpleural masslike density in the Individualized dose optimization techniques were used for this CT.    COMPARISON:  January 1, 2015  ___________________________________    FINDINGS:  There is motion artifact. There is limited enhancement of the main pulmonary artery and right and  left pulmonary arteries. There is limited enhancement of the bilateral  peripheral pulmonary arteries. There is no demonstrated pulmonary  embolism. There is atherosclerotic calcification of the aortic arch with tortuosity. There is no demonstrated aortic dissection. There is stable borderline cardiac megaly. Normal mediastinum. Normal hilar regions. Normal visualized trachea and bronchi. There are much larger pleural effusions, both small to moderate in size. The previously seen left upper lobe mass has enlarged. It extends more  into the anterolateral chest wall. It measures 4.8 x 2.8 cm. There is  consolidation of the left upper lobe inferior to the mass. Again seen are  emphysematous changes of lungs. There is a new anterior left  cardiophrenic angle enlarged lymph node, mass or consolidation. There are degenerative changes of thoracic spine. There is a stable small hiatal hernia. Again seen are multiple  gallstones. There is stable bilateral adrenal nodules. Again seen are  right renal calculi. There is a small right renal cyst, not imaged  previously. Again seen is colonic diverticulosis. ___________________________________    IMPRESSION:  No demonstrated pulmonary embolism or aortic dissection. The previously seen left upper lobe mass has enlarged. There is  consolidation of the left upper lobe inferior to the mass. There is a new left cardiophrenic angle enlarged lymph node, mass or  consolidation. There are much larger pleural effusions, both small to moderate in size.     Electronically Signed:  Yuri Ward MD  2018/01/04 at 14:57 EST  Tel [phone] , Service support  [phone], Fax [phone] PERFORMING PROCEDURE: Adali Chase M.D. PROCEDURE:  Informed consent was obtained. Limited sonographic imaging of the posterior chest was performed for localization of the pleural effusion. The skin overlying the pleural effusion was marked. The puncture site was prepped and draped in a sterile fashion and locally anesthetized with 2% lidocaine. The distal tip of a 5 Uzbek one-step catheter was advance into the pleural effusion. An 83 ml specimen was obtained to be sent to the laboratory for analysis as per the orders of the referring physician. .  A total of 1.2 L clear yellow pleural fluid was then aspirated and discarded. The one-step catheter was then removed. The patient tolerated the procedure well and and there were no immediate complications. 1.  Uncomplicated ultrasound guided diagnostic and therapeutic right thoracentesis. **This report has been created using voice recognition software. It may contain minor errors which are inherent in voice recognition technology. ** Final report electronically signed by Dr. Erendira Lazcano on 2018 3:55 PM    Xr Chest 1 Vw    Result Date: 2018  Ordering Provider: [address] [place] [address]    Radiology Department  Patient:  Derian Linn [facility]. :  1928   Sex: [place], [address] [facility]  Location:    [phone]   Unit #:   [id number]    [facility]    Ordering Phys: Klever Pelayo PA-C      Exam Date: 18  Accession #:  [id number]  Exam:  MAIN   XR Chest 1 View Portable  Result:     STUDY:   X-RAY CHEST    REASON FOR EXAM:   Male, 80years old.     sob for 3 days/lung ca with  radiation treatment/copd/hx of ohs    TECHNIQUE:   Single AP portable view of the chest.    COMPARISON:   2015  ___________________________________    FINDINGS:    There is new right lower lobe consolidation. There is a denser bandlike  consolidation of the left upper lobe. There is new left basilar  atelectasis. There is a new small right pleural effusion. There is stable cardiac megaly. Normal mediastinum and ruperto. Normal  visualized pulmonary arteries. There is atherosclerotic calcification of  the aortic arch with tortuosity. Normal visualized thoracic spine. There is a stable old right rib  fracture. There is no demonstrated abnormality of the visualized soft tissue  structures of the upper abdomen.  ___________________________________    IMPRESSION:  There is new right lower lobe consolidation. There is a denser bandlike consolidation of the left upper lobe. There is a new small right pleural effusion. Electronically Signed:  Allison Norris MD  2018/01/04 at 12:31 EST  Tel [phone] , Service support  [phone], Fax [phone]          cc: Trina Tovar PA-C; Tatyana Brar MD      Dictated by:  Cristin Keys MD on 01/04/18 1212  Technologist:    Transcribed by:  Cristin Keys on 01/04/18 1231    Report Signed by:  Sergio Wolf on 01/04/18 1231        Diet: DIET CARDIAC; Disposition:    [x] Home       [] TCU       [] Rehab       [] Psych       [x] SNF       [] [place]       [] Other-    Code Status: Limited    Assessment/Plan:    Active Hospital Problems    Diagnosis Date Noted    Pneumonia due to organism [J18.9]      Priority: High    Coronary artery disease involving native coronary artery of native heart without angina pectoris [I25.10]      Priority: High    Acute encephalopathy [G93.40]     Dyspnea [R06.00] 07/30/2012     Assessment:         1. Acute on chronic hypoxic, hypercapnic respiratory failure,  Improving. 2. Acute on chronic diastolic CHF  3. Bilateral pleural effusion with compressive atelectasis  status post right-sided thoracentesis removal of 1200ml   Consistent with transudate  4. Elevated troponin without chest pain, likely demand ischemia  5. Acute toxic metabolic encephalopathy, improving. CT head finding noted   6.  Hypertensive urgency,

## 2018-01-18 NOTE — DISCHARGE INSTR - DIET
 Good nutrition is important when healing from an illness, injury, or surgery. Follow any nutrition recommendations given to you during your hospital stay.  If you were given an oral nutrition supplement while in the hospital, continue to take this supplement at home. You can take it with meals, in-between meals, and/or before bedtime. These supplements can be purchased at most local grocery stores, pharmacies, and chain super-stores.  If you have any questions about your diet or nutrition, call the hospital and ask for the dietitian. You are being placed on a diabetic carb counting diet. Eating healthy is the first step in controlling diabetes    Here's how to get started. ... Eat 3 meals a day. Eat your meals at the same time each day and do not skip meals. Eat about the same amount of food each day. Limit sugar and sweets. Eat less candy, desserts, pastries and jelly. Limit intake of regular pop, sugary beverages and fruit juice. Drink sugar free beverages such as diet pop, water, Crystal Light, and unsweetened tea instead. Use Equal or Sweet-n-Low in place of sugar. Lose weight if you are overweight. Even a small amount of weight loss may help improve your blood sugar control. To help lose weight, reduce your portion sizes. Control your intake of carbohydrates. Carbohydrate is the main  nutrient that affects blood sugar levels. All the carbohydrate you eat is turned  into sugar by your body. Therefore, it is important to control  the amount  of carbohydrate that you eat a day. You should eat about 60-75  grams of  carbohydrate at each meal.      Common sources of carbohydrates:     Eat more fiber. Fiber can help slow down the rise in blood sugar following a meal.  To get more fiber in your diet, eat at least 5 servings of fruits and vegetables a day, choose whole grain bread/cereal and eat more beans or legumes. Reduce your intake of high fat foods.    Cutting back on your intake of high fat food can help reduce body weight and cholesterol levels. Reduce intake of fried food, jarvis, sausage, luncheon meat, gravy, sour cream, cheese, egg yolks and margarine/butter. Limit your intake of alcohol. Drink alcohol only with permission of your doctor. Never drink alcohol on an empty stomach. Be more active. Regular exercise is an important part of your diabetes care as exercise can help lower your blood sugar levels. The type and amount of exercise that is right for you should be discussed with your doctor.

## 2018-01-18 NOTE — PLAN OF CARE
Problem: Impaired respiratory status  Goal: Clear lung sounds  Outcome: Ongoing  Pt had no questions on purpose or side effects of medication.   Will continue with treatments to help improve aeration t/o lungs  Needed assistance with MDI and spacer

## 2018-01-18 NOTE — PLAN OF CARE
Problem: Falls - Risk of  Goal: Absence of falls  Outcome: Met This Shift  No falls this shift. Pt up with standby assist to chair and BR. Problem: DISCHARGE BARRIERS  Goal: Patient's continuum of care needs are met  Outcome: Met This Shift  Plan discharge to [address] [place] [address] tomorrow if creatinine better. Problem: Pain Control  Goal: Maintain pain level at or below patient's acceptable level (or 5 if patient is unable to determine acceptable level)  Outcome: Met This Shift  Pt denies any pain this shift. Problem: Neurological  Goal: Maximum potential motor/sensory/cognitive function  Outcome: Completed Date Met: 01/18/18  Pt back to baseline with mentation. Problem: Cardiovascular  Goal: No DVT, peripheral vascular complications  Outcome: Met This Shift  No DVT this shift. On lovenox. Goal: Hemodynamic stability  Outcome: Met This Shift  Vitals stable this shift. Problem: Respiratory  Goal: No pulmonary complications  Outcome: Ongoing  Lung sounds remain clear. SOB with exertion. Goal: O2 Sat > 90%  Outcome: Ongoing  Remains above 90% on 3 L O2. Problem:   Goal: Adequate urinary output  Outcome: Met This Shift  I/O last 3 completed shifts: In: 2580.2 [P.O.:910; I.V.:1670.2]  Out: 9282 [UJBYA:6755]  I/O this shift:  In: -   Out: 150 [Urine:150]          Problem: Skin Integrity/Risk  Goal: No skin breakdown during hospitalization  Outcome: Met This Shift  No skin breakdown this shift. Problem: Urinary Elimination:  Goal: Signs and symptoms of infection will decrease  Signs and symptoms of infection will decrease   Outcome: Completed Date Met: 01/18/18  Currie removed. Goal: Complications related to the disease process, condition or treatment will be avoided or minimized  Complications related to the disease process, condition or treatment will be avoided or minimized   Outcome: Completed Date Met: 01/18/18      Comments: Care plan reviewed with patient.   Patient verbalize understanding of

## 2018-01-19 NOTE — PROGRESS NOTES
CLINICAL PHARMACY: DISCHARGE MED RECONCILIATION/REVIEW    [facility]) Select Patient?: Yes  Total # of Interventions Recommended: 0   -   Total # Interventions Accepted: 0  Intervention Severity:   - Level 1 Intervention Present?: No   - Level 2 #: 0   - Level 3 #: 0   Time Spent (min): 5    Additional Documentation:

## 2018-01-19 NOTE — PROGRESS NOTES
7/30/2012    Dr Tristin Rios    [facility] lymphocytic leukemia of B-cell type ([facility]     Chronic sinusitis     CLL (chronic lymphocytic leukemia) ([facility] 12/2012    Dr Clara Brizuela COPD (chronic obstructive pulmonary disease) ([facility]     Diabetes mellitus type II, uncontrolled ([facility] 7/30/2012    Dyspnea on exertion     Essential tremor 7/30/2012    [facility], [place] Curtis Olivera GERD (gastroesophageal reflux disease)     Hyperkalemia     Hyperlipidemia     Hypertension     Idiopathic gout     Incontinence     Kidney stone     Lung cancer ([facility]     Malignant neoplasm of upper lobe, left bronchus or lung (HCC)     Muscle weakness     Nephrolithiasis     Nonspecific abnormal finding of lung field     Obesity     Paroxysmal a-fib (HCC)     Pneumonia     Presence of aortocoronary bypass graft     Presence of cardiac and vascular implant and graft     Prostate disease     Shingles     history of    Skin cancer     Dr Sandra Song    [facility] 12/11/2017    Tobacco abuse     history of     Past Surgical History:   Procedure Laterality Date    APPENDECTOMY      BACK SURGERY  1995    discectomy    CARDIAC SURGERY  1992    CABG    CATARACT REMOVAL Bilateral     COLONOSCOPY  11/2005    HERNIA REPAIR      bilateral inguinal    INNER EAR SURGERY      x2 , Dr Serena Guerrero OTHER SURGICAL HISTORY      excision of basal cell carcinoma    OTHER SURGICAL HISTORY  2000    right lower eyelid    PACEMAKER PLACEMENT             Subjective       Subjective: Pt. seated in recline rupon arrival, agreeable to OT treatment              Pain:  Pain Assessment  Patient Currently in Pain: Denies       Objective  Overall Cognitive Status: WFL             ADL  Grooming: Setup  UE Bathing: Stand by assistance  LE Bathing: Contact guard assistance (CGA while standing to wash bottom)  UE Dressing: Minimal assistance (Min A with gown)  LE Dressing: Dependent/Total  Toileting: Stand by assistance

## 2018-01-19 NOTE — DISCHARGE SUMMARY
Hospital Medicine Discharge Summary      Patient Identification:   Adam Seo   : 1928  MRN: [id number]   Account: [de-identified]      Patient's PCP: Malgorzata Bahena MD    Admit Date: 2018     Discharge Date: 2018    Admitting Physician: Elin Giraldo MD     Discharge Physician: Oliver Vieira MD     Discharge Diagnoses and hospital course: Active Hospital Problems    Diagnosis Date Noted    Pneumonia due to organism [J18.9]      Priority: High    Coronary artery disease involving native coronary artery of native heart without angina pectoris [I25.10]      Priority: High    Acute encephalopathy [G93.40]     Dyspnea [R06.00] 2012     1. Acute on chronic hypoxic, hypercapnic respiratory failure. Resolved. On home dose O2.  2. Acute on chronic diastolic CHF. S/p IV diuretics which was held 2/2 DARIAN. Clinically improved. 3. DARIAN likely 2/2 prerenal azotemia 2/2 diuretics. Lasix held and started on gentle IVF hydration with improvement in cr.  4. Bilateral pleural effusion with compressive atelectasis  status post right-sided thoracentesis removal of 1200ml   Consistent with transudate  5. Elevated troponin without chest pain, likely demand ischemia  6. Acute toxic metabolic encephalopathy, improving. CT head finding noted   7. Hypertensive urgency, improving.         comorbidities:     · Lung cancer undergoing radiation therapy  · morbid obesity Body mass index is 32.44 kg/m². · History of prostatic hyperplasia  · History of  CLL with Thrombocytopenia  ·  Diabetes mellitus type 2    The patient was seen and examined on day of discharge and this discharge summary is in conjunction with any daily progress note from day of discharge.     Exam:     Vitals:  Vitals:    18 0043 18 0410 18 0805 18 1113   BP:  131/60 (!) 161/71 (!) 160/70   Pulse:  75 81 69   Resp:    Temp:  98 °F (36.7 °C) 98.3 °F (36.8 °C) 98.1 °F (36.7 °C)   TempSrc:  Oral of low density demonstrated within the subcortical white matter of the right parietal lobe which appears more prominent when compared to prior examination from April 2013. This may represent an age-indeterminate infarct. **This report has been created using voice recognition software. It may contain minor errors which are inherent in voice recognition technology. ** Final report electronically signed by Dr. Ezequiel Dee on 1/12/2018 11:22 AM    Cta Chest W Wo Contrast    Result Date: 1/12/2018  PROCEDURE: CTA CHEST W WO CONTRAST CLINICAL INFORMATION: Shortness of breath, cough TECHNIQUE: CTA of the chest was performed following administration of 85 mL Isovue-370 intravenous contrast. Axial images as well as sagittal and bilateral oblique pulmonary artery reconstructions were obtained. All CT scans at this facility use dose modulation, iterative reconstruction, and/or weight-based dosing when appropriate to reduce radiation dose to as low as reasonably achievable. COMPARISON: CT chest 10/30/2017 FINDINGS: There are no filling defects or lack of contrast opacification in the visualized pulmonary arteries to suggest thromboembolism. The main pulmonary artery is enlarged, again measuring approximately 3.5 cm (image 81). Cardiac size is at the upper limits of normal. Atherosclerotic calcifications are present in the thoracic aorta and coronary arteries. 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 There is no pericardial effusion. Moderate bilateral pleural effusions are present. There are areas of adjacent lung consolidation. Fibrotic and emphysematous changes are noted in the bilateral lungs. A previously identified subpleural masslike density in the anterior left upper lung now measures approximately 5.6 x 2.6 cm (image 49; prior measurement 5.8 x 2.6 cm).  There is an additional masslike density in the lingula measuring approximately 3.0 limited enhancement of the main pulmonary artery and right and  left pulmonary arteries. There is limited enhancement of the bilateral  peripheral pulmonary arteries. There is no demonstrated pulmonary  embolism. There is atherosclerotic calcification of the aortic arch with tortuosity. There is no demonstrated aortic dissection. There is stable borderline cardiac megaly. Normal mediastinum. Normal hilar regions. Normal visualized trachea and bronchi. There are much larger pleural effusions, both small to moderate in size. The previously seen left upper lobe mass has enlarged. It extends more  into the anterolateral chest wall. It measures 4.8 x 2.8 cm. There is  consolidation of the left upper lobe inferior to the mass. Again seen are  emphysematous changes of lungs. There is a new anterior left  cardiophrenic angle enlarged lymph node, mass or consolidation. There are degenerative changes of thoracic spine. There is a stable small hiatal hernia. Again seen are multiple  gallstones. There is stable bilateral adrenal nodules. Again seen are  right renal calculi. There is a small right renal cyst, not imaged  previously. Again seen is colonic diverticulosis. ___________________________________    IMPRESSION:  No demonstrated pulmonary embolism or aortic dissection. The previously seen left upper lobe mass has enlarged. There is  consolidation of the left upper lobe inferior to the mass. There is a new left cardiophrenic angle enlarged lymph node, mass or  consolidation. There are much larger pleural effusions, both small to moderate in size. Electronically Signed:  Fatou Potter MD  2018/01/04 at 14:57 EST  Tel [phone] , Service support  [phone], Fax [phone]          cc:  Anna Franco PA-C; Hari Maurice MD      Dictated by:  Valerie Rodriguez MD on 01/04/18 [place] [address]  Technologist:  Pino Preciado RT(R)(M)  Transcribed by:  Valerie Rodriguez on 01/04/18 [id number] effusion. The skin overlying the pleural effusion was marked. The puncture site was prepped and draped in a sterile fashion and locally anesthetized with 2% lidocaine. The distal tip of a 5 Peruvian one-step catheter was advance into the pleural effusion. An 83 ml specimen was obtained to be sent to the laboratory for analysis as per the orders of the referring physician. .  A total of 1.2 L clear yellow pleural fluid was then aspirated and discarded. The one-step catheter was then removed. The patient tolerated the procedure well and and there were no immediate complications. 1.  Uncomplicated ultrasound guided diagnostic and therapeutic right thoracentesis. **This report has been created using voice recognition software. It may contain minor errors which are inherent in voice recognition technology. ** Final report electronically signed by Dr. Ibarra Dates on 2018 3:55 PM    Xr Chest 1 Vw    Result Date: 2018  Ordering Provider: [address] [place] [address]    Radiology Department  Patient:  Yael Cuenca  [address] [facility]. :  1928   Sex: [place], [address] [facility]  Location:  [place] [id number]   Unit #:   [id number]    Acct #:  [de-identified]    Ordering Phys: Kim Farnsworth PA-C      Exam Date: 18  Accession #:  [id number]  Exam:  MAIN   XR Chest 1 View Portable  Result:     STUDY:   X-RAY CHEST    REASON FOR EXAM:   Male, 80years old.     sob for 3 days/lung ca with  radiation treatment/copd/hx of ohs    TECHNIQUE:   Single AP portable view of the chest.    COMPARISON:   2015  ___________________________________    FINDINGS:    There is new right lower lobe consolidation. There is a denser bandlike  consolidation of the left upper lobe. There is new left basilar  atelectasis. There is a new small right pleural effusion. There is stable cardiac megaly. Normal mediastinum and ruperto. Normal  visualized pulmonary arteries.   There is atherosclerotic [place], [address] Medication Instructions INH:[id number]    Printed on:01/19/18 4468   Medication Information                      albuterol (PROVENTIL) (2.5 MG/3ML) 0.083% nebulizer solution  Take 3 mLs by nebulization every 4 hours as needed for Wheezing             amiodarone (CORDARONE) 200 MG tablet  Take 200 mg by mouth daily             aspirin 81 MG tablet  Take 81 mg by mouth daily. atorvastatin (LIPITOR) 80 MG tablet  TAKE ONE TABLET BY MOUTH ONCE DAILY             Blood Glucose Monitoring Suppl (ONE TOUCH ULTRA MINI) w/Device KIT  1 kit by Does not apply route daily Dx E11.8             budesonide (PULMICORT FLEXHALER) 180 MCG/ACT AEPB inhaler  Inhale 2 puffs into the lungs daily             clopidogrel (PLAVIX) 75 MG tablet  TAKE ONE TABLET BY MOUTH ONCE DAILY             Coenzyme Q10 (COQ10 PO)  Take 10 mg by mouth daily              Cranberry 500 MG TABS  Take 1,000 mg by mouth daily              fish oil-omega-3 fatty acids 1000 MG capsule  Take 1 g by mouth daily              furosemide (LASIX) 40 MG tablet  TAKE ONE TABLET BY MOUTH ONCE DAILY             hydrALAZINE (APRESOLINE) 10 MG tablet  TAKE ONE TABLET BY MOUTH 4 TIMES DAILY             insulin aspart protamine-insulin aspart (NOVOLOG MIX 70/30 FLEXPEN) (70-30) 100 UNIT/ML injection  Inject 13 Units into the skin 2 times daily             Insulin Pen Needle (B-D ULTRAFINE III SHORT PEN) 31G X 8 MM MISC  Inject insulin SQ 2 x daily (Dx: E11.65)             Lancets MISC  Check 2x/day             lisinopril (PRINIVIL;ZESTRIL) 20 MG tablet  Take 1 tablet by mouth daily             methenamine (HIPREX) 1 G tablet  Take 1 g by mouth 2 times daily (with meals)             metoprolol (LOPRESSOR) 100 MG tablet  Take 100 mg by mouth 2 times daily.              nitroGLYCERIN (NITROSTAT) 0.4 MG SL tablet  Place 1 tablet under the tongue every 5 minutes as needed for Chest pain             ONE TOUCH ULTRA TEST strip  Check blood

## 2018-01-19 NOTE — CARE COORDINATION
1/19/18, 12:01 PM  Patient is discharged today by ambulette. SW spoke to son Tony Vasquez yesterday to inform of the private pay status and was agreeable and requested bill be sent to him. This was provided to Jan Nick with [facility]. SW left a message for his son, today, on the home number to inform of the discharge. No cell phone listed. SW spoke to the patient to inform of this and he did not feel SW needed to notify anyone else in the family. BARBARA spoke to [place] with [facility] to inform of discharge. Discharge plan discussed by  and . Discharge plan reviewed with patient/ family. Patient/ family verbalize understanding of discharge plan and are in agreement with plan. Understanding was demonstrated using the teach back method.      Services After Discharge  Services At/After Discharge: Nursing Services, Skilled Therapy, In ambulette   IMM Letter  IMM Letter given to Patient/Family/Significant other/Guardian/POA/by[de-identified]   IMM Letter date given[de-identified] 01/18/18  IMM Letter time given[de-identified] [phone]

## 2018-01-19 NOTE — PLAN OF CARE
Problem: Impaired respiratory status  Goal: Clear lung sounds  Outcome: Ongoing  Breath sounds diminished, continue aerosols to decrease shortness of breath

## 2018-02-02 NOTE — CARE COORDINATION
Spoke with staff at [facility] and was informed pt discharged yesterday to [facility]. Staff unaware of level of care. Spoke with Nayan Quiroz at [facility] who informed that pt is skilled. Will continue to follow for care transitions.     [facility]en De [facility]   [phone]

## 2018-02-14 NOTE — CARE COORDINATION
I spoke with Maylin Carr at [facility] re: patient and plan of care. Patient is doing well with no current concerns. Patient remains active with therapy. No plans for discharge at this time. I will contact facility next week for an update.

## 2018-03-01 PROBLEM — J10.1 INFLUENZA B: Status: ACTIVE | Noted: 2018-01-01

## 2018-03-01 NOTE — ED PROVIDER NOTES
[facility]  eMERGENCY dEPARTMENT eNCOUnter          CHIEF COMPLAINT       Chief Complaint   Patient presents with    Shortness of Breath       Nurses Notes reviewed and I agree except as noted in the HPI. HISTORY OF PRESENT ILLNESS    Moira Kee is a 80 y.o. male who presents  with the chief complaint shortness of breath.   just recently released from the nursing home after being treated for pneumonia and possibly CHF. He was discharged home with oxygen 3 L. However despite the oxygen therapy and became acutely short of breath and presents today. REVIEW OF SYSTEMS     Review of Systems   Constitutional: Negative for chills, fatigue and fever. HENT: Negative for ear pain, rhinorrhea and sore throat. Eyes: Negative for pain, discharge, redness and visual disturbance. Respiratory: Positive for shortness of breath. Negative for cough and wheezing. Cardiovascular: Negative for chest pain, palpitations and leg swelling. Gastrointestinal: Negative for abdominal pain, diarrhea, nausea and vomiting. Genitourinary: Negative for difficulty urinating and dysuria. Musculoskeletal: Negative for arthralgias, back pain and myalgias. Skin: Negative for rash. Allergic/Immunologic: Negative for environmental allergies. Neurological: Negative for dizziness, seizures, syncope and headaches. Hematological: Negative for adenopathy. Psychiatric/Behavioral: Negative for suicidal ideas. The patient is not nervous/anxious. PAST MEDICAL HISTORY    has a past medical history of Acute and chronic respiratory failure with hypoxia ([address] Acute respiratory distress syndrome (ARDS) ([address] Adrenal disease ([address] Adrenal tumor; Atherosclerotic heart disease of native coronary artery with angina pectoris ([address] Atrial fibrillation ([address] BPH (benign prostatic hyperplasia); CAD (coronary artery disease); Cardiac pacemaker; Cavernous hemangioma;  Cerebrovascular accident [facility]); CHF (congestive heart Left Ear: External ear normal.   Eyes: Conjunctivae are normal. Right eye exhibits no discharge. Left eye exhibits no discharge. No scleral icterus. Neck: Normal range of motion. No JVD present. No tracheal deviation present. No thyromegaly present. Cardiovascular: Normal rate and regular rhythm. Exam reveals no gallop and no friction rub. No murmur heard. Bilateral lower extremity pitting edema   Pulmonary/Chest: No stridor. He is in respiratory distress. He has wheezes. He has no rales. Abdominal: Soft. He exhibits no distension. There is no tenderness. There is no rebound and no guarding. Musculoskeletal: Normal range of motion. He exhibits no edema or tenderness. Neurological: He is alert and oriented to person, place, and time. Coordination normal.   Skin: Skin is warm and dry. No rash (On exposed body surfaces) noted. He is not diaphoretic. Psychiatric: He has a normal mood and affect. His behavior is normal. Thought content normal.   Nursing note and vitals reviewed. DIFFERENTIAL DIAGNOSIS:   CHF, pneumonia, PE    DIAGNOSTIC RESULTS     EKG: All EKG's are interpreted by the Emergency Department Physician who either signs or Co-signs this chart in the absence of a cardiologist.  EKG    The patient had an EKG which is interpreted by me in the absence of a Cardiologist.   [x] Without comparison to previous. [] With comparison to a previous EKG Dated     Electronic pacemaker, right bundle branch block, abnormal EKG    RADIOLOGY: non-plain film images(s) such as CT, Ultrasound and MRI are read by the radiologist.  Cta Chest W Wo Contrast    Result Date: 3/1/2018  PROCEDURE: CTA CHEST W WO CONTRAST CLINICAL INFORMATION: DYSPNEA, ON EXERTION, . COMPARISON: No prior study. TECHNIQUE: 1.5 mm axial images were obtained through the chest after the administration of IV contrast.  A non-contrast localizer was obtained.   3D reconstructions were performed on the scanner to include sagittal and

## 2018-03-01 NOTE — ED NOTES
Second blood culture taken. Needed larger catheter for CTA. Resp easier. Lungs diminished bases tammy.      Homer Perales RN  03/01/18 PHILLIP Bowen  03/01/18 9488

## 2018-03-01 NOTE — TELEPHONE ENCOUNTER
Patient nurse from [facility] called voicing patient was d/c form the [facility] yesterday. Today, patient is SOB, has diminished lung sounds, breathing 30-40 times per minute, and 87% on 3.5L of O2. Naomi Yoder did advise patient to go to [facility] for evaluation but is refusing at this time. Patient is scheduled with Patrick this afternoon. Per Juanis Tony, advised Naomi Yoder for patient to go to ER to be evaluated. Patrick advised if patients sats are that low at appointment still he is going to send him next door for evaluation any ways. Naomi Yoder voiced understanding with no concerns voiced. Advised would call office back after she spoke with patient again.

## 2018-03-02 PROBLEM — Z85.118 HISTORY OF LUNG CANCER: Status: ACTIVE | Noted: 2018-01-01

## 2018-03-02 PROBLEM — N18.9 ACUTE ON CHRONIC RENAL FAILURE (HCC): Status: RESOLVED | Noted: 2018-01-01 | Resolved: 2018-01-01

## 2018-03-02 PROBLEM — J96.21 ACUTE ON CHRONIC RESPIRATORY FAILURE WITH HYPOXIA (HCC): Status: ACTIVE | Noted: 2018-01-01

## 2018-03-02 PROBLEM — E66.9 OBESITY (BMI 30-39.9): Status: ACTIVE | Noted: 2018-01-01

## 2018-03-02 PROBLEM — N18.30 CHRONIC RENAL FAILURE, STAGE 3 (MODERATE) (HCC): Status: ACTIVE | Noted: 2018-01-01

## 2018-03-02 PROBLEM — N17.9 ACUTE ON CHRONIC RENAL FAILURE (HCC): Status: RESOLVED | Noted: 2018-01-01 | Resolved: 2018-01-01

## 2018-03-02 PROBLEM — N18.9 ACUTE ON CHRONIC RENAL FAILURE (HCC): Status: ACTIVE | Noted: 2018-01-01

## 2018-03-02 PROBLEM — N17.9 ACUTE RENAL FAILURE (ARF) (HCC): Status: ACTIVE | Noted: 2018-01-01

## 2018-03-02 NOTE — PLAN OF CARE
Problem: DISCHARGE BARRIERS  Goal: Patient's continuum of care needs are met  Outcome: Ongoing  Home with [facility], will need new order. See  progress notes.

## 2018-03-02 NOTE — PLAN OF CARE
Problem: Impaired respiratory status  Goal: Clear lung sounds  Clear lung sounds    Outcome: Ongoing  Keep lungs clear throughout

## 2018-03-02 NOTE — CONSULTS
Eyes: Negative. Upper respiratory tract: No nasal stuffiness or post nasal drip. Lower respiratory tract/ lungs: (+) cough with whitish sputum production. No hemoptysis. (+) BUTCHER  Cardiovascular: No palpitations, chest pain or edema. Gastrointestinal: No nausea or vomiting. Neurological: No focal neurological weakness. Extremities: No tenderness. Musculoskeletal: no complaints  Genitourinary: No complaints. Hematological: Negative. Denies easy buising  Skin: No itching. Meds    Current Medications    oseltamivir  30 mg Oral Once    aspirin  81 mg Oral Daily    amiodarone  200 mg Oral Daily    beclomethasone  2 puff Inhalation BID    insulin lispro protamine & lispro  10 Units Subcutaneous BID WC    atorvastatin  80 mg Oral Daily    hydrALAZINE  10 mg Oral 3 times per day    furosemide  40 mg Oral Daily    metoprolol  100 mg Oral BID    clopidogrel  75 mg Oral Daily    methenamine  1 g Oral Daily    sodium chloride flush  10 mL Intravenous 2 times per day    enoxaparin  40 mg Subcutaneous Q24H    albuterol  2.5 mg Nebulization Q4H WA    oseltamivir  30 mg Oral BID    doxazosin  4 mg Oral Daily     acetaminophen, nitroGLYCERIN, albuterol, sodium chloride flush, magnesium hydroxide, ondansetron, glucose, dextrose, glucagon (rDNA), dextrose  IV Drips/Infusions   sodium chloride 75 mL/hr at 03/01/18 1938    dextrose       Vitals    Vitals    height is 5' 11\" (1.803 m) and weight is 249 lb 12.8 oz (113.3 kg). His oral temperature is 97.8 °F (36.6 °C). His blood pressure is 109/51 (abnormal) and his pulse is 61. His respiration is 16 and oxygen saturation is 90%.      O2 Flow Rate (L/min): 3 L/min  I/O    Intake/Output Summary (Last 24 hours) at 03/02/18 0810  Last data filed at 03/02/18 0330   Gross per 24 hour   Intake           1183.1 ml   Output              500 ml   Net            683.1 ml     Patient Vitals for the past 96 hrs (Last 3 readings):   Weight   03/02/18 0330 249 lb 12.8 oz (113.3 kg)   03/01/18 1700 250 lb 12.8 oz (113.8 kg)   03/01/18 1153 252 lb (114.3 kg)     Exam   Constitutional: Patient appears moderately built and moderately nourished. Head: Normocephalic and atraumatic. Mouth/Throat: Oropharynx is clear and moist.  No oral thrush. Eyes: Conjunctivae are normal. Pupils are equal, round, and reactive to light. No scleral icterus. Neck: Neck supple. No JVD or tracheal deviation present. Cardiovascular: Regular rate, regular rhythm, S1 and S2 with no murmur. Bilateral peripheral edema  Pulmonary/Chest: Normal effort with good breath sounds. No stridor. No respiratory distress. Abdominal: Soft. Bowel sounds audible. No distension or tenderness to palp  Musculoskeletal: Moves all extremities  Lymphadenopathy:  No cervical adenopathy. Neurological: Patient is alert and oriented to person, place, and time. Skin: Skin is warm and dry. Labs   ABG  Lab Results   Component Value Date    PH 7.44 01/15/2018    PH 7.34 01/04/2018    PO2 67 01/15/2018    PCO2 70 01/15/2018    HCO3 47 01/15/2018    O2SAT 92 01/15/2018     Lab Results   Component Value Date    IFIO2 3 01/15/2018     CBC  Recent Labs      03/01/18   1220  03/02/18   0612   WBC  11.2*  8.8   RBC  3.35*  3.07*   HGB  10.8*  9.7*   HCT  31.9*  30.0*   MCV  95.0*  97.8*   MCH  32.2*  31.7*   MCHC  33.9  32.5*   RDW  13.4  17.4*   PLT  127*  136   MPV  7.3*  7.1*      BMP  Recent Labs      03/01/18   1220  03/02/18   0612   NA  144  142   K  4.9  4.8   CL  105  106   CO2  35*  26   BUN  33*  30*   CREATININE  1.4*  1.0   GLUCOSE  163*  132*   MG  2.1   --    CALCIUM   --   9.1     LFT  No results for input(s): AST, ALT, ALB, BILITOT, ALKPHOS, LIPASE in the last 72 hours. Invalid input(s):   AMYLASE  TROP  Lab Results   Component Value Date    TROPONINT 0.050 01/12/2018    TROPONINT 0.055 01/12/2018    TROPONINT 0.050 01/12/2018     BNP  Lab Results   Component Value Date    PROBNP 915.9 01/17/2018    PROBNP baseline 02 of 2.5-3L    Thank you for the consult and allowing us to participate in the care of your patient. Case discussed with nurse and patient/family. Questions and concerns addressed. Meds and Orders reviewed. Electronically signed by     Iveth Hammond CNP on 3/2/2018 at 8:10 AM    Addendum by Dr. Hima Pettit MD:  I have seen and examined the patient independently. Face to face evaluation and examination was performed. The above evaluation and note has been reviewed. Labs and radiographs were reviewed. I Have discussed with Ms. Terra Pichardo CNP about this patient in detail. D/w Patient's R.N. and specific instructions given. Patient issues addressed. The above assessment and plan has been reviewed. Please see my modifications mentioned below. My modifications:  Sitting in chair. No chest pain. He follows with Dr. Donavan jasso MD  For right side thoracentesis by IR today.   Will request Dr. Donavan jasso MD to resume care in am.    Marita Espino MD 3/2/2018 5:44 PM

## 2018-03-02 NOTE — PROGRESS NOTES
03/02/18 1815 perfectserved Dr. Marky Crawford for consult requested by Dr. Whitfield Primrose for Hx of lung cancer, with right pleural effusion, and pneumonia and Flu B positive. Dr. Marky Crawford called back immediately stating will see pt tomorrow.

## 2018-03-02 NOTE — H&P
weakness     Nephrolithiasis     Nonspecific abnormal finding of lung field     Obesity     Paroxysmal a-fib (HCC)     Pneumonia     Presence of aortocoronary bypass graft     Presence of cardiac and vascular implant and graft     Prostate disease     Shingles     history of    Skin cancer     Dr Giancarlo Call    Squamous cell carcinoma lung, left ([address] 12/11/2017    Tobacco abuse     history of       Past Surgical History:          Procedure Laterality Date    APPENDECTOMY      BACK SURGERY  1995    discectomy    CARDIAC SURGERY  1992    CABG    CATARACT REMOVAL Bilateral     COLONOSCOPY  11/2005    HERNIA REPAIR      bilateral inguinal    INNER EAR SURGERY      x2 , Dr Miguel Barger      excision of basal cell carcinoma    OTHER SURGICAL HISTORY  2000    right lower eyelid    PACEMAKER PLACEMENT         Medications Prior to Admission:      Prior to Admission medications    Medication Sig Start Date End Date Taking?  Authorizing Provider   lisinopril (PRINIVIL;ZESTRIL) 20 MG tablet Take 1 tablet by mouth daily 1/20/18  Yes Radha Gilbert MD   amiodarone (CORDARONE) 200 MG tablet Take 200 mg by mouth daily   Yes Historical Provider, MD   budesonide (PULMICORT FLEXHALER) 180 MCG/ACT AEPB inhaler Inhale 2 puffs into the lungs daily   Yes Historical Provider, MD   insulin aspart protamine-insulin aspart (NOVOLOG MIX 70/30 FLEXPEN) (70-30) 100 UNIT/ML injection Inject 13 Units into the skin 2 times daily   Yes Historical Provider, MD   atorvastatin (LIPITOR) 80 MG tablet TAKE ONE TABLET BY MOUTH ONCE DAILY 12/20/17  Yes Rach Bai MD   Insulin Pen Needle (B-D ULTRAFINE III SHORT PEN) 31G X 8 MM MISC Inject insulin SQ 2 x daily (Dx: E11.65) 12/18/17  Yes Chilango Gongora CNP   hydrALAZINE (APRESOLINE) 10 MG tablet TAKE ONE TABLET BY MOUTH 4 TIMES DAILY 10/9/17  Yes Rach Bia MD   Blood Glucose Monitoring Suppl (ONE TOUCH ULTRA MINI) w/Device KIT 1 kit Heart Disease Father        Diet:  DIET CARB CONTROL;    REVIEW OF SYSTEMS:   Pertinent positives as noted in the HPI. All other systems reviewed and negative. PHYSICAL EXAM:    BP (!) 168/73   Pulse 60   Temp 98.3 °F (36.8 °C) (Oral)   Resp 26   Ht 5' 11\" (1.803 m)   Wt 250 lb 12.8 oz (113.8 kg)   SpO2 98%   BMI 34.98 kg/m²     General appearance:  No apparent distress, appears stated age and cooperative. HEENT:  Normal cephalic, atraumatic without obvious deformity. Pupils equal, round, and reactive to light. Extra ocular muscles intact. Conjunctivae/corneas clear. Neck: Supple, with full range of motion. No jugular venous distention. Trachea midline. Respiratory:  Normal respiratory effort. Clear to auscultation, bilaterally without Rales/Wheezes/Rhonchi. Cardiovascular:  Regular rate and rhythm with normal S1/S2 without murmurs, rubs or gallops. Abdomen: Soft, non-tender, non-distended with normal bowel sounds. Musculoskeletal:  No clubbing, cyanosis or edema bilaterally. Full range of motion without deformity. Skin: Skin color, texture, turgor normal.  No rashes or lesions. Neurologic:  Neurovascularly intact without any focal sensory/motor deficits. Cranial nerves: II-XII intact, grossly non-focal.  Psychiatric:  Alert and oriented, thought content appropriate, normal insight  Capillary Refill: Brisk,< 3 seconds   Peripheral Pulses: +2 palpable, equal bilaterally       Labs:     Recent Labs      03/01/18   1220   WBC  11.2*   HGB  10.8*   HCT  31.9*   PLT  127*     Recent Labs      03/01/18   1220   NA  144   K  4.9   CL  105   CO2  35*   BUN  33*   CREATININE  1.4*     No results for input(s): AST, ALT, BILIDIR, BILITOT, ALKPHOS in the last 72 hours. No results for input(s): INR in the last 72 hours.   Recent Labs      03/01/18   1220   TROPONINI  < 0.017       Urinalysis:      Lab Results   Component Value Date    NITRU NEGATIVE 01/12/2018    WBCUA NONE 01/12/2018    WBCUA 0-5 01/02/2015 BACTERIA NONE 01/12/2018    RBCUA 3-5 01/12/2018    BLOODU NEGATIVE 01/12/2018    SPECGRAV 1.020 01/23/2014    SPECGRAV 1.010 10/19/2013    GLUCOSEU NEGATIVE 01/12/2018       Radiology:         CTA CHEST W WO CONTRAST   Final Result      1. No evidence for pulmonary embolism. 2. Enlarged pulmonary arteries suggesting pulmonary artery hypertension. 3. Moderate bilateral pleural effusions. 4. Left upper lobe/lingula irregular, masslike consolidation, slightly more prominent when compared to prior study. Correlate for scarring, atelectasis, infection or tumor. Post radiation fibrosis as can also be considered. 5. Masslike consolidation in the right lower lobe correlate for infection or tumor. 6. Stable bilateral adrenal gland masses. Limited study secondary to motion artifact. No evidence for pulmonary artery embolism. Bilateral pleural effusions right greater than left bilateral nodular consolidation correlate for mass, scarring, atelectasis or infection. **This report has been created using voice recognition software. It may bilateral consolidation contain minor errors which are inherent in voice recognition technology. **      Final report electronically signed by Dr. Beth Gleason on 3/1/2018 1:52 PM               DVT prophylaxis: [x] Lovenox                                 [] SCDs                                 [] SQ Heparin                                 [] Encourage ambulation           [] Already on Anticoagulation    Code Status: Full Code        Disposition:    [x] Home       [] TCU       [] Rehab       [] Psych       [] SNF       [] [place]       [] Other-    ASSESSMENT:    Active Hospital Problems    Diagnosis Date Noted    Influenza, pneumonia [J11.00] 03/01/2018    Influenza B [J10.1] 03/01/2018       PLAN:    1. Influenza B  -Isolation precautions per protocol  -Tamiflu 30 mg PO BID, renally adjusted  -O2 supplementation to keep O2 sats >92%  -Steven    2.

## 2018-03-02 NOTE — CARE COORDINATION
son, he was discharged from [address] [place] [address] one day ago. He returned home and had [facility] come out to admit, but they instead directed him to the ER. He has home oxygen at 2.5-3L continuously and uses a 4 wheeled walker with a seat for ambulation.        Evaluation: yes

## 2018-03-02 NOTE — PLAN OF CARE
Problem: Impaired respiratory status  Goal: Clear lung sounds  Clear lung sounds     Outcome: Ongoing  Improve breath sounds, increase aeration and decrease WOB. Improve cough effectiveness.

## 2018-03-03 NOTE — PROGRESS NOTES
nonproductive cough. Objective  Physical exam:  Vitals: BP (!) 117/59   Pulse 62   Temp 98.2 °F (36.8 °C) (Oral)   Resp 22   Ht 5' 11\" (1.803 m)   Wt 250 lb (113.4 kg)   SpO2 (!) 89%   BMI 34.87 kg/m²   Gen: Not in distress. Alert. Oriented. Head: Normocephalic. Atraumatic. Eyes: EOMI. Good acuity. ENT: Oral mucosa moist  Neck: No JVD. No obvious thyromegaly. CVS: Nml S1S2, no MRG, RRR  Pulmomary: Bibasilar crackles, diminished right base. Gastrointestinal: Soft, NT/ND. Positive bowel sounds. Musculoskeletal: Bilateral LE edema, worse on right. Warm  Neuro: No focal deficit. Moves extremity spontaneously. Psychiatry: Appropriate affect. Not agitated. Skin: Warm, dry with normal turgor. No rash      24HR INTAKE/OUTPUT:      Intake/Output Summary (Last 24 hours) at 03/03/18 1320  Last data filed at 03/03/18 0318   Gross per 24 hour   Intake            870.1 ml   Output              850 ml   Net             20.1 ml     I/O last 3 completed shifts: In: 870.1 [P.O.:600; I.V.:270.1]  Out: 850 [Urine:850]  No intake/output data recorded.       Meds:    guaiFENesin  600 mg Oral BID    furosemide  40 mg Intravenous TID    aspirin  81 mg Oral Daily    amiodarone  200 mg Oral Daily    beclomethasone  2 puff Inhalation BID    insulin lispro protamine & lispro  10 Units Subcutaneous BID WC    atorvastatin  80 mg Oral Daily    metoprolol  100 mg Oral BID    clopidogrel  75 mg Oral Daily    methenamine  1 g Oral Daily    sodium chloride flush  10 mL Intravenous 2 times per day    enoxaparin  40 mg Subcutaneous Q24H    albuterol  2.5 mg Nebulization Q4H WA    oseltamivir  30 mg Oral BID       Infusions:    dextrose           PRN Meds: oxyCODONE-acetaminophen, potassium chloride **OR** potassium chloride **OR** potassium chloride, magnesium sulfate, sodium phosphate IVPB **OR** sodium phosphate IVPB, acetaminophen, nitroGLYCERIN, albuterol, sodium chloride flush, magnesium hydroxide, ondansetron,

## 2018-03-03 NOTE — CONSULTS
Department of Internal Medicine  Pulmonary  Attending Consult Note      Reason for Consult:  Chronic respiratory failure, influenza pneumonia  Requesting Physician:  Hospitalist    CHIEF COMPLAINT:  Shortness of breath, generalized malaise    History Obtained From:  patient    HISTORY OF PRESENT ILLNESS:                The patient is a 80 y.o. male patient of mine, recently seen about 1 week ago in clinic presents for generalized malaise and shortness of breath. The patient was recently doing very well and was going to be discharged from the nursing facility due to doing well, however, after getting home developed these symptoms requiring ER visit. The patient was found to have influenza pneumonia as well as CHF and pleural effusion. The patient currently states his breathing is feeling ok but is just feeling overall unwell. No other complaints could be elicited at this time.     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)     Atrial fibrillation (HCC)     BPH (benign prostatic hyperplasia) 7/30/2012    CAD (coronary artery disease)     Dr Jackelyn Kahn Cardiac pacemaker     Cavernous hemangioma     of liver, history of    Cerebrovascular accident ([address]     CHF (congestive heart failure) ([address] 7/30/2012    Dr [address] Gao    Chronic lymphocytic leukemia of B-cell type ([address]     Chronic sinusitis     CLL (chronic lymphocytic leukemia) ([address] 12/2012    Dr Falguni He COPD (chronic obstructive pulmonary disease) ([address]     Diabetes mellitus type II, uncontrolled ([address] 7/30/2012    Dyspnea on exertion     Essential tremor 7/30/2012    [facility] Leisa Loco GERD (gastroesophageal reflux disease)     Hyperkalemia     Hyperlipidemia     Hypertension 03/03/2018    LABALBU 3.0 03/03/2018    CREATININE 1.2 03/03/2018    CALCIUM 9.4 03/03/2018    LABGLOM 57 03/03/2018    GLUCOSE 113 03/03/2018    GLUCOSE 170 01/04/2018       IMPRESSION/RECOMMENDATIONS:      Acute on chronic hypoxemic respiratory failure  Acute on chronic hypercapnic respiratory failure  Influenza B pneumonia without bacterial superinfection  Acute on chronic diastolic CHF  Pleural effusion s/p thoracentesis  COPD without exacerbation  Lung cancer currently undergoing treatment    Plan:  The patient wears an NIV at home, and while in the hospital requires a BiPAP due to severe Co2 retention that causes a severe altered mental status within usually 48 hours of not wearing his machine. Will order bipap 22/6 with spo2 to be kept at 92% - these are the settings that kept him doing ok last visit. The patient will continue with tamiflu and diuretic therapy. Mobilize with ptot so he can continue to keep his strength,  Monitor fluid results from thoracentesis and his oxygenation status - wean as tolerated. The patient's mental status will also be monitored. I will continue to follow along with this patient and make further recommendations as his care progresses. I will see him again on Monday - please call me questions in the interim.

## 2018-03-03 NOTE — PLAN OF CARE
Problem: Impaired respiratory status  Goal: Clear lung sounds  Clear lung sounds     Outcome: Ongoing  Diminished lungs. Able to use acapela in room. Problem: Falls - Risk of  Goal: Absence of falls  Outcome: Ongoing  Up with assist and walker. Strict use of alarms. Able to voice needs, uses call light appropriately. Discussed hourly rounding, increased as needed. Problem: Discharge Planning:  Goal: Participates in care planning  Participates in care planning   Outcome: Ongoing  [facility]. Continue to assess discharge needs as they arise. Goal: Discharged to appropriate level of care  Discharged to appropriate level of care   Outcome: Ongoing  See above    Problem: Pain:  Goal: Pain level will decrease  Pain level will decrease   Outcome: Ongoing  0-10 pain scale explained and utilized. Able to voice pain concerns, no complaints this shift. Assisting with repositioning as needed. Goal: Recognizes and communicates pain  Recognizes and communicates pain   Outcome: Ongoing  See above  Goal: Control of acute pain  Control of acute pain   Outcome: Ongoing  See above  Goal: Control of chronic pain  Control of chronic pain   Outcome: Ongoing  See above    Problem: Skin Integrity - Impaired:  Goal: Will show no infection signs and symptoms  Will show no infection signs and symptoms   Outcome: Ongoing  Encouraged/assisted with turns frequently able to turn self as well. Goal: Absence of new skin breakdown  Absence of new skin breakdown   Outcome: Ongoing  See above    Problem: Airway Clearance - Ineffective:  Goal: Ability to maintain a clear airway will improve  Ability to maintain a clear airway will improve   Outcome: Ongoing  Able to cough and deep breath. Lungs assessed every 4 hours prn. Problem: Gas Exchange - Impaired:  Goal: Levels of oxygenation will improve  Levels of oxygenation will improve   Outcome: Ongoing  VS every 4 hours prn.  Continues with 4L NC.

## 2018-03-04 NOTE — PLAN OF CARE
Problem: Impaired respiratory status  Goal: Clear lung sounds  Clear lung sounds     Outcome: Ongoing  Continue therapy as ordered to improve breath sounds/aeration and pulmonary toilet.

## 2018-03-05 NOTE — PROGRESS NOTES
cleaning tasks)       Ambulation Assistance: Independent  Transfer Assistance: Independent    Active : No  Additional Comments: 2.5 L O2 at home    Objective        Overall Cognitive Status: WNL         Sensation  Overall Sensation Status: WFL                           LUE AROM (degrees)  LUE AROM : WFL          RUE AROM (degrees)  RUE AROM : WFL       LUE Strength  LUE Strength Comment: 4/5 grossly                RUE Strength  RUE Strength Comment: 4-/5 grossly                     RUE Tone: Normotonic  LUE Tone: Normotonic                    ADL  Grooming: Stand by assistance (wash hands and complete oral care, stood X 4 minutes, forward flexed posture resting forearms on sink, increased SOB noted, with pt needing seated rest break after)  Toileting: Stand by assistance (clothing management and hygiene)          Transfers  Sit to stand: Stand by assistance (from recliner, EOB)  Stand to sit: Stand by assistance (to recliner, EOB)  Toilet Transfers  Equipment Used: Grab bars  Toilet Transfer: Stand by assistance    Balance  Sitting Balance: Supervision  Standing Balance: Stand by assistance     Time: 1 minute X 2 events  Activity: prep for mobility  Comment: min cues for more upright posture     Functional Mobility  Functional - Mobility Device: Rolling Walker  Activity: To/from bathroom, Other  Assist Level: Contact guard assistance (-close SBA)  Functional Mobility Comments: in room, slow steady pace, increased SOB noted, min cues for breathign technique with good return, min cues for walker safety when maneuvering in bathroom     Type of ROM/Therapeutic Exercise  Comment: issued yellow theraband with pt completed 10 reps of horizontal abd seated in recliner, increased SOB noted, educated on shoulder flexion exercises with pt planning to complete later.               Activity Tolerance:  Activity Tolerance: Patient Tolerated treatment well  Activity Tolerance: increased SOB with activity, vc for breathing

## 2018-03-05 NOTE — PROGRESS NOTES
Department of Internal Medicine  Pulmonary  Attending Progress Note      SUBJECTIVE:  Pt seen and examined. Pt resting at the side of the bed using his inventive spirometry with nursing a bedside. The patient states he's feeling better but still rough. The patient refused his 9pm dose of diuretic due to having to get up every 1-2 hours to urinate and not getting any sleep. Not having any productive cough, denies chest pain, n,v,d or other complaints at this time.     OBJECTIVE      Medications    Current Facility-Administered Medications: metolazone (ZAROXOLYN) tablet 5 mg, 5 mg, Oral, Daily  oxyCODONE-acetaminophen (PERCOCET) 5-325 MG per tablet 1 tablet, 1 tablet, Oral, Q4H PRN  guaiFENesin (MUCINEX) extended release tablet 600 mg, 600 mg, Oral, BID  furosemide (LASIX) injection 40 mg, 40 mg, Intravenous, TID  potassium chloride (KLOR-CON M) extended release tablet 40 mEq, 40 mEq, Oral, PRN **OR** potassium chloride 20 MEQ/15ML (10%) oral solution 40 mEq, 40 mEq, Oral, PRN **OR** potassium chloride 10 mEq/100 mL IVPB (Peripheral Line), 10 mEq, Intravenous, PRN  magnesium sulfate 1 g in dextrose 5 % 100 mL IVPB, 1 g, Intravenous, PRN  sodium phosphate 18.12 mmol in dextrose 5 % 250 mL IVPB, 0.16 mmol/kg, Intravenous, PRN **OR** sodium phosphate 36.27 mmol in dextrose 5 % 250 mL IVPB, 0.32 mmol/kg, Intravenous, PRN  acetaminophen (TYLENOL) tablet 650 mg, 650 mg, Oral, Q4H PRN  aspirin EC tablet 81 mg, 81 mg, Oral, Daily  nitroGLYCERIN (NITROSTAT) SL tablet 0.4 mg, 0.4 mg, Sublingual, Q5 Min PRN  amiodarone (CORDARONE) tablet 200 mg, 200 mg, Oral, Daily  albuterol (PROVENTIL) nebulizer solution 2.5 mg, 2.5 mg, Nebulization, Q4H PRN  beclomethasone (QVAR) 80 MCG/ACT inhaler 2 puff, 2 puff, Inhalation, BID  insulin lispro protamine & lispro (HUMALOG MIX) (75-25) 100 UNIT per ML injection vial SUSP 10 Units, 10 Units, Subcutaneous, BID WC  atorvastatin (LIPITOR) tablet 80 mg, 80 mg, Oral, Daily  metoprolol

## 2018-03-05 NOTE — PROGRESS NOTES
DVT.  6. Chronic back pain. PRN percocet for pain. Diet: DIET CARB CONTROL; Carb Control: 3 carbs/meal (approximate 1500 kcals/day); Low Sodium (2 GM); Daily Fluid Restriction: 2000 ml    Code status: DNR-CC     ----------  Subjective  Shortness of breath, cough better per patient. Objective  Physical exam:  Vitals: BP (!) 141/64   Pulse 60   Temp 97.6 °F (36.4 °C) (Oral)   Resp 19   Ht 5' 11\" (1.803 m)   Wt 246 lb 4.8 oz (111.7 kg)   SpO2 96%   BMI 34.35 kg/m²   Gen: Not in distress. Alert. Oriented. Head: Normocephalic. Atraumatic. Eyes: EOMI. Good acuity. ENT: Oral mucosa moist  Neck: No JVD. No obvious thyromegaly. CVS: Nml S1S2, no MRG, RRR  Pulmomary: Bibasilar crackles, diminished right base. Gastrointestinal: Soft, NT/ND. Positive bowel sounds. Musculoskeletal: Bilateral LE edema, worse on right. Warm  Neuro: No focal deficit. Moves extremity spontaneously. Psychiatry: Appropriate affect. Not agitated. Skin: Warm, dry with normal turgor. No rash      24HR INTAKE/OUTPUT:      Intake/Output Summary (Last 24 hours) at 03/05/18 1518  Last data filed at 03/05/18 1340   Gross per 24 hour   Intake              860 ml   Output             2350 ml   Net            -1490 ml     I/O last 3 completed shifts: In: 18 [P.O.:850; I.V.:10]  Out: 2350 [Urine:2350]  No intake/output data recorded.       Meds:    furosemide  60 mg Intravenous BID    metolazone  5 mg Oral Daily    guaiFENesin  600 mg Oral BID    aspirin  81 mg Oral Daily    amiodarone  200 mg Oral Daily    beclomethasone  2 puff Inhalation BID    insulin lispro protamine & lispro  10 Units Subcutaneous BID WC    atorvastatin  80 mg Oral Daily    metoprolol  100 mg Oral BID    clopidogrel  75 mg Oral Daily    methenamine  1 g Oral Daily    sodium chloride flush  10 mL Intravenous 2 times per day    enoxaparin  40 mg Subcutaneous Q24H    albuterol  2.5 mg Nebulization Q4H WA    oseltamivir  30 mg Oral BID       Infusions:    dextrose           PRN Meds: oxyCODONE-acetaminophen, potassium chloride **OR** potassium chloride **OR** potassium chloride, magnesium sulfate, sodium phosphate IVPB **OR** sodium phosphate IVPB, acetaminophen, nitroGLYCERIN, albuterol, sodium chloride flush, magnesium hydroxide, ondansetron, glucose, dextrose, glucagon (rDNA), dextrose    Labs/imaging:  CBC:   Recent Labs      03/03/18   0622 03/04/18   0528  03/05/18   0557   WBC  8.7  8.4  9.5   HGB  10.0*  9.4*  10.2*   PLT  148  131  134         BMP:    Recent Labs      03/03/18   0622 03/04/18   0528  03/05/18   0557   NA  145  142  147*   K  4.7  4.7  4.4   CL  105  103  104   CO2  29  32  34*   BUN  40*  41*  39*   CREATININE  1.2  1.4*  1.2   GLUCOSE  113*  104  121*     Troponin:   No results for input(s): TROPONINI in the last 72 hours.       Shannon Ford MD  -------------------------------  [facility] hospitalist

## 2018-03-06 PROBLEM — J44.1 COPD EXACERBATION (HCC): Status: ACTIVE | Noted: 2018-01-01

## 2018-03-06 NOTE — PROGRESS NOTES
(LOPRESSOR) tablet 100 mg, 100 mg, Oral, BID  clopidogrel (PLAVIX) tablet 75 mg, 75 mg, Oral, Daily  methenamine (HIPREX) tablet 1 g, 1 g, Oral, Daily  sodium chloride flush 0.9 % injection 10 mL, 10 mL, Intravenous, 2 times per day  sodium chloride flush 0.9 % injection 10 mL, 10 mL, Intravenous, PRN  magnesium hydroxide (MILK OF MAGNESIA) 400 MG/5ML suspension 30 mL, 30 mL, Oral, Daily PRN  ondansetron (ZOFRAN) injection 4 mg, 4 mg, Intravenous, Q6H PRN  enoxaparin (LOVENOX) injection 40 mg, 40 mg, Subcutaneous, Q24H  albuterol (PROVENTIL) nebulizer solution 2.5 mg, 2.5 mg, Nebulization, Q4H WA  glucose (GLUTOSE) 40 % oral gel 15 g, 15 g, Oral, PRN  dextrose 50 % solution 12.5 g, 12.5 g, Intravenous, PRN  glucagon (rDNA) injection 1 mg, 1 mg, Intramuscular, PRN  dextrose 5 % solution, 100 mL/hr, Intravenous, PRN  Physical    VITALS:  /60   Pulse 61   Temp 97.9 °F (36.6 °C) (Oral)   Resp 19   Ht 5' 11\" (1.803 m)   Wt 243 lb 4.8 oz (110.4 kg)   SpO2 96%   BMI 33.93 kg/m²     CONSTITUTIONAL:  awake, alert, cooperative, no apparent distress, and appears stated age  EYES:  Lids and lashes normal, pupils equal, round and reactive to light, extra ocular muscles intact, sclera clear, conjunctiva normal  ENT:  Normocephalic, without obvious abnormality, atraumatic, sinuses nontender on palpation, external ears without lesions, oral pharynx with moist mucus membranes, tonsils without erythema or exudates, gums normal and good dentition.   NECK:  Supple, symmetrical, trachea midline, no adenopathy, thyroid symmetric, not enlarged and no tenderness, skin normal  BACK:  Symmetric, no curvature, spinous processes are non-tender on palpation, paraspinous muscles are non-tender on palpation, no costal vertebral tenderness  LUNGS:  No increased work of breathing, good air exchange, clear to auscultation bilaterally, no crackles or wheezing.  However decreased sounds throughout bases more than apices  CARDIOVASCULAR:  Normal apical impulse, regular rate and rhythm, normal S1 and S2, no S3 or S4, and no murmur noted  ABDOMEN:  No scars, normal bowel sounds, soft, non-distended, non-tender, no masses palpated, no hepatosplenomegally  MUSCULOSKELETAL:  There is no redness, warmth, or swelling of the joints.  Full range of motion noted.  Motor strength is 5 out of 5 all extremities bilaterally.  Tone is normal.  NEUROLOGIC:  Awake, alert, oriented to name, place and time.  Cranial nerves II-XII are grossly intact.  Motor is 5 out of 5 bilaterally.  Cerebellar finger to nose, heel to shin intact.  Sensory is intact.  Babinski down going, Romberg negative, and gait is normal.  SKIN:  no bruising or bleeding    Data    CBC:   Lab Results   Component Value Date    WBC 8.5 03/06/2018    RBC 3.16 03/06/2018    HGB 10.2 03/06/2018    HCT 30.1 03/06/2018    MCV 95.3 03/06/2018    MCH 32.3 03/06/2018    MCHC 33.9 03/06/2018    RDW 15.8 03/06/2018     03/06/2018    MPV 7.5 03/06/2018     BMP:    Lab Results   Component Value Date     03/06/2018    K 4.5 03/06/2018    K 4.8 03/02/2018     03/06/2018    CO2 33 03/06/2018    BUN 32 03/06/2018    LABALBU 2.8 03/06/2018    CREATININE 1.0 03/06/2018    CALCIUM 9.4 03/06/2018    LABGLOM 70 03/06/2018    GLUCOSE 113 03/06/2018    GLUCOSE 170 01/04/2018       ASSESSMENT AND PLAN      Acute on chronic hypoxemic respiratory failure  Acute on chronic hypercapnic respiratory failure  Influenza B pneumonia without bacterial superinfection  Acute on chronic diastolic CHF  Pleural effusion s/p thoracentesis  COPD with acute exacerbation  Lung cancer currently undergoing treatment    Pt overall slowly improving, but is more tight today will start him on prednisone 40mg daily for steroids, continue the nebulizers and qvar for now. The patient encouraged to use the bipap PRN when he's getting short of breath and wear it at night.   The patient to continue to work with PTOT and get up and move around as this will help him. Continue working closure to discharge likely will need SNF again. Flutter and IS and monitor closely.

## 2018-03-06 NOTE — PROGRESS NOTES
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  Pt in chair and agreeable to therapy, pleasant and cooperative, does fatigue quickly    Overall Orientation Status: Within Functional Limits    Pain:  Pain Assessment  Patient Currently in Pain: Denies       Objective  Overall Cognitive Status: WNL    ADL  Toileting: Stand by assistance     Bed mobility  Sit to Supine: Minimal assistance (To lift B LEs into bed. )    Transfers  Sit to stand: Minimal assistance (From recliner and toilet with min vc for technique. )  Stand to sit: Stand by assistance (onto toilet and EOB. )  Toilet Transfers  Toilet - Technique: Ambulating  Equipment Used: Grab bars  Toilet Transfer: Minimal assistance    Balance  Sitting Balance: Supervision  Standing Balance: Stand by assistance     Time: 1 minute X 2 events  Activity: prep for mobility  Comment: min cues for more upright posture     Functional Mobility  Functional - Mobility Device: Rolling Walker  Activity: standing task with S for > 4 minutes with 2h and release and no LOB to improve safety with IADls  Short term goal 4: Pt will complete BADL routine Mod I with EC strategeis prn with no cues for safety   Short term goal 5: Pt will demo indep with BUE strengthening HEP to increase UB strength and activity tolerance needed for ease iwth functional transfers and ADLs  Long term goals  Time Frame for Long term goals : No LTG established d/t short ELOS

## 2018-03-06 NOTE — PROGRESS NOTES
Hospital Medicine Progress Note     Date:  3/6/2018    PCP: Jerman Mandel MD (Tel: [phone])    Date of Admission: 3/1/2018      Brief hospital course: Patient with hx of lung cancer, with ongoing radiation treatment as outpatient, who presents with complaints of worsening shortness of breath, nonproductive cough, worsening lower extremity edema, admitted with influenza B bronchitis, CHF exacerbation and acute on chronic respiratory failure. Assessment:  Principal Problem:    Acute on chronic respiratory failure with hypoxia (HCC)  Active Problems:    Acute on chronic diastolic heart failure (HCC)    Influenza B bronchitis    History of lung cancer    COPD exacerbation (HCC)    Bilateral pleural effusion, worse on right    Coronary artery disease involving native coronary artery of native heart without angina pectoris    Diabetes mellitus type 2, controlled (HCC)    Chronic renal failure, stage 3 (moderate)    Obesity (BMI 30-39. 9)  Resolved Problems:    Acute on chronic renal failure, POA        Plan:  1. Acute on chronic respiratory failure. Secondary to influenza bronchitis, CHF exacerbation and now COPD exacerbation. Uses NIV at home, requires BiPAP in hospital due to severe CO2 retention, per his pulmonologist. On BiPAP per pulm. Titrate oxygen as tolerated. 2. CHF exacerbation. Continue IV lasix as ordered; refused second dose of IV lasix last night - states too close to bedtime and did not want to have to get up to urinate a lot. Continue BB. Maintain on fluid restrictions. Strict in/out. 3. Moderate right pleural effusion. Hx of lung cancer with noted DIANA, lingula, right lower lobe mass-like consolidations. S/p right thoracentesis with removal of 1.2 L of fluid; cytology negative for malignancy. 4. COPD exacerbation. On breathing rx, steroid. 5. Influenza B bronchitis/pneumonia. Completed course of tamiflu. Cough meds as ordered. 6. Asymmetric bilateral LE edema, worse on right. Q4H WA       Infusions:    dextrose           PRN Meds: oxyCODONE-acetaminophen, potassium chloride **OR** potassium chloride **OR** potassium chloride, magnesium sulfate, sodium phosphate IVPB **OR** sodium phosphate IVPB, acetaminophen, nitroGLYCERIN, albuterol, sodium chloride flush, magnesium hydroxide, ondansetron, glucose, dextrose, glucagon (rDNA), dextrose    Labs/imaging:  CBC:   Recent Labs      03/04/18 0528 03/05/18 0557  03/06/18   0619   WBC  8.4  9.5  8.5   HGB  9.4*  10.2*  10.2*   PLT  131  134  131         BMP:    Recent Labs      03/04/18 0528 03/05/18 0557  03/06/18   0619   NA  142  147*  144   K  4.7  4.4  4.5   CL  103  104  101   CO2  32  34*  33   BUN  41*  39*  32*   CREATININE  1.4*  1.2  1.0   GLUCOSE  104  121*  113*     Troponin:   No results for input(s): TROPONINI in the last 72 hours.       Patience Lawrence MD  -------------------------------  Rounding hospitalist

## 2018-03-06 NOTE — FLOWSHEET NOTE
Pt said happily that he will be going home tomorrow. He was in good spirits and was anointed. 03/06/18 1524   Encounter Summary   Services provided to: Patient   Referral/Consult From: Rounding   Place of Church Worship   Continue Visiting Yes  (2/6 )   Complexity of Encounter Moderate   Length of Encounter 30 minutes   Spiritual/Taoism   Type Spiritual support   Assessment Approachable;Calm; Hopeful   Intervention Active listening;Nurtured hope;Prayer   Outcome Connection/belonging;Expressed gratitude;Expressed feelings/needs/concerns;Encouraged; Hopeful   Sacraments   Sacrament of Sick-Anointing Anointed

## 2018-03-06 NOTE — PLAN OF CARE
Problem: Falls - Risk of  Goal: Absence of falls  Outcome: Ongoing  Pt did not fall. Pt has bed/chair alarm. RN and tech hourly round. Pt uses call light if needed     Problem: Discharge Planning:  Goal: Participates in care planning  Participates in care planning   Outcome: Ongoing  Pt plans on going home with [facility]. Home Bipap should be there when he gets d/c     Problem: Pain:  Goal: Pain level will decrease  Pain level will decrease   Outcome: Ongoing  Pt has denied pain.      Problem: Gas Exchange - Impaired:  Goal: Levels of oxygenation will improve  Levels of oxygenation will improve   Outcome: Ongoing  Pt is still on oxygen. Oxygen is 4L nasal canula. Pt will need home O2 eval      Comments: Care plan reviewed with patient and family. Patient and family verbalize understanding of the plan of care and contribute to goal setting.

## 2018-03-06 NOTE — PLAN OF CARE
Problem: Impaired respiratory status  Goal: Clear lung sounds  Clear lung sounds     Outcome: Ongoing  Patient was clear, but diminished throughout all lung fields. Patient is receiving Albuterol and Qvar to help achieve clear lung sounds.

## 2018-03-07 NOTE — PLAN OF CARE
Problem: Impaired respiratory status  Goal: Clear lung sounds  Clear lung sounds     Outcome: Ongoing  Continue therapy as ordered to improve breaths sounds/aeration and pulmonary toilet. Pt currently on 4 lpm/nc.

## 2018-03-07 NOTE — PROGRESS NOTES
Discharge teaching and instructions for diagnosis/procedure of influenza and pneumonia completed with patient using teachback method. AVS reviewed. Printed prescriptions given to patient. Patient voiced understanding regarding prescriptions, follow up appointments, and care of self at home. Discharged ambulatory and in a wheelchair to  home with support per family. Son at bedside while instructions were given. All belongings collected and sent home with patient.

## 2018-03-07 NOTE — PLAN OF CARE
Problem: Impaired respiratory status  Goal: Clear lung sounds  Clear lung sounds     Outcome: Ongoing  Albuterol txs ongoing to improve aeration  Goal: Normal spontaneous ventilation  Outcome: Ongoing  Pt wearing bipap HS  Goal: Absence of new skin breakdown  Outcome: Ongoing  No skin break down noted this shift.

## 2018-03-07 NOTE — PROGRESS NOTES
[address] [address]  PHYSICAL THERAPY MISSED TREATMENT NOTE  ACUTE CARE  STRZ RENAL TELEMETRY 6K              Missed Treatment  Pt recently discharged to home.

## 2018-03-07 NOTE — PROGRESS NOTES
CLINICAL PHARMACY: DISCHARGE MED RECONCILIATION/REVIEW    [facility] Select Patient?: Yes  Total # of Interventions Recommended: 0   -   Total # Interventions Accepted: 0  Intervention Severity:   - Level 1 Intervention Present?: No   - Level 2 #: 0   - Level 3 #: 0   Time Spent (min): [address] [place] PharmD 3/7/2018 12:39 PM

## 2018-03-07 NOTE — DISCHARGE SUMMARY
[facility])  Active Problems:    Acute on chronic diastolic heart failure (HCC)    Influenza B bronchitis    History of lung cancer    COPD exacerbation (HCC)    Bilateral pleural effusion, worse on right    Coronary artery disease involving native coronary artery of native heart without angina pectoris    Diabetes mellitus type 2, controlled (HCC)    Chronic renal failure, stage 3 (moderate)    Obesity (BMI 30-39. 9)  Resolved Problems:    Acute on chronic renal failure, POA      Physical Exam: BP (!) 157/68   Pulse 60   Temp 98.2 °F (36.8 °C) (Oral)   Resp 22   Ht 5' 11\" (1.803 m)   Wt 233 lb 9.6 oz (106 kg)   SpO2 93%   BMI 32.58 kg/m²   Gen/overall appearance: Not in acute distress. Alert. Oriented. Head: Normocephalic, atraumatic  Eyes: EOMI, good acuity  ENT:- Oral mucosa moist  Neck: No JVD, thyromegaly  CVS: Nml S1S2, no MRG, RRR  Pulm: Diminished bilaterally. Clear upper lungs  Gastrointestinal: Soft, NT/ND, +BS  Musculoskeletal: Slightly improved bilateral LE edema. Warm  Neuro: No focal deficit. Moves extremity spontaneously. Psychiatry: Appropriate affect. Not agitated. Skin: Warm, dry with normal turgor. No rash        Significant diagnostic studies that may require follow up:  Cta Chest W Wo Contrast    Result Date: 3/1/2018  PROCEDURE: CTA CHEST W WO CONTRAST CLINICAL INFORMATION: DYSPNEA, ON EXERTION, . COMPARISON: No prior study. TECHNIQUE: 1.5 mm axial images were obtained through the chest after the administration of IV contrast.  A non-contrast localizer was obtained. 3D reconstructions were performed on the scanner to include sagittal and bilateral oblique images through the  chest. 80 mL Isovue-370 were administered intravenously. All CT scans at this facility use dose modulation, iterative reconstruction, and/or weight-based dosing when appropriate to reduce radiation dose to as low as reasonably achievable. FINDINGS: No acute pulmonary artery embolism. Lungs:  Moderate sized right greater Specimen sent for laboratory studies as requested. Ultrasound-guided thoracentesis with approximately 1.2 L of fluid drained. **This report has been created using voice recognition software. It may contain minor errors which are inherent in voice recognition technology. ** Final report electronically signed by Dr. Talia Araujo on 3/2/2018 5:35 PM    Xr Chest Pa Inspiration 1 Vw    Result Date: 3/2/2018  PROCEDURE: XR CHEST PA INSPIRATION 1 VW CLINICAL INFORMATION: Dyspnea, unspecified type. COMPARISON: January 14, 2018 TECHNIQUE: AP Portable chest xray FINDINGS: Patchy opacity in the right upper lobe. This is new when compared to prior study. Correlate for atelectasis, infiltrate. Interstitial markings within the lung bases, more pronounced when compared to prior study. Correlate for interstitial edema or infiltrate. Persistent left upper lobe atelectasis or infiltrate. Small bilateral pleural effusions. Prominent mediastinum, stable in appearance from prior study likely related to ectatic, atherosclerotic thoracic aorta. Cardiomegaly. Osseous structures are unremarkable. Right chest wall pacer. Median sternotomy wires. INTERVAL CHANGE: No pneumothorax identified. Interval development of right upper lobe opacity and increased interstitial markings bilaterally. Correlate for infiltrate/infection or edema. Interval development of left lower lobe consolidation or effusion. Persistent left upper lobe atelectasis or infiltrate. **This report has been created using voice recognition software. It may contain minor errors which are inherent in voice recognition technology. ** Final report electronically signed by Dr. Art Bingham on 3/2/2018 1:37 PM            Treatments: As above.       Discharge Medications:     Medication List      START taking these medications    bumetanide 2 MG tablet  Commonly known as:  BUMEX  Take 1 tablet by mouth daily     guaiFENesin 600 MG extended release tablet  Commonly known as: MUCINEX  Take 1 tablet by mouth 2 times daily     metolazone 5 MG tablet  Commonly known as:  ZAROXOLYN  Take 1 tablet by mouth daily  Start taking on:  3/8/2018     predniSONE 20 MG tablet  Commonly known as:  DELTASONE  Take 2 tablets by mouth daily for 4 days  Start taking on:  3/8/2018        CONTINUE taking these medications    albuterol (2.5 MG/3ML) 0.083% nebulizer solution  Commonly known as:  PROVENTIL  Take 3 mLs by nebulization every 4 hours as needed for Wheezing     amiodarone 200 MG tablet  Commonly known as:  CORDARONE     aspirin 81 MG tablet     atorvastatin 80 MG tablet  Commonly known as:  LIPITOR  TAKE ONE TABLET BY MOUTH ONCE DAILY     clopidogrel 75 MG tablet  Commonly known as:  PLAVIX  TAKE ONE TABLET BY MOUTH ONCE DAILY     COQ10 PO     Cranberry 500 MG Tabs     fish oil-omega-3 fatty acids 1000 MG capsule     Insulin Pen Needle 31G X 8 MM Misc  Commonly known as:  B-D ULTRAFINE III SHORT PEN  Inject insulin SQ 2 x daily (Dx: E11.65)     Lancets Misc  Check 2x/day     lisinopril 20 MG tablet  Commonly known as:  PRINIVIL;ZESTRIL  Take 1 tablet by mouth daily     methenamine 1 g tablet  Commonly known as:  HIPREX     metoprolol 100 MG tablet  Commonly known as:  LOPRESSOR     nitroGLYCERIN 0.4 MG SL tablet  Commonly known as:  NITROSTAT  Place 1 tablet under the tongue every 5 minutes as needed for Chest pain     NOVOLOG MIX 70/30 FLEXPEN (70-30) 100 UNIT/ML injection  Generic drug:  insulin aspart protamine-insulin aspart     ONE TOUCH ULTRA MINI w/Device Kit  1 kit by Does not apply route daily Dx E11.8     ONE TOUCH ULTRA TEST strip  Generic drug:  glucose blood VI test strips  Check blood sugar twice daily (Dx: E11.8)     PULMICORT FLEXHALER 180 MCG/ACT Aepb inhaler  Generic drug:  budesonide     terazosin 5 MG capsule  Commonly known as:  HYTRIN  TAKE ONE CAPSULE BY MOUTH ONCE EVERY NIGHT     therapeutic multivitamin-minerals tablet        STOP taking these medications    furosemide 40 MG tablet  Commonly known as:  LASIX     hydrALAZINE 10 MG tablet  Commonly known as:  APRESOLINE           Where to Get Your Medications      These medications were sent to [facility], [address] [facility], [address]    Phone:  [phone]   · bumetanide 2 MG tablet  · guaiFENesin 600 MG extended release tablet  · metolazone 5 MG tablet  · predniSONE 20 MG tablet         Activity: activity as tolerated  Diet: DIET CARB CONTROL; Carb Control: 3 carbs/meal (approximate 1500 kcals/day); Low Sodium (2 GM); Daily Fluid Restriction: 2000 ml      Disposition: home with [address] [address]  Discharged Condition: Stable  Follow Up:   [address]  [address] [place] [address]  [phone]    On 3/13/2018  appointment at 11:30 am      [place] [address]  [address], [address]  [phone]    On 5/31/2018  appointment at 10:30 am, call office if need to be seen sooner        Code status:  DNR-CC       Total time spent on discharge, finalizing medications, referrals and arranging outpatient follow up was more than 30 minutes      Thank you Dr. Paulette Seaman MD for the opportunity to be involved in this patients care.

## 2018-03-07 NOTE — PROGRESS NOTES
Department of Internal Medicine  Pulmonary  Attending Progress Note      SUBJECTIVE:  Pt seen and examined. The patient resting comfortably in bed with bipap on. The patient states he's starting to finally feel a lot better. The patient states less sob when getting around and not as much wheezing. The patient is feeling less muscle aches and overall better. No chest pain, n, v, d or other complaints at this time.     OBJECTIVE      Medications    Current Facility-Administered Medications: predniSONE (DELTASONE) tablet 40 mg, 40 mg, Oral, Daily  furosemide (LASIX) injection 60 mg, 60 mg, Intravenous, BID  metolazone (ZAROXOLYN) tablet 5 mg, 5 mg, Oral, Daily  oxyCODONE-acetaminophen (PERCOCET) 5-325 MG per tablet 1 tablet, 1 tablet, Oral, Q4H PRN  guaiFENesin (MUCINEX) extended release tablet 600 mg, 600 mg, Oral, BID  potassium chloride (KLOR-CON M) extended release tablet 40 mEq, 40 mEq, Oral, PRN **OR** potassium chloride 20 MEQ/15ML (10%) oral solution 40 mEq, 40 mEq, Oral, PRN **OR** potassium chloride 10 mEq/100 mL IVPB (Peripheral Line), 10 mEq, Intravenous, PRN  magnesium sulfate 1 g in dextrose 5 % 100 mL IVPB, 1 g, Intravenous, PRN  sodium phosphate 18.12 mmol in dextrose 5 % 250 mL IVPB, 0.16 mmol/kg, Intravenous, PRN **OR** sodium phosphate 36.27 mmol in dextrose 5 % 250 mL IVPB, 0.32 mmol/kg, Intravenous, PRN  acetaminophen (TYLENOL) tablet 650 mg, 650 mg, Oral, Q4H PRN  aspirin EC tablet 81 mg, 81 mg, Oral, Daily  nitroGLYCERIN (NITROSTAT) SL tablet 0.4 mg, 0.4 mg, Sublingual, Q5 Min PRN  amiodarone (CORDARONE) tablet 200 mg, 200 mg, Oral, Daily  albuterol (PROVENTIL) nebulizer solution 2.5 mg, 2.5 mg, Nebulization, Q4H PRN  beclomethasone (QVAR) 80 MCG/ACT inhaler 2 puff, 2 puff, Inhalation, BID  insulin lispro protamine & lispro (HUMALOG MIX) (75-25) 100 UNIT per ML injection vial SUSP 10 Units, 10 Units, Subcutaneous, BID WC  atorvastatin (LIPITOR) tablet 80 mg, 80 mg, Oral, Daily  metoprolol (LOPRESSOR) tablet 100 mg, 100 mg, Oral, BID  clopidogrel (PLAVIX) tablet 75 mg, 75 mg, Oral, Daily  methenamine (HIPREX) tablet 1 g, 1 g, Oral, Daily  sodium chloride flush 0.9 % injection 10 mL, 10 mL, Intravenous, 2 times per day  sodium chloride flush 0.9 % injection 10 mL, 10 mL, Intravenous, PRN  magnesium hydroxide (MILK OF MAGNESIA) 400 MG/5ML suspension 30 mL, 30 mL, Oral, Daily PRN  ondansetron (ZOFRAN) injection 4 mg, 4 mg, Intravenous, Q6H PRN  enoxaparin (LOVENOX) injection 40 mg, 40 mg, Subcutaneous, Q24H  albuterol (PROVENTIL) nebulizer solution 2.5 mg, 2.5 mg, Nebulization, Q4H WA  glucose (GLUTOSE) 40 % oral gel 15 g, 15 g, Oral, PRN  dextrose 50 % solution 12.5 g, 12.5 g, Intravenous, PRN  glucagon (rDNA) injection 1 mg, 1 mg, Intramuscular, PRN  dextrose 5 % solution, 100 mL/hr, Intravenous, PRN  Physical    VITALS:  BP (!) 143/64   Pulse 61   Temp 98.8 °F (37.1 °C) (Oral)   Resp 10   Ht 5' 11\" (1.803 m)   Wt 233 lb 9.6 oz (106 kg)   SpO2 96%   BMI 32.58 kg/m²     CONSTITUTIONAL:  awake, alert, cooperative, no apparent distress, and appears stated age  EYES:  Lids and lashes normal, pupils equal, round and reactive to light, extra ocular muscles intact, sclera clear, conjunctiva normal  ENT:  Normocephalic, without obvious abnormality, atraumatic, sinuses nontender on palpation, external ears without lesions, oral pharynx with moist mucus membranes, tonsils without erythema or exudates, gums normal and good dentition. NECK:  Supple, symmetrical, trachea midline, no adenopathy, thyroid symmetric, not enlarged and no tenderness, skin normal  BACK:  Symmetric, no curvature, spinous processes are non-tender on palpation, paraspinous muscles are non-tender on palpation, no costal vertebral tenderness  LUNGS:  No increased work of breathing, good air exchange, clear to auscultation bilaterally, no crackles or wheezing.   Improving air movement  CARDIOVASCULAR:  Normal apical impulse, regular rate Exercise and finish tamiflu treatment. The patient will be recommended for SNF from my stand point and he is able from a pulmonary stand point to go to SNF at any time. Will follow until discharge.

## 2018-03-07 NOTE — PROGRESS NOTES
Incontinence     Kidney stone     Lung cancer ([facility] [address]     Malignant neoplasm of upper lobe, left bronchus or lung (HCC)     Muscle weakness     Nephrolithiasis     Nonspecific abnormal finding of lung field     Obesity     Paroxysmal a-fib (HCC)     Pneumonia     Presence of aortocoronary bypass graft     Presence of cardiac and vascular implant and graft     Prostate disease     Shingles     history of    Skin cancer     Dr Gurvinder Sanchez    Squamous cell carcinoma lung, left ([facility] [address] 12/11/2017    Tobacco abuse     history of     Past Surgical History:   Procedure Laterality Date    APPENDECTOMY      BACK SURGERY  1995    discectomy    CARDIAC SURGERY  1992    CABG    CATARACT REMOVAL Bilateral     COLONOSCOPY  11/2005    HERNIA REPAIR      bilateral inguinal    INNER EAR SURGERY      x2 , Dr Latonia Leblanc LIVER BIOPSY      OTHER SURGICAL HISTORY      excision of basal cell carcinoma    OTHER SURGICAL HISTORY  2000    right lower eyelid    PACEMAKER PLACEMENT             Subjective  Chart Reviewed: Yes  Patient assessed for rehabilitation services?: Yes  Response to previous treatment: Patient with no complaints from previous session  Family / Caregiver Present: No    Subjective: Pleasant  Comments: RN approved session. Pt was in bed. He was using 4L of O2. Overall Orientation Status: Within Functional Limits         Pain:  Pain Assessment  Patient Currently in Pain: No       Objective  Overall Cognitive Status: WNL                                                         Coordination  Fine Motor: Mild shakiness noted in B hands. He did drink from a milk carton.            ADL  Grooming: Stand by assistance  Toileting: Supervision (No difficulty noted with clothing management)          Bed mobility  Rolling to Right: Supervision  Supine to Sit: Supervision  Sit to Supine: Unable to assess  Scooting: Supervision    Transfers  Sit to stand: Stand by assistance  Stand to sit: Stand by

## 2018-03-12 NOTE — CARE COORDINATION
[facility] Transitions Follow Up Call    3/12/2018    Patient: Nidia Alcocer  Patient : 1928   MRN: [id number]  Reason for Admission: There are no discharge diagnoses documented for the most recent discharge. Discharge Date: 3/7/18 RARS: Risk Score: 24.75       Spoke with: Dany Ibarra the patient    Care Transitions Subsequent and Final Call    Subsequent and Final Calls  Do you have any ongoing symptoms?:  No  Have your medications changed?:  No  Do you have any questions related to your medications?:  No  Do you currently have any active services?:  Yes  Are you currently active with any services?:  Home Health  Do you have any needs or concerns that I can assist you with?:  No  Identified Barriers:  None  Care Transitions Interventions  No Identified Needs  Other Interventions:        Called & spoke with the patient for the sub transition call. Pt stated he was SOB with activity, using the O2 3L/M continuous. Pt denied any SOB with rest. Pt denied any chest pain, palpitations, cough, fever or swelling. Pt stated weight today 239, yesterday 237. Pt stated he has been sleeping well. Pt denied any needs or concerns. CTC will continue to follow.     Follow Up  Future Appointments  Date Time Provider Kinjal Pate   3/13/2018 11:30 AM MD Nick Baker [facility] - [place]   5/3/2018 1:00 PM Radha Cheng MD [facility] - [address] [facility] [address] Transition Coordinator  [phone]

## 2018-03-21 NOTE — CARE COORDINATION
[facility] Transitions Follow Up Call    3/21/2018    Patient: Moira Kee  Patient : 1928   MRN: [id number]  Reason for Admission: There are no discharge diagnoses documented for the most recent discharge. Discharge Date: 3/7/18 RARS: Risk Score: 24.75       Spoke with: [address] Transitions Subsequent and Final Call    Subsequent and Final Calls  Do you have any ongoing symptoms?:  No  Have your medications changed?:  No  Do you have any questions related to your medications?:  No  Do you currently have any active services?:  Yes  Are you currently active with any services?:  Home Health  Do you have any needs or concerns that I can assist you with?:  No  Identified Barriers:  None  Care Transitions Interventions  No Identified Needs  Other Interventions:        Called pt for the final transition call. Pt appears stable. Pt stated he has \"normal\" SOB, he is able to do his usual activity without any problems. Pt stated he rarely coughs, the cough is non productive. Pt denied any chest pain, tightness or wheeze. Pt reported his weight has been staying about 239-240. Weight today & yesterday 239. Pt understands to call PCP weight gain 3#/day or 5#/week. Pt denied any needs. Pt informed this is the last call from the [facility] , the [facility] will call in a couple weeks.       Follow Up  Future Appointments  Date Time Provider Kinjal Pate   2018 10:20 AM MD [facility] [place]   5/3/2018 1:00 PM [facility] Bales [facility] [address] Transition Coordinator  [phone]

## 2018-03-28 NOTE — CARE COORDINATION
Ambulatory Care Coordination Note  3/28/2018  CM Risk Score: 12  Dione Mortality Risk Score: 76.65    ACC: Lara Sextno RN    Summary Note: Patient was called for continued Care Coordination follow up and education (s/p recent CTC episode). Patient shared he has been doing well. Patient reported his breathing remains at his baseline and he denied any c/o increased SOB, swelling, or cough being present. Patient reported he continues to weigh himself daily and weight remains stable in the 239-240 range. Patient stated BS also remain stable with the average being around 142, but it was slightly higher this AM at 192. Patient believes the higher reading is d/t something he ate last PM.  CHF, COPD, and diabetes education reviewed with patient along with what symptoms he should call and report. Importance of early symptom recognition reviewed with patient. Patient verbalized understanding. Patient stated he remains current with [facility] services, but they plan to discharge him in approx 1 week. Patient continues to manage own medications, but he declined full med review at this time. Patient stated family continues to assist as needed. Patient denied any other questions, concerns, or needs and he was encouraged to call with any that may develop. Will continue to follow up with patient in the future. Care Coordination Interventions    Program Enrollment:  Complex Care  Referral from Primary Care Provider:  No  Suggested Interventions and Community Resources  Diabetes Education:  Completed  Fall Risk Prevention:  Completed  Home Health Services:  Completed (Comment: [facility] - due to be d/c early April )  Medication Assistance Program:  Declined (Comment: denied need at this time )  Medi Set or Pill Pack:  Completed  Transportation Support:  Declined  Zone Management Tools:  Completed  Other Services or Interventions:   Follows with endo         Goals Addressed             Most Recent     Care Coordination Goal: SL tablet Place 1 tablet under the tongue every 5 minutes as needed for Chest pain 5/24/16   Micki Rivera MD   albuterol (PROVENTIL) (2.5 MG/3ML) 0.083% nebulizer solution Take 3 mLs by nebulization every 4 hours as needed for Wheezing 4/18/16   Micki Rivera MD   Lancets MISC Check 2x/day 7/10/13   Nadia Alonso MD   metoprolol (LOPRESSOR) 100 MG tablet Take 100 mg by mouth 2 times daily. Historical Provider, MD   Coenzyme Q10 (COQ10 PO) Take 10 mg by mouth daily     Historical Provider, MD   aspirin 81 MG tablet Take 81 mg by mouth daily. Historical Provider, MD   Cranberry 500 MG TABS Take 1,000 mg by mouth daily     Historical Provider, MD   fish oil-omega-3 fatty acids 1000 MG capsule Take 1 g by mouth daily     Historical Provider, MD   therapeutic multivitamin-minerals (THERAGRAN-M) tablet Take 1 tablet by mouth daily. Historical Provider, MD       Future Appointments  Date Time Provider Kinjal Pate   4[facility]13/2018 10:20 [facility] [place]   5/3/2018 1:00 PM Leanna Russell MD [facility] [place]     ,   Diabetes Assessment    Meal Planning:  Avoidance of concentrated sweets   How often do you test your blood sugar?:  Daily   Do you have barriers with adherence to non-pharmacologic self-management interventions?  (Nutrition/Exercise/Self-Monitoring):  No (Comment: limited assistance )   Have you ever had to go to the ED for symptoms of low blood sugar?:  No       No patient-reported symptoms   Do you have hyperglycemia symptoms?:  No   Do you have hypoglycemia symptoms?:  No   Last Blood Sugar Value:  192   Blood Sugar Monitoring Regimen:  Morning Fasting   Blood Sugar Trends:  Fluctuating      ,   Congestive Heart Failure Assessment    Are you currently restricting fluids?:  No Restriction  Do you understand a low sodium diet?:  Yes     No patient-reported symptoms      Symptoms:   None:  Yes      Symptom course:  stable  Patient-reported weight (lb):  240  Weight trend:

## 2018-05-06 PROBLEM — N17.9 ACUTE KIDNEY FAILURE (HCC): Status: ACTIVE | Noted: 2018-01-01

## 2018-05-07 PROBLEM — N18.30 CKD (CHRONIC KIDNEY DISEASE) STAGE 3, GFR 30-59 ML/MIN (HCC): Status: ACTIVE | Noted: 2018-01-01

## 2018-05-07 PROBLEM — N18.30 ACUTE RENAL FAILURE WITH ACUTE TUBULAR NECROSIS SUPERIMPOSED ON STAGE 3 CHRONIC KIDNEY DISEASE (HCC): Status: ACTIVE | Noted: 2018-01-01

## 2018-05-07 PROBLEM — N17.0 ACUTE RENAL FAILURE WITH ACUTE TUBULAR NECROSIS SUPERIMPOSED ON STAGE 3 CHRONIC KIDNEY DISEASE (HCC): Status: ACTIVE | Noted: 2018-01-01

## 2018-05-07 PROBLEM — J96.11 CHRONIC RESPIRATORY FAILURE WITH HYPOXIA (HCC): Status: ACTIVE | Noted: 2018-01-01

## 2018-05-07 PROBLEM — R55 SYNCOPE AND COLLAPSE: Status: ACTIVE | Noted: 2018-01-01

## 2018-05-07 PROBLEM — N17.9 ACUTE ON CHRONIC RENAL FAILURE (HCC): Status: ACTIVE | Noted: 2018-01-01

## 2018-05-07 PROBLEM — N18.9 ACUTE ON CHRONIC RENAL FAILURE (HCC): Status: ACTIVE | Noted: 2018-01-01

## 2018-05-08 PROBLEM — J96.21 ACUTE ON CHRONIC RESPIRATORY FAILURE WITH HYPOXIA (HCC): Status: ACTIVE | Noted: 2018-01-01

## 2018-05-10 PROBLEM — S40.022A: Status: ACTIVE | Noted: 2018-01-01

## 2018-05-10 PROBLEM — I95.1 ORTHOSTATIC SYNCOPE: Status: ACTIVE | Noted: 2018-01-01

## 2018-05-10 PROBLEM — R53.1 WEAKNESS GENERALIZED: Status: ACTIVE | Noted: 2018-01-01

## 2018-05-11 PROBLEM — E11.22 CKD STAGE 3 DUE TO TYPE 2 DIABETES MELLITUS (HCC): Status: ACTIVE | Noted: 2018-01-01

## 2018-05-12 PROBLEM — R33.9 URINARY RETENTION WITH INCOMPLETE BLADDER EMPTYING: Status: ACTIVE | Noted: 2018-01-01

## 2018-05-19 PROBLEM — H10.33 ACUTE BACTERIAL CONJUNCTIVITIS OF BOTH EYES: Status: ACTIVE | Noted: 2018-01-01

## 2018-07-12 NOTE — TELEPHONE ENCOUNTER
Carito from [facility] calling with urine recheck after 2 weeks    Specific gravity =1.025  PH =5  Leukocytes =1+  Nitrates= +  Protein= trace  Glucose= normal  Ketones= negative   Uro= +  Blood = 250. Cloudy and turbid. Do you want culture sent also? They did keep the urine.

## 2018-07-12 NOTE — CARE COORDINATION
Not Started (Comment: Has been discussed by [facility] RN )  Medication Assistance Program:  Declined (Comment: denied need at this time )  Medi Set or Pill Pack:  Completed  Palliative Care:  Completed (Comment: Thru [facility] )  Physical Therapy:  Completed (Comment: Receiving services thru [facility] )  Transportation Support:  Declined  Zone Management Tools:  Completed  Other Services or Interventions: Follows with endo         Goals Addressed             Most Recent     Care Coordination Goal: Live Independent   On track (7/12/2018)             Care Coordination Goal: Independent Living  Goal: To remain independent at home    Barriers: age    Plan for overcoming my barriers: N/A  Confidence: 7/10                  Prior to Admission medications    Medication Sig Start Date End Date Taking? Authorizing Provider   ONE TOUCH ULTRA TEST strip USE TO CHECK BLOOD SUGAR TWICE DAILY 6/28/18   SEBASTIAN De La O CNP   sulfacetaminde-prednisoLONE (BLEPHAMIDE) 10-0.2 % ophthalmic ointment 4 times daily 4 times daily.     Historical Provider, MD   Incontinence Supplies (BEDSIDE DRAINAGE BAG) MISC Large 2000 cc bedside drainage bag 6/14/18   SEBASTIAN Virk CNP   acetaminophen (TYLENOL) 325 MG tablet Take 2 tablets by mouth every 6 hours as needed for Pain or Fever 5/25/18   Darlene Martell MD   tamsulosin [facility]) 0.4 MG capsule Take 2 capsules by mouth daily 5/25/18   Darlene Martell MD   insulin glargine (LANTUS SOLOSTAR) 100 UNIT/ML injection pen Inject 12 Units into the skin nightly Additional RF per Dr Niru Robb 5/25/18   Darlene Martell MD   methenamine (HIPREX) 1 g tablet Take 1 tablet by mouth 2 times daily 4/23/18   Yun Lewis MD   amiodarone (CORDARONE) 200 MG tablet Take 1 tablet by mouth daily 4/23/18   Yun Lewis MD   guaiFENesin (MUCINEX) 600 MG extended release tablet Take 1 tablet by mouth 2 times daily 3/7/18   Michelle West MD   budesonide (PULMICORT FLEXHALER) 180 MCG/ACT AEPB inhaler

## 2018-07-13 NOTE — TELEPHONE ENCOUNTER
Sugar calling from [facility], she states pt had a 5 lb weight gain in 2 weeks, he has been having SOB, diminished lung sounds, chest pain and nose bleeds, all of this on and off. He is doing breathing treatments. She did 3 days of bumex last week which he did have relief. She restarted the bumex again today. She is asking if you have any advice on what to do next. She is going to go see him again in 1 week. Also pt wants to go on hospice, his son zo is okay with what ever the pt wants but the rest of the family is against it. Please advise.

## 2018-07-16 NOTE — TELEPHONE ENCOUNTER
Call pt and let them know that urine culture returned showing UTI. Script sent in for Bactrim bid x 7 days to [facility]. Push water. Recheck urine dip in 2 weeks. Also call [facility] and let them know above. See if they can check urine dip in 2 weeks on the pt.

## 2018-07-16 NOTE — TELEPHONE ENCOUNTER
Spoke with Chandu Campos from [facility], left message to have James call office back regarding results.

## 2018-07-17 NOTE — TELEPHONE ENCOUNTER
Spoke with Nahum Brittno. Shalom Smith states [facility] nurse was out to see patient yesterday 7/16. Patient is weak and breathing is still poor. Swelling has improved-patient has been on bumex daily x 1 week. Shalom Smith is asking if patient could continue bumex daily as this helps with swelling. Nurse will be going out to see patient again on Thursday.

## 2018-07-20 NOTE — TELEPHONE ENCOUNTER
I spoke with Shira Babinski myself and he seems to think the SOB is at his baseline.   Swelling does come and go over the years as well  Over the phone he does not sound anymore SOB then his usual  He reports not wanting to switch to hospice at this time

## 2018-07-28 PROBLEM — R16.0 LIVER MASS: Status: ACTIVE | Noted: 2018-01-01

## 2018-07-28 NOTE — CONSULTS
[id number] CONSULT            Patient: Dorothy Martino  : 1928  Acct#: [de-identified]  Consult seen for:   Dorothy Martino is a 80 y.o. , male with liver masses, new onset. ASSESSMENT:     1. Liver masses,  newly visualized on CT scan with underlying lung cancer had been followed by Dr. Juli Finn, presumed metastatic disease. Elevated LFTs - , , alk phos 815. Dr. Bj Roblero consulted. 2. Chronic respiratory failure secondary to lung cancer with underlying COPD - on 4L oxygen at home  3. Pneumonia - Left Lower Lobe - Likely cause of leukocystosis. On Zosyn. 4. Anemia - macrocytic - hgb currently 11.6g.  5. Possible peritonitis or bacteremia - WBCs 18.8, afebrile temp 97.5. Pt. Has new onset ascites in the setting of multiple liver metastaes. He is non tender on exam, while a paracentesis has to be done soon, he most likely does not have peritonitis. 6. Thrombocytosis - platelets currently 31,603.  7. Acute kidney injury - BUN 65, creatinine 3.5.  8. DM2 with CKD3  9. CAD  - s/p CABG, pacemaker. PLAN:   1. Palliative care has been consulted. Pt is a DNR-CCA. 2. Will need a paracentesis, however, he is on Plavix, and Radiology may need this to be held x 5 days in order to do the paracentesis. Would send for cytology, in addition to cell count, Albumin, Culture, Gram Stain. 3. Will wait on any Liver Biopsy - have to weigh the risks vs. Benefits, and if a liver bx would change the management. Also, would need the Plavix to be held for this procedure. ADDENDUM:      I have ordered an AFP test.      INR is within normal range. Will need to contact Radiologist of the day to see if that radiologist will do the paracentesis with Plavix on board. HISTORY OF PRESENT ILLNESS       81yo man who presented to the emergency room earlier today with bilateral feet swelling for past 2 days with abdominal pain and abdominal distention.   He has chronic respiratory failure associated with his lung cancer and is on 4L of oxygen at home. He did not complete therapy for his lung cancer. His imaging in the emergency room showed multiple hypodense liver masses which are concerning for metastasis. He also has gained weight in the past week, normally 239 lbs. And it went up to 259 lbs. Increased abdominal girth. EGD:  No  Colonoscopy: 2005  Family history of GI malignancy: No    No change in bowel habits, melena, bright red blood per rectum, or unexplained weight loss. No dysphagia, odynophagia, pyrosis/sour brush, globus, dyspepsia, n/v, abd bloating, abd pain       PROBLEM LIST    has Bradycardia; Shortness of breath; General weakness; and Coronary artery disease involving native coronary artery of native heart without angina pectoris on his pertinent problem list.    PAST MEDICAL HISTORY     has a past medical history of Acute and chronic respiratory failure with hypoxia ([address] Acute respiratory distress syndrome (ARDS) ([address] Adrenal disease ([address] Adrenal tumor; Atherosclerotic heart disease of native coronary artery with angina pectoris ([address] Atrial fibrillation ([address] Blood circulation, collateral; BPH (benign prostatic hyperplasia); CAD (coronary artery disease); Cardiac pacemaker; Cavernous hemangioma; Cerebrovascular accident [facility]); CHF (congestive heart failure) ([address] Chronic lymphocytic leukemia of B-cell type ([address] Chronic sinusitis; CLL (chronic lymphocytic leukemia) ([address] COPD (chronic obstructive pulmonary disease) ([address] Diabetes mellitus type II, uncontrolled ([address] Dyspnea on exertion; Essential tremor; GERD (gastroesophageal reflux disease); History of blood transfusion; Hyperkalemia; Hyperlipidemia; Hypertension; Idiopathic gout; Incontinence; Kidney stone; Lung cancer ([address] Malignant neoplasm of upper lobe, left bronchus or lung ([address] Muscle weakness; Nephrolithiasis;  Nonspecific abnormal finding of lung field; Obesity; Paroxysmal a-fib ([facility] Pneumonia; Presence of aortocoronary bypass graft; Presence of cardiac and vascular implant and graft; Prostate disease; Shingles; Skin cancer; Squamous cell carcinoma lung, left ([facility] and Tobacco abuse. PAST SURGICAL HISTORY      has a past surgical history that includes Cardiac surgery (1992); back surgery (1995); hernia repair; Appendectomy; Inner ear surgery; other surgical history; liver biopsy; other surgical history (2000); Colonoscopy (11/2005); Cataract removal (Bilateral); and pacemaker placement. HOME MEDICATIONS      Prior to Admission medications    Medication Sig Start Date End Date Taking? Authorizing Provider   clopidogrel (PLAVIX) 75 MG tablet TAKE ONE TABLET BY MOUTH ONCE DAILY 7/17/18  Yes David Frankel MD   sulfacetaminde-prednisoLONE (BLEPHAMIDE) 10-0.2 % ophthalmic ointment 4 times daily 4 times daily.    Yes Historical Provider, MD   acetaminophen (TYLENOL) 325 MG tablet Take 2 tablets by mouth every 6 hours as needed for Pain or Fever 5/25/18  Yes Rj Wiley MD   tamsulosin [facility]) 0.4 MG capsule Take 2 capsules by mouth daily 5/25/18  Yes Rj Wiley MD   insulin glargine (LANTUS SOLOSTAR) 100 UNIT/ML injection pen Inject 12 Units into the skin nightly Additional RF per Dr Deedee Heaton 5/25/18  Yes Rj Wiley MD   methenamine (HIPREX) 1 g tablet Take 1 tablet by mouth 2 times daily 4/23/18  Yes David Frankel MD   amiodarone (CORDARONE) 200 MG tablet Take 1 tablet by mouth daily 4/23/18  Yes David Frankel MD   guaiFENesin (MUCINEX) 600 MG extended release tablet Take 1 tablet by mouth 2 times daily 3/7/18  Yes Cody Kaye MD   budesonide (PULMICORT FLEXHALER) 180 MCG/ACT AEPB inhaler Inhale 2 puffs into the lungs daily   Yes Historical Provider, MD   atorvastatin (LIPITOR) 80 MG tablet TAKE ONE TABLET BY MOUTH ONCE DAILY 12/20/17  Yes David Frankel MD   Insulin Pen Needle (B-D ULTRAFINE III SHORT PEN) 31G X 8 MM MISC Inject insulin History  Social History     Social History    Marital status:      Spouse name: N/A    Number of children: N/A    Years of education: N/A     Social History Main Topics    Smoking status: Former Smoker     Years: 45.00     Quit date: 7/29/1992    Smokeless tobacco: Never Used    Alcohol use No    Drug use: No    Sexual activity: Not Asked     Other Topics Concern    None     Social History Narrative    None       FAMILY HISTORY    family history includes Diabetes in his mother; Heart Disease in his father and mother. REVIEW OF SYSTEMS:    GENERAL:  No fever, chills or weight loss. EYES:  No  blurred vision, double vision, glaucoma, pain on exposure to light. CARDIOVASCULAR:  No chest pain or palpitations. RESPIRATORY:  No dyspnea or cough. GI:  See history. MUSCULOSKELETAL:  No new painful or swollen joints or myalgias. :   No dysuria or hematuria. SKIN:  No rashes or jaundice. NEUROLOGIC:   No headaches or  seizures,  numbness or tingling of arms, or legs. PSYCH:  No anxiety or depression. ENDOCRINE:   No polyuria or polydipsia. BLOOD:  No anemia, bleeding disorder, blood or blood product transfusion. PHYSICAL EXAMINATION:     height is 5' 11\" (1.803 m) and weight is 259 lb 12.8 oz (117.8 kg). His oral temperature is 97.5 °F (36.4 °C). His blood pressure is 128/61 and his pulse is 68. His respiration is 18 and oxygen saturation is 95%. GEN: Well nourished, well developed. LUNGS:  Clear to auscultation bilaterally. Chest rises equally on inspiration. CARDIOVASCULAR:  Regular rate and rhythm without murmurs, rubs or gallops. Pacemaker. ABDOMEN:  Soft, nontender and nondistended with normal bowel sounds. + shifting dullness to percussion. HEAD:  Normal cephalic/atraumatic. Extraoccular motions intact bilaterally. NECK:  No lymphadenopathy or bruits. UPPER EXTREMITIES:  No cyanosis, clubbing, or edema. DERM:  No rash or jaundice.     LOWER

## 2018-07-28 NOTE — ED TRIAGE NOTES
Patient presents with BLE leg swelling. States he noticed it a day or so ago. States he was recently placed on bumex but it doesn't seem to be working per patient. Patient states he is always short of breath due to his CA, CHF, and COPD.  States he is always on 4/min NC at home

## 2018-07-28 NOTE — H&P
lymphocytic leukemia) ([facility] 12/2012    Dr Casandra Cushing COPD (chronic obstructive pulmonary disease) ([facility]     Diabetes mellitus type II, uncontrolled ([facility] 7/30/2012    Dyspnea on exertion     Essential tremor 7/30/2012    [facility], Dr Chucky Ibarra GERD (gastroesophageal reflux disease)     History of blood transfusion     Hyperkalemia     Hyperlipidemia     Hypertension     Idiopathic gout     Incontinence     Kidney stone     [facility]     Malignant neoplasm of upper lobe, left bronchus or lung (HCC)     Muscle weakness     Nephrolithiasis     Nonspecific abnormal finding of lung field     Obesity     Paroxysmal a-fib (HCC)     Pneumonia     Presence of aortocoronary bypass graft     Presence of cardiac and vascular implant and graft     Prostate disease     Shingles     history of    Skin cancer     Dr Devyn Johnson    Squamous cell carcinoma lung, left ([facility] 12/11/2017    Tobacco abuse     history of       Past Surgical History:          Procedure Laterality Date    APPENDECTOMY      BACK SURGERY  1995    discectomy    CARDIAC SURGERY  1992    CABG    CATARACT REMOVAL Bilateral     COLONOSCOPY  11/2005    HERNIA REPAIR      bilateral inguinal    INNER EAR SURGERY      x2 , Dr Kimberly Ashley LIVER BIOPSY      OTHER SURGICAL HISTORY      excision of basal cell carcinoma    OTHER SURGICAL HISTORY  2000    right lower eyelid    PACEMAKER PLACEMENT         Medications Prior to Admission:      Prior to Admission medications    Medication Sig Start Date End Date Taking? Authorizing Provider   clopidogrel (PLAVIX) 75 MG tablet TAKE ONE TABLET BY MOUTH ONCE DAILY 7/17/18  Yes Barbie Carpenter MD   sulfacetaminde-prednisoLONE (BLEPHAMIDE) 10-0.2 % ophthalmic ointment 4 times daily 4 times daily.    Yes Historical Provider, MD   acetaminophen (TYLENOL) 325 MG tablet Take 2 tablets by mouth every 6 hours as needed for Pain or Fever 5/25/18  Yes Lara Ludwig MD   tamsulosin (FLOMAX) 0.4 MG capsule Take 2 capsules by mouth daily 5/25/18  Yes Michael Miller MD   insulin glargine (LANTUS SOLOSTAR) 100 UNIT/ML injection pen Inject 12 Units into the skin nightly Additional RF per Dr Liban Melendrez 5/25/18  Yes Michael Miller MD   methenamine (HIPREX) 1 g tablet Take 1 tablet by mouth 2 times daily 4/23/18  Yes Victorina Liu MD   amiodarone (CORDARONE) 200 MG tablet Take 1 tablet by mouth daily 4/23/18  Yes Victorina Liu MD   guaiFENesin (MUCINEX) 600 MG extended release tablet Take 1 tablet by mouth 2 times daily 3/7/18  Yes Sampson Lacey MD   budesonide (PULMICORT FLEXHALER) 180 MCG/ACT AEPB inhaler Inhale 2 puffs into the lungs daily   Yes Historical Provider, MD   atorvastatin (LIPITOR) 80 MG tablet TAKE ONE TABLET BY MOUTH ONCE DAILY 12/20/17  Yes Victorina Liu MD   Insulin Pen Needle (B-D ULTRAFINE III SHORT PEN) 31G X 8 MM MISC Inject insulin SQ 2 x daily (Dx: E11.65) 12/18/17  Yes SEBASTIAN Robbins CNP   albuterol (PROVENTIL) (2.5 MG/3ML) 0.083% nebulizer solution Take 3 mLs by nebulization every 4 hours as needed for Wheezing 4/18/16  Yes Victorina Liu MD   metoprolol (LOPRESSOR) 100 MG tablet Take 100 mg by mouth 2 times daily. Yes Historical Provider, MD   Coenzyme Q10 (COQ10 PO) Take 10 mg by mouth daily    Yes Historical Provider, MD   aspirin 81 MG tablet Take 81 mg by mouth daily. Yes Historical Provider, MD   Cranberry 500 MG TABS Take 1,000 mg by mouth daily    Yes Historical Provider, MD   fish oil-omega-3 fatty acids 1000 MG capsule Take 1 g by mouth daily    Yes Historical Provider, MD   therapeutic multivitamin-minerals (THERAGRAN-M) tablet Take 1 tablet by mouth daily.      Yes Historical Provider, MD   ONE TOUCH ULTRA TEST strip USE TO CHECK BLOOD SUGAR TWICE DAILY 6/28/18   SEBASTIAN Perkins CNP   Incontinence Supplies (BEDSIDE DRAINAGE BAG) MISC Large 2000 cc bedside drainage bag 6/14/18   SEBASTIAN Muniz CNP   Blood Glucose Monitoring Suppl (ONE TOUCH ULTRA MINI) w/Device KIT 1 kit by Does not apply route daily Dx E11.8 8/8/17   Candido Orantes MD   nitroGLYCERIN (NITROSTAT) 0.4 MG SL tablet Place 1 tablet under the tongue every 5 minutes as needed for Chest pain 5/24/16   Prema Molina MD   Lancets MISC Check 2x/day 7/10/13   Candido Oranets MD       Allergies:  Quinidine    Social History:      The patient currently lives at home    TOBACCO:   reports that he quit smoking about 26 years ago. He quit after 45.00 years of use. He has never used smokeless tobacco.  ETOH:   reports that he does not drink alcohol. Family History:      Reviewed in detail and negative for Cancer, CVA. Positive as follows:    Family History   Problem Relation Age of Onset    Heart Disease Mother     Diabetes Mother     Heart Disease Father        Diet:  DIET GENERAL; No Added Salt (3-4 GM)    REVIEW OF SYSTEMS:   Pertinent positives as noted in the HPI. All other systems reviewed and negative. PHYSICAL EXAM:    /61   Pulse 68   Temp 97.5 °F (36.4 °C) (Oral)   Resp 18   Ht 5' 11\" (1.803 m)   Wt 259 lb 12.8 oz (117.8 kg)   SpO2 95%   BMI 36.23 kg/m²     General appearance:  No apparent distress, appears stated age and cooperative. HEENT:  Normal cephalic, atraumatic without obvious deformity. Pupils equal, round, and reactive to light. Extra ocular muscles intact. Conjunctivae/corneas clear. Neck: Supple, with full range of motion. No jugular venous distention. Trachea midline. Respiratory:  Normal respiratory effort. Clear to auscultation, bilaterally without Rales/Wheezes/Rhonchi. Cardiovascular:  Regular rate and rhythm with normal S1/S2 without murmurs, rubs or gallops. Abdomen: Soft, non-tender, non-distended with normal bowel sounds. Musculoskeletal:  No clubbing, cyanosis or edema bilaterally. Full range of motion without deformity. Skin: dry and warm. Neurologic:  Neurovascularly intact without any focal sensory/motor deficits.  Cranial nerves: II-XII intact, grossly non-focal.  Psychiatric:  Alert and oriented, thought content appropriate, normal insight  Capillary Refill: Brisk,< 3 seconds   Peripheral Pulses: +2 palpable, equal bilaterally       Labs:     Recent Labs      07/27/18   1430  07/28/18   0151   WBC  15.2*  18.8*   HGB  12.2*  11.6*   HCT  39.5*  37.1*   PLT  106*  96*     Recent Labs      07/27/18   1430  07/28/18   0150   NA  140  140   K  4.9  4.7   CL  97*  99   CO2  27  28   BUN  62*  65*   CREATININE  3.6*  3.5*   CALCIUM  8.8  8.7     Recent Labs      07/28/18   0150   AST  137*   ALT  127*   BILIDIR  1.2*   BILITOT  1.6*   ALKPHOS  815*     No results for input(s): INR in the last 72 hours. No results for input(s): Susan Nap in the last 72 hours. Urinalysis:      Lab Results   Component Value Date    NITRU NEGATIVE 07/12/2018    WBCUA 25-50 07/12/2018    WBCUA 0-5 01/02/2015    BACTERIA MODERATE 07/12/2018    RBCUA 10-15 07/12/2018    RBCUA 0-2 05/19/2018    BLOODU LARGE 07/12/2018    SPECGRAV 1.020 07/12/2018    GLUCOSEU NEGATIVE 05/19/2018       Intake & Output:  No intake/output data recorded. No intake/output data recorded. Radiology:     EKG:  I have reviewed the EKG with the following interpretation: Paced    CT ABDOMEN PELVIS WO CONTRAST Additional Contrast? None   Final Result   Interval development of multiple liver masses consistent with metastatic disease. Cirrhotic liver. Moderate ascites. Small to moderate left pleural effusion with left lower lobe atelectasis. Gallstones without evidence of biliary dilatation. No acute inflammatory or infectious process in the abdomen or pelvis. No evidence of bowel obstruction. Multiple renal calculi. No hydroureteronephrosis. Colonic diverticulosis without diverticulitis. **This report has been created using voice recognition software. It may contain minor errors which are inherent in voice recognition technology. **      Final report electronically signed by Dr. Arabella Glez on 7/28/2018 3:04 AM      XR CHEST PORTABLE   Final Result      New left lower lobe pneumonia. Mild right basilar atelectasis. Accentuation of the previously noted left upper lobe scar or mass probably due to more lordotic technique. If further evaluation is warranted recommend PA and lateral views of the chest.            **This report has been created using voice recognition software. It may contain minor errors which are inherent in voice recognition technology. **      Final report electronically signed by Dr. Arabella Glez on 7/28/2018 1:57 AM               DVT prophylaxis: [] Lovenox                                 [x] SCDs                                 [] SQ Heparin                                 [] Encourage ambulation           [] Already on Anticoagulation    Code Status: DNR-CCA      Disposition:    [] Home       [] TCU       [x] Rehab       [] Psych       [] SNF       [] [place]       [] Other-    ASSESSMENT:    Active Hospital Problems    Diagnosis Date Noted    Liver mass [R16.0] 07/28/2018     Priority: High    Coronary artery disease involving native coronary artery of native heart without angina pectoris [I25.10]      Priority: High    DARIAN (acute kidney injury) ([address] [N17.9]     CKD stage 3 due to type 2 diabetes mellitus ([address] [W58.92, N18.3] 05/06/2018    Chronic respiratory failure with hypoxia ([address] [J96.11] 03/01/2018    GERD (gastroesophageal reflux disease) [K21.9] 10/07/2016    Hyperlipidemia [E78.5]     BPH (benign prostatic hyperplasia) [N40.0] 07/30/2012    Essential tremor [G25.0] 07/30/2012    Type 2 diabetes mellitus with stage 3 chronic kidney disease, with long-term current use of insulin ([facility] [E11.22, N18.3, Z79.4] 07/30/2012       PLAN:    1. Acute kidney injury - Start IVF. Monitor for fluid status  2. Liver mass - may be the underlying cause of LE swelling.   Patient has transaminitis probably due to cancer. Called GI and Oncology for further evaluation and management. 3. Possible peritonitis or bacteremia - follow up with culture report. Empirically starting Zosyn for now. 4. HTN and HLD   5. DM2 with CKD3 - insulin sliding scale. 6. Poor prognosis - explained probable poor prognosis to patient and son. Patient's code status is DNR-CCA. Thank you Edwin Saleh MD for the opportunity to be involved in this patient's care.     Electronically signed by Maggie Khanna DO on 7/28/2018 at 6:30 AM

## 2018-07-28 NOTE — ED PROVIDER NOTES
volume, difficulty urinating, dysuria and hematuria. Musculoskeletal: Negative for arthralgias, back pain, joint swelling and neck pain. Skin: Negative for pallor and rash. Ecchymosis bilateral feet   Allergic/Immunologic: Negative for environmental allergies. Neurological: Positive for weakness. Negative for dizziness, syncope, light-headedness, numbness and headaches. Hematological: Negative for adenopathy. Psychiatric/Behavioral: Negative for confusion and suicidal ideas. The patient is not nervous/anxious. PAST MEDICAL HISTORY    has a past medical history of Acute and chronic respiratory failure with hypoxia ([address] Acute respiratory distress syndrome (ARDS) ([address] Adrenal disease ([address] Adrenal tumor; Atherosclerotic heart disease of native coronary artery with angina pectoris ([address] Atrial fibrillation ([address] BPH (benign prostatic hyperplasia); CAD (coronary artery disease); Cardiac pacemaker; Cavernous hemangioma; Cerebrovascular accident [facility]); CHF (congestive heart failure) ([address] Chronic lymphocytic leukemia of B-cell type ([address] Chronic sinusitis; CLL (chronic lymphocytic leukemia) ([address] COPD (chronic obstructive pulmonary disease) ([address] Diabetes mellitus type II, uncontrolled ([address] Dyspnea on exertion; Essential tremor; GERD (gastroesophageal reflux disease); Hyperkalemia; Hyperlipidemia; Hypertension; Idiopathic gout; Incontinence; Kidney stone; Lung cancer ([address] Malignant neoplasm of upper lobe, left bronchus or lung ([address] Muscle weakness; Nephrolithiasis; Nonspecific abnormal finding of lung field; Obesity; Paroxysmal a-fib ([address] Pneumonia; Presence of aortocoronary bypass graft; Presence of cardiac and vascular implant and graft; Prostate disease; Shingles; Skin cancer; Squamous cell carcinoma lung, left ([address] and Tobacco abuse. SURGICAL HISTORY      has a past surgical history that includes Cardiac surgery (1992); back surgery (1995); hernia repair;  Appendectomy; Inner ear surgery; other surgical history; liver biopsy; other surgical history (2000); Colonoscopy (11/2005); Cataract removal (Bilateral); and pacemaker placement. CURRENT MEDICATIONS       Previous Medications    ACETAMINOPHEN (TYLENOL) 325 MG TABLET    Take 2 tablets by mouth every 6 hours as needed for Pain or Fever    ALBUTEROL (PROVENTIL) (2.5 MG/3ML) 0.083% NEBULIZER SOLUTION    Take 3 mLs by nebulization every 4 hours as needed for Wheezing    AMIODARONE (CORDARONE) 200 MG TABLET    Take 1 tablet by mouth daily    ASPIRIN 81 MG TABLET    Take 81 mg by mouth daily. ATORVASTATIN (LIPITOR) 80 MG TABLET    TAKE ONE TABLET BY MOUTH ONCE DAILY    BLOOD GLUCOSE MONITORING SUPPL (ONE TOUCH ULTRA MINI) W/DEVICE KIT    1 kit by Does not apply route daily Dx E11.8    BUDESONIDE (PULMICORT FLEXHALER) 180 MCG/ACT AEPB INHALER    Inhale 2 puffs into the lungs daily    CLOPIDOGREL (PLAVIX) 75 MG TABLET    TAKE ONE TABLET BY MOUTH ONCE DAILY    COENZYME Q10 (COQ10 PO)    Take 10 mg by mouth daily     CRANBERRY 500 MG TABS    Take 1,000 mg by mouth daily     FISH OIL-OMEGA-3 FATTY ACIDS 1000 MG CAPSULE    Take 1 g by mouth daily     GUAIFENESIN (MUCINEX) 600 MG EXTENDED RELEASE TABLET    Take 1 tablet by mouth 2 times daily    INCONTINENCE SUPPLIES (BEDSIDE DRAINAGE BAG) MISC    Large 2000 cc bedside drainage bag    INSULIN GLARGINE (LANTUS SOLOSTAR) 100 UNIT/ML INJECTION PEN    Inject 12 Units into the skin nightly Additional RF per Dr Joaquin Ellsworth (B-D ULTRAFINE III SHORT PEN) 31G X 8 MM MISC    Inject insulin SQ 2 x daily (Dx: E11.65)    LANCETS MISC    Check 2x/day    METHENAMINE (HIPREX) 1 G TABLET    Take 1 tablet by mouth 2 times daily    METOPROLOL (LOPRESSOR) 100 MG TABLET    Take 100 mg by mouth 2 times daily.     NITROGLYCERIN (NITROSTAT) 0.4 MG SL TABLET    Place 1 tablet under the tongue every 5 minutes as needed for Chest pain    ONE TOUCH ULTRA TEST STRIP    USE TO CHECK BLOOD SUGAR TWICE DAILY    SULFACETAMINDE-PREDNISOLONE (BLEPHAMIDE) 10-0.2 % OPHTHALMIC OINTMENT    4 times daily 4 times daily. TAMSULOSIN (FLOMAX) 0.4 MG CAPSULE    Take 2 capsules by mouth daily    THERAPEUTIC MULTIVITAMIN-MINERALS (THERAGRAN-M) TABLET    Take 1 tablet by mouth daily. ALLERGIES     is allergic to quinidine. FAMILY HISTORY     indicated that his mother is . He indicated that his father is . family history includes Diabetes in his mother; Heart Disease in his father and mother. SOCIAL HISTORY      reports that he quit smoking about 26 years ago. He quit after 45.00 years of use. He has never used smokeless tobacco. He reports that he does not drink alcohol or use drugs. PHYSICAL EXAM     INITIAL VITALS:  oral temperature is 97.5 °F (36.4 °C). His blood pressure is 102/91 (abnormal) and his pulse is 60. His respiration is 20 and oxygen saturation is 97%. Physical Exam   Constitutional: He is oriented to person, place, and time. He appears well-developed and well-nourished. Nasal cannula in place. HENT:   Head: Normocephalic and atraumatic. Right Ear: External ear normal.   Left Ear: External ear normal.   Eyes: Conjunctivae are normal. Right eye exhibits no discharge. Left eye exhibits no discharge. No scleral icterus. Neck: Normal range of motion. Neck supple. No JVD present. Pulmonary/Chest: Effort normal. No stridor. No respiratory distress. He has decreased breath sounds. He has wheezes (expiratory) in the right lower field. Abdominal: Soft. Bowel sounds are normal. He exhibits distension. There is no tenderness. Abdominal edema noted. Musculoskeletal: Normal range of motion. Right lower leg: He exhibits swelling and edema (3+ pitting). Left lower leg: He exhibits swelling and edema (3+ pitting). Right foot: There is swelling. Left foot: There is swelling. Neurological: He is alert and oriented to person, place, and time. personally performed the services described in the documentation, reviewed and edited the documentation which was dictated to the scribe in my presence, and it accurately records my words and actions.     SEBASTIAN Gutierrez 7/28/18 4:47 AM        SEBASTIAN Marcelino - CNP  07/28/18 1947

## 2018-07-29 NOTE — PROGRESS NOTES
Moderate ascites. Small to moderate left pleural effusion with left lower lobe atelectasis. Gallstones without evidence of biliary dilatation. No acute inflammatory or infectious process in the abdomen or pelvis. No evidence of bowel obstruction. Multiple renal calculi. No hydroureteronephrosis. Colonic diverticulosis without diverticulitis. **This report has been created using voice recognition software. It may contain minor errors which are inherent in voice recognition technology. **      Final report electronically signed by Dr. Roxi Celestin on 7/28/2018 3:04 AM      XR CHEST PORTABLE   Final Result      New left lower lobe pneumonia. Mild right basilar atelectasis. Accentuation of the previously noted left upper lobe scar or mass probably due to more lordotic technique. If further evaluation is warranted recommend PA and lateral views of the chest.            **This report has been created using voice recognition software. It may contain minor errors which are inherent in voice recognition technology. **      Final report electronically signed by Dr. Roxi Celestin on 7/28/2018 1:57 AM      [address]    (Results Pending)       Diet: DIET GENERAL; No Added Salt (3-4 GM)    DVT prophylaxis: [x] Lovenox                                 [] SCDs                                 [x] SQ Heparin                                 [] Encourage ambulation           [] Already on Anticoagulation     Disposition:    [] Home       [] TCU       [] Rehab       [] Psych       [] SNF       [] [place]       [] Other-    Code Status: DNR-CCA    PT/OT Eval Status: y  Assessment/Plan:    Anticipated Discharge in : 2-4 days    C/Nilson Avery [address] Problems    Diagnosis Date Noted    Coronary artery disease involving native coronary artery of native heart without angina pectoris [I25.10]      Priority: High    Liver mass [R16.0] 07/28/2018    DARIAN (acute kidney injury) ([facility] [N17.9]     CKD stage 3 due to type 2 diabetes mellitus ([facility] [D99.81, N18.3] 05/06/2018    Chronic respiratory failure with hypoxia (HCC) [J96.11] 03/01/2018    GERD (gastroesophageal reflux disease) [K21.9] 10/07/2016    Hyperlipidemia [E78.5]     BPH (benign prostatic hyperplasia) [N40.0] 07/30/2012    Essential tremor [G25.0] 07/30/2012    Type 2 diabetes mellitus with stage 3 chronic kidney disease, with long-term current use of insulin ([facility] [E11.22, N18.3, Z79.4] 07/30/2012       1. Liver mass: presumed Meds. GI and oncology following  2. Ascites: Planned paracentesis. Consider albumin  3. Abd pain: pain control  4. DM: Glucose control  5. DARIAN: Gentle hydration. Consult nephrology   6.  Continue all other mgt        Electronically signed by Steven Chopra MD on 7/29/2018 at 9:39 AM

## 2018-07-29 NOTE — CONSULTS
[facility]    [facility]  [address]  [place], [address]  [address] [place] [facility] Consult      7/29/2018 12:50 PM         Pt Name:    Taty Velez  MRN:     [id number]   [id number]  YOB: 1928  Admit Date:    7/28/2018 12:30 AM  Primary Care Physician:  Jodie Townsend MD   Room Number:  3B-22/022-A   Reason for Consult:  Decreased eGFR  Requesting Providor: Dr. Blima Goodell  Primary Nephrologist: Dr. Yeni Connell    ### ISOLATION ###:   none     Admit Chief Complaint: weakness and abnormal labs and leg swelling. History of Chief Complaint:   The patient is a 80y.o. year old white male who was seen by Dr. Yeni Connell in [facility] for DARIAN. He has a history of DM, HTN, Hematuria and kidney stones. His kidney function improved and he was discharged. He was found to have leg swelling. Labs were drawn and he was found to have acute kidney injury again. His appetite has been poor. He has a chronic indwelling rhodes catheter. He has not been taking NSAIDS, ACEI, ARB. He has known metastatic disease and has declined treatment. He has  New lesions in the liver. Nephrology is following the patient for: acute kidney injury     24 hour summary:   The patient was relaxing in bed. He feels about the same. Food does not taste good to him and for that reason he has not been eating nor drinking well. Baseline eGFR 55-65 ml/min   Admit eGFR 15 ml/min   Current eGFR 15 ml/min       eGFR trend    Lab Results   Component Value Date    LABGLOM 15 07/29/2018    LABGLOM 17 07/28/2018    LABGLOM 16 07/27/2018    LABGLOM 48 05/29/2018    LABGLOM 70 05/20/2018    LABGLOM 70 05/14/2018    LABGLOM 57 05/12/2018    LABGLOM 44 05/09/2018    LABGLOM 41 05/08/2018    LABGLOM 38 05/07/2018    LABGLOM 33 05/06/2018    LABGLOM 57 03/07/2018          Compliance with treatment plan: 100%     Allergies and Intolerances:   ALLERGIES: Quinidine  MEDICATION INTOLERANCES: none  FOOD ALLERGIES: none  INSECT ALLERGIES: none  PLANT ALLERGIES: none     Past Medical History:  Past Medical History:   Diagnosis Date    Acute and chronic respiratory failure with hypoxia (HCC)     Acute respiratory distress syndrome (ARDS) (HCC)     Adrenal disease (HCC)     Dr Moody Bashir Adrenal tumor 9/24/2012    Atherosclerotic heart disease of native coronary artery with angina pectoris ([address]     Atrial fibrillation ([address]     Blood circulation, collateral     BPH (benign prostatic hyperplasia) 7/30/2012    CAD (coronary artery disease)     Dr Meet Roebrts Cardiac pacemaker     Cavernous hemangioma     of liver, history of    Cerebrovascular accident ([address]     CHF (congestive heart failure) ([address] 7/30/2012    Dr Higgins Husbands    Chronic lymphocytic leukemia of B-cell type ([address]     Chronic sinusitis     CLL (chronic lymphocytic leukemia) ([address] 12/2012    Dr Sheree Andrews COPD (chronic obstructive pulmonary disease) ([address]     Diabetes mellitus type II, uncontrolled ([address] 7/30/2012    Dyspnea on exertion     Essential tremor 7/30/2012    [facility], [place] Carissa Archer GERD (gastroesophageal reflux disease)     History of blood transfusion     Hyperkalemia     Hyperlipidemia     Hypertension     Idiopathic gout     Incontinence     Kidney stone     Lung cancer ([address]     Malignant neoplasm of upper lobe, left bronchus or lung (HCC)     Muscle weakness     Nephrolithiasis     Nonspecific abnormal finding of lung field     Obesity     Paroxysmal a-fib (HCC)     Pneumonia     Presence of aortocoronary bypass graft     Presence of cardiac and vascular implant and graft     Prostate disease     Shingles     history of    Skin cancer     Dr Edgar Lyn    Squamous cell carcinoma lung, left ([address] 12/11/2017    Tobacco abuse     history of        Past Surgical History:  Past Surgical History:   Procedure Laterality Date    APPENDECTOMY      BACK SURGERY  1995    discectomy    CARDIAC SURGERY  1992    CABG    CATARACT REMOVAL Bilateral Take 1 tablet by mouth 2 times daily 3/7/18  Yes Komal Senior MD   budesonide (PULMICORT FLEXHALER) 180 MCG/ACT AEPB inhaler Inhale 2 puffs into the lungs daily   Yes Historical Provider, MD   atorvastatin (LIPITOR) 80 MG tablet TAKE ONE TABLET BY MOUTH ONCE DAILY 12/20/17  Yes Beltran Franklin MD   Insulin Pen Needle (B-D ULTRAFINE III SHORT PEN) 31G X 8 MM MISC Inject insulin SQ 2 x daily (Dx: E11.65) 12/18/17  Yes SEBASTIAN Robbins CNP   albuterol (PROVENTIL) (2.5 MG/3ML) 0.083% nebulizer solution Take 3 mLs by nebulization every 4 hours as needed for Wheezing 4/18/16  Yes Beltran Franklin MD   metoprolol (LOPRESSOR) 100 MG tablet Take 100 mg by mouth 2 times daily. Yes Historical Provider, MD   Coenzyme Q10 (COQ10 PO) Take 10 mg by mouth daily    Yes Historical Provider, MD   aspirin 81 MG tablet Take 81 mg by mouth daily. Yes Historical Provider, MD   Cranberry 500 MG TABS Take 1,000 mg by mouth daily    Yes Historical Provider, MD   fish oil-omega-3 fatty acids 1000 MG capsule Take 1 g by mouth daily    Yes Historical Provider, MD   therapeutic multivitamin-minerals (THERAGRAN-M) tablet Take 1 tablet by mouth daily.      Yes Historical Provider, MD   ONE TOUCH ULTRA TEST strip USE TO CHECK BLOOD SUGAR TWICE DAILY 6/28/18   SEBASTIAN Lugo CNP   Incontinence Supplies (BEDSIDE DRAINAGE BAG) [facility] Large 2000 cc bedside drainage bag 6/14/18   SEBASTIAN Bryant CNP   Blood Glucose Monitoring Suppl (ONE TOUCH ULTRA MINI) w/Device KIT 1 kit by Does not apply route daily Dx E11.8 8/8/17   Agustina Noriega MD   nitroGLYCERIN (NITROSTAT) 0.4 MG SL tablet Place 1 tablet under the tongue every 5 minutes as needed for Chest pain 5/24/16   Beltran Franklin MD   Lancets MISC Check 2x/day 7/10/13   Agustina Noriega MD        Lab data:  CBC:    Recent Labs      07/27/18   1430  07/28/18   0151  07/29/18   0540   WBC  15.2*  18.8*  11.2*   HGB  12.2*  11.6*  11.3*   PLT  106*  96*  77*     CMP: none.    NEUROLOGICAL  Dizziness: none, Tingling: none, Numbness: none, Tremor: none, Sensory change: none,   Speech change: none, Focal weakness: none, Seizures: none, Loss of consciousness: none,    PSYCHIATRIC  Depression: none, Suicidal ideation: none, Heroin abuse: none, Cocaine abuse: none,   Marijuana abuse: none, Hallucinations: none, Anxiety: none, Memory loss: none       Physical Examination:  BP (!) 104/52   Pulse 62   Temp 97.6 °F (36.4 °C) (Axillary)   Resp 24   Ht 5' 11\" (1.803 m)   Wt 259 lb 12.8 oz (117.8 kg)   SpO2 97%   BMI 36.23 kg/m²       General appearance: alert and cooperative with exam  HEENT: Head: Normocephalic, no lesions, without obvious abnormality. Neck: no adenopathy, no carotid bruit and no JVD  Lungs: clear to auscultation bilaterally  Heart: regular rate and rhythm, S1, S2 normal, no murmur, click, rub or gallop  Abdomen: distended with fluid wave. Extremities: 2-3 + pitting edema. Neurologic: Mental status: Alert, oriented, thought content appropriate  PSY: No evidence of depression. Mood is normal for the patient. Skin: Warm and dry. No unusual lesions or rashes noted. Muscles: Hand  is equal and strong bilaterally. Leg strength is equal and strong. Skeletal: No bony or skeletal abnormalities noted. Admission weight: 259 lb 12.8 oz (117.8 kg)  Wt Readings from Last 3 Encounters:   07/28/18 259 lb 12.8 oz (117.8 kg)   06/14/18 238 lb (108 kg)   05/29/18 235 lb 3.2 oz (106.7 kg)     Body mass index is 36.23 kg/m². Clinical Impressions:    1. Acute kidney injury from decreased vascular volume. 2. Abdominal ascites   3. New metastatic lesions in the liver. 4. CKD  5. DM  6. HTN  7. Kidney stone. 8. malnourished     Plan of Care    1. Avoid all: NSAIDS, ACEI, ARB  2. Urine electrolytes  3. IV albumin  4. IV hydration. Anjelica Yeung DO  [facility].

## 2018-07-29 NOTE — PROGRESS NOTES
Formulation and discussion of sedation / procedure plans, risks, benefits, side effects and alternatives with patient and/or responsible adult completed.     Electronically signed by Elmer Sethi MD on 7/29/2018 at 2:32 PM

## 2018-07-29 NOTE — PROGRESS NOTES
Gastroenterology Progress Note:   Patient: Luz Murrieta  : 1928  Acct#: [de-identified]    Patient Name:  Luz Murrieta , 80 y.o. , male with liver masses, new onset    ASSESSMENT     1. Liver masses,  newly visualized on CT scan with underlying lung cancer had been followed by Dr. Juani Tsai, presumed metastatic disease. Elevated LFTs - , , alk phos 691. Dr. Paco Stratton consulted Oncology, but, has not seen yet. 2. History of Lung Cancer - s/p Radiation, but, could not complete. 3. Chronic respiratory failure- secondary to lung cancer with underlying COPD - on 4L oxygen at home- he is a prior smoker. 4. Pneumonia - Left Lower Lobe - Likely cause of leukocystosis. On Zosyn. 5. Anemia - macrocytic - hgb currently 11.3g.  6. Ascites - WBCs 11.2, afebrile temp 97.5. Pt. Has new onset ascites in the setting of multiple liver metastaes. He is non tender on exam, while a paracentesis has to be done soon, he most likely does not have spontaneous bacterial peritonitis. 7. Thrombocytosis - platelets currently 24,500.  8. Acute kidney injury - BUN 69, creatinine 3.9.  9. DM2 with CKD3  10. CAD  - s/p CABG, pacemaker. 11. Chronic Anticoagulation- Plavix. 12. Positive blood culture- gram positive cocci in clusters  13. Chronic Kidney disease type 3. Principal Problem:    Liver mass  Active Problems:    Coronary artery disease involving native coronary artery of native heart without angina pectoris    Type 2 diabetes mellitus with stage 3 chronic kidney disease, with long-term current use of insulin (HCC)    BPH (benign prostatic hyperplasia)    Essential tremor    Hyperlipidemia    GERD (gastroesophageal reflux disease)    Chronic respiratory failure with hypoxia (HCC)    CKD stage 3 due to type 2 diabetes mellitus ([address]    DARIAN (acute kidney injury) ([address]  Resolved Problems:    * No resolved hospital problems.  *     Patient Active Problem List   Diagnosis    CHF (congestive heart [employer]  5. Will wait on any Liver Biopsy - have to weigh the risks vs. Benefits, and if a liver bx would change the management, find out from Dr. Candelaria Soto. SUBJECTIVE      Patient seen and examined. 24 hours events and chart reviewed. (  x )Medications Reviewed ;(   )Old records reviewed    Day 1 of stay. Feeling: ( []  )Better  ([]   ) Worse      ([x]   )Same     Tolerating diet, General , no salt. PHYSICAL EXAMINATION:    In: 966.5   Out: 600 [Urine:600]  I/O last 3 completed shifts: In: 1924.4 [P.O.:540; I.V.:1384.4]  Out: 600 [Urine:600]   DIET GENERAL; No Added Salt (3-4 GM)    Vital Signs  BP (!) 111/53   Pulse 60   Temp 97.5 °F (36.4 °C) (Oral)   Resp 20   Ht 5' 11\" (1.803 m)   Wt 259 lb 12.8 oz (117.8 kg)   SpO2 94%   BMI 36.23 kg/m²     General:   ([x]   )Comfortable      ([]   )Obese          (  [] )chronically sick looking      other:    HEENT: ( [x]  )NoPalour(  [x] )No Icterus (   )ET tube in place                      Mucosa: ( x  )moist(   )dry : Other:    CV: ( [x]  )RRR, (x) Pacemaker , (   [])Irregular/arrhythmias : Murmur: ([x]  ) No   ([]  ) Yes:    Resp: ([x]  )CTA Bilaterally,[x] No added sounds ( []  )Rhonci([]   )Wheeze ([]   )Rales    Abd: ([x]   )Soft([x]   )Non tender (  [] )No palpable masses  (x) shifting dullness to percussion    Ext: ([x]   )No edema ( []  )Edema ( [x]  )No cyanosis    Skin: ( [x]  )Warm  ( []  )Cold   (   )Dry   ( x  )No rash    Neuro:    ( [x]  )Oriented X 3      ([]  )Confused      ( X  )  Other:  Moves all 4 extremities. REVIEW OF DIAGNOSTIC TESTING AND LABS:      Significant Diagnostic Studies:     RADIOLOGY:      7/28/18 CT ABDOMEN PELVIS WO CONTRAST  Interval development of multiple liver masses consistent with metastatic disease. Cirrhotic liver. Moderate ascites. Small to moderate left pleural effusion with left lower lobe atelectasis. Gallstones without evidence of biliary dilatation.  No acute inflammatory or infectious  insulin glargine  12 Units Subcutaneous Nightly    metoprolol  100 mg Oral BID    sulfacetaminde-prednisoLONE   Both Eyes 4x Daily    tamsulosin  0.8 mg Oral Daily    insulin lispro  0-12 Units Subcutaneous TID WC    insulin lispro  0-6 Units Subcutaneous Nightly    heparin (porcine)  5,000 Units Subcutaneous BID    beclomethasone  2 puff Inhalation Daily     Continuous Infusions:   sodium chloride 100 mL/hr at 07/28/18 0629    dextrose       PRN Meds:.sodium chloride flush, magnesium hydroxide, potassium chloride **OR** potassium chloride **OR** potassium chloride, magnesium sulfate, ondansetron, acetaminophen, albuterol, nitroGLYCERIN, glucose, dextrose, glucagon (rDNA), dextrose, tetrahydrozoline    Plan of care discussed with pt and brother at the bedside.       Prepared by Jose Alberto Ramachandran RN  Patient seen and examined by Néstor Schuster MD   Note reviewed, updated, and signed by Néstor Schuster MD  7/29/2018   10:08 AM

## 2018-07-29 NOTE — PLAN OF CARE
Problem: Falls - Risk of:  Goal: Will remain free from falls  Will remain free from falls   Outcome: Met This Shift  Patient is currently free of falls, call light within reach. Goal: Absence of physical injury  Absence of physical injury   Outcome: Met This Shift  Patients free of physical injury. Problem: Risk for Impaired Skin Integrity  Goal: Tissue integrity - skin and mucous membranes  Structural intactness and normal physiological function of skin and  mucous membranes. Outcome: Met This Shift  Close monitoring of patients skin. Problem: Impaired respiratory status  Goal: Clear lung sounds  Outcome: Met This Shift      Comments: Care plan reviewed with patient. Patient verbalizes understanding of the care plan and contributed to goal setting.

## 2018-07-29 NOTE — PROGRESS NOTES
0037- Patients nose is bleeding  0803- Dr. Patricia Yoon in to assess patient, orders to hold plavix, aspirin, and heparin dose this am in anticipation for paracentesis.

## 2018-07-29 NOTE — PLAN OF CARE
Problem: Falls - Risk of:  Goal: Will remain free from falls  Will remain free from falls   Outcome: Ongoing  Assessment & interventions provided throughout shift. Bed locked & in low position, call light in reach, side-rails up x2, non-slip socks on when ambulating, reminded patient to use call light to call for assistance. Goal: Absence of physical injury  Absence of physical injury   Outcome: Ongoing      Problem: Risk for Impaired Skin Integrity  Goal: Tissue integrity - skin and mucous membranes  Structural intactness and normal physiological function of skin and  mucous membranes. Outcome: Ongoing  Ongoing assessment & interventions provided throughout shift. Skin assessments provided. Encouraging/assisting patient to turn as needed. Comments: Care plan reviewed with patient. Patient verbalizes understanding of the care plan and contributed to goal setting.

## 2018-07-30 PROBLEM — E44.0 MODERATE MALNUTRITION (HCC): Status: ACTIVE | Noted: 2018-01-01

## 2018-07-30 NOTE — PLAN OF CARE
Problem: Falls - Risk of:  Goal: Will remain free from falls  Will remain free from falls   Outcome: Ongoing  Assessment & interventions provided throughout shift. Bed locked & in low position, call light in reach, side-rails up x2, non-slip socks on when ambulating, reminded patient to use call light to call for assistance. Problem: Risk for Impaired Skin Integrity  Goal: Tissue integrity - skin and mucous membranes  Structural intactness and normal physiological function of skin and  mucous membranes. Outcome: Ongoing  Ongoing assessment & interventions provided throughout shift. Skin assessments provided. Encouraging/assisting patient to turn as needed. Problem: Impaired respiratory status  Goal: Clear lung sounds  Outcome: Ongoing  Lung sounds, pulse ox, and breathing monitored throughout shift. Discussed correct technique and importance of deep breathing & coughing exercises with patient. Patient able to demonstrate cough & deep breathing exercises to nurse. Problem: Nutrition  Goal: Optimal nutrition therapy  Outcome: Ongoing  Patient on low sodium diet    Problem: Discharge Planning:  Goal: Participates in care planning  Participates in care planning  Outcome: Ongoing  Patient decided to consult Hospice    Problem: Cardiac Output - Decreased:  Intervention: Monitor hemodynamic parameters  Ongoing assessment & interventions provided throughout shift. Patient on continuous telemetry monitoring, heart tones, vitals & pulses checked at least 3 times per shift & PRN. Vitals within acceptable limits. Peripheral pulses palpable. Problem: Serum Glucose Level - Abnormal:  Goal: Ability to maintain appropriate glucose levels will improve to within specified parameters  Ability to maintain appropriate glucose levels will improve to within specified parameters  Outcome: Ongoing  Glucose checked & covered per physician's orders. Comments: Care plan reviewed with patient.   Patient

## 2018-07-30 NOTE — PROGRESS NOTES
Updated on pt status and plan of care by Patti Mcnally CM. She has spoken with pt/family and pt is requesting hospice of [place]. She will make referral call and update Palliative Care if any further needs arise.

## 2018-07-30 NOTE — PROGRESS NOTES
Supplement    DVT prophylaxis: [x] Lovenox                                 [] SCDs                                 [x] SQ Heparin                                 [] Encourage ambulation           [] Already on Anticoagulation     Disposition:    [] Home       [] TCU       [] Rehab       [] Psych       [] SNF       [] [place]       [] Other-    Code Status: DNR-CCA    PT/OT Eval Status: y  Assessment/Plan:    Anticipated Discharge in : 2-4 days    Active Hospital Problems    Diagnosis Date Noted    Coronary artery disease involving native coronary artery of native heart without angina pectoris [I25.10]      Priority: High    Moderate malnutrition ([facility] [E44.0] 07/30/2018    Bilateral lower extremity edema [R60.0]     Liver metastases (HCC) [C78.7]     Malignant ascites [R18.0]     Pneumonia due to organism [J18.9]     Liver mass [R16.0] 07/28/2018    DARIAN (acute kidney injury) ([facility] [N17.9]     CKD stage 3 due to type 2 diabetes mellitus ([facility] [L79.11, N18.3] 05/06/2018    Chronic respiratory failure with hypoxia ([facility] [J96.11] 03/01/2018    GERD (gastroesophageal reflux disease) [K21.9] 10/07/2016    Hyperlipidemia [E78.5]     BPH (benign prostatic hyperplasia) [N40.0] 07/30/2012    Essential tremor [G25.0] 07/30/2012    Type 2 diabetes mellitus with stage 3 chronic kidney disease, with long-term current use of insulin ([facility] [E11.22, N18.3, Z79.4] 07/30/2012   Acute on chronic CHF    1. Liver mass: presumed Meds. GI and oncology following  2. Ascites: 2.7 L drained, monitor  3. Abd pain: pain control  4. DM: Glucose control  5. DARIAN: Gentle hydration. worsening. Albumin and IVF per nephrology   6.  Continue all other mgt        Electronically signed by Mimi Del Valle MD on 7/30/2018 at 1:09 PM

## 2018-07-30 NOTE — CARE COORDINATION
7/30/18, 11:52 AM  Hung Cowan       Admitted from: ED 7/28/2018/ [facility] day: 2   Location: [facility]22/022-A Reason for admit: Liver mass [R16.0] Status: inpt  Admit order signed?: yes  PMH:  has a past medical history of Acute and chronic respiratory failure with hypoxia ([address] Acute respiratory distress syndrome (ARDS) ([address] Adrenal disease ([address] Adrenal tumor; Atherosclerotic heart disease of native coronary artery with angina pectoris ([address] Atrial fibrillation ([address] Blood circulation, collateral; BPH (benign prostatic hyperplasia); CAD (coronary artery disease); Cardiac pacemaker; Cavernous hemangioma; Cerebrovascular accident [facility]); CHF (congestive heart failure) ([address] Chronic lymphocytic leukemia of B-cell type ([address] Chronic sinusitis; CLL (chronic lymphocytic leukemia) ([address] COPD (chronic obstructive pulmonary disease) ([address] Diabetes mellitus type II, uncontrolled ([address] Dyspnea on exertion; Essential tremor; GERD (gastroesophageal reflux disease); History of blood transfusion; Hyperkalemia; Hyperlipidemia; Hypertension; Idiopathic gout; Incontinence; Kidney stone; Lung cancer ([address] Malignant neoplasm of upper lobe, left bronchus or lung ([address] Muscle weakness; Nephrolithiasis; Nonspecific abnormal finding of lung field; Obesity; Paroxysmal a-fib ([address] Pneumonia; Presence of aortocoronary bypass graft; Presence of cardiac and vascular implant and graft; Prostate disease; Shingles; Skin cancer; Squamous cell carcinoma lung, left ([address] and Tobacco abuse.   Medications:  Scheduled Meds:   rifaximin  550 mg Oral BID    pantoprazole  40 mg Oral QAM AC    fluconazole  200 mg Oral Daily    beclomethasone  4 puff Inhalation Daily    albumin human  12.5 g Intravenous Q6H    sodium chloride flush  10 mL Intravenous 2 times per day    amiodarone  200 mg Oral Daily    insulin glargine  12 Units Subcutaneous Nightly    metoprolol  100 mg Oral BID    sulfacetaminde-prednisoLONE   Both Eyes 4x Daily    tamsulosin  0.8 mg Oral Daily    insulin lispro  0-12 Units Subcutaneous TID WC    insulin lispro  0-6 Units Subcutaneous Nightly    heparin (porcine)  5,000 Units Subcutaneous BID     Continuous Infusions:   sodium chloride 25 mL/hr at 07/30/18 1129    dextrose        Pertinent Info/Orders/Treatment Plan: Pt admitted with swelling. Creatinine was 3.5 and now is 4.9, [email or web address], NS @ 100/hr. S/P paracentesis and 2.7L off. GI, nephrology, and oncology following. Diet: DIET GENERAL; No Added Salt (3-4 GM)  Dietary Nutrition Supplements: Clear Liquid Oral Supplement   Vital Signs: BP (!) 109/53   Pulse 60   Temp 96.1 °F (35.6 °C) (Oral)   Resp 20   Ht 5' 11\" (1.803 m)   Wt 256 lb 8 oz (116.3 kg)   SpO2 98%   BMI 35.77 kg/m²   DVT Prophylaxis: heparin  Influenza Vaccination Screening Completed: yes  Pneumonia Vaccination Screening Completed: yes  PCP: Yun Lewis MD  Readmission: no  Readmission Risk Score: 48%    Discharge Planning  Current Residence:  Private Residence  Living Arrangements:  Alone   Support Systems:  Children, Family Members, Home Care Staff  Current Services PTA:     Potential Assistance Needed:  Home Care  Potential Assistance Purchasing Medications:  No  Does patient want to participate in local refill/ meds to beds program?  Yes  Type of Home Care Services:  Aide Services, Nursing Services, [place], PT, [facility]  Patient expects to be discharged to:  private residence  Expected Discharge date: Follow Up Appointment: Best Day/ Time: Monday AM    Discharge Plan: Pt from home alone. Spoke with patient and son. Pt has [facility] [place] on Aging services, home O2 and a walker at home. Son is requesting a hospice consult; will call attending and get order. Spoke with palliative care; they will wait to see since hospice will be consulted. Son is thinking his dad will need to go to [facility] [address] [place] [address] or [place]) at discharge to get stronger.    Evaluation: yes

## 2018-07-30 NOTE — PROGRESS NOTES
Nutrition Assessment    Type and Reason for Visit: Initial (nurse referral to see pt. re: nutrition)    Nutrition Recommendations: Initiate ensure clear twice/day. Continue current diet. Malnutrition Assessment:  · Malnutrition Status: Meets the criteria for moderate malnutrition  · Context: Acute illness or injury  · Findings of the 6 clinical characteristics of malnutrition (Minimum of 2 out of 6 clinical characteristics is required to make the diagnosis of moderate or severe Protein Calorie Malnutrition based on AND/ASPEN Guidelines):  1. Energy Intake-Greater than 75%,      2. Weight Loss-No significant weight loss,    3. Fat Loss-Mild subcutaneous fat loss, Orbital  4. Muscle Loss-Mild muscle mass loss, Temples (temporalis muscle)  5. Fluid Accumulation-Severe fluid accumulation, Extremities  6.  Strength-Not measured    Nutrition Diagnosis:   · Problem: Moderate malnutrition, in context of acute illness or injury  · Etiology: related to  (Liver Dysfunction/Cancer)     Signs and symptoms:  as evidenced by  (mild fat & muscle loss)    Nutrition Assessment:  · Subjective Assessment: Pt. 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 Labs include: (7/30) Alk Phos 589, , Ammonia 64, Bilirubin 1.6, Hemoglobin 10.1, BUN 70, creatinine 4.5 Poor prognosis noted. Pt. mentions poor appetite. He is unable to give me diet hx & reports h ate 1/2 slice Icelandic toast & coffee for breakfast. He denies chewing /swallowing difficulty. 4 BM noted (7/29-7/30). Pt. mentions he & son do the cooking at home. Son doesn't live with pt.    ·    · Wound Type: None  · Current Nutrition Therapies:  · Oral Diet Orders: No Added Salt (3-4gm)   · Oral Diet intake: 51-75%, %  · Oral Nutrition Supplement (ONS) Orders: Clear Liquid Oral Supplement  · ONS intake: Unable to assess  · Anthropometric Measures:  · Ht: 5' 11\" (180.3 cm)   · Current Body Wt: 256 lb 6.3 oz (116.3

## 2018-07-30 NOTE — PROGRESS NOTES
Component Value Date    PLT 77 07/29/2018     Hemoglobin/Hematocrit:    Lab Results   Component Value Date    HGB 11.3 07/29/2018    HCT 36.6 07/29/2018     BMP:    Lab Results   Component Value Date     07/29/2018    K 4.7 07/29/2018     07/29/2018    CO2 23 07/29/2018    BUN 69 07/29/2018    LABALBU 2.1 07/29/2018    LABALBU 2.1 07/29/2018    CREATININE 3.9 07/29/2018    CALCIUM 8.4 07/29/2018    LABGLOM 15 07/29/2018    GLUCOSE 88 07/29/2018    GLUCOSE 170 01/04/2018     Hepatic Function Panel:    Lab Results   Component Value Date    ALKPHOS 684 07/29/2018    ALKPHOS 691 07/29/2018     07/29/2018     07/29/2018     07/29/2018     07/29/2018    PROT 4.8 07/29/2018    PROT 4.8 07/29/2018    BILITOT 1.4 07/29/2018    BILITOT 1.5 07/29/2018    BILIDIR 1.2 07/29/2018    LABALBU 2.1 07/29/2018    LABALBU 2.1 07/29/2018     Calcium:    Lab Results   Component Value Date    CALCIUM 8.4 07/29/2018     PT/INR:    Lab Results   Component Value Date    PROTIME 11.4 01/04/2018    INR 1.17 07/29/2018     PTT:    Lab Results   Component Value Date    APTT 30.4 01/04/2018   [APTT     Significant Diagnostic Studies:     Current Meds:  Scheduled Meds:   albumin human  12.5 g Intravenous Q6H    sodium chloride flush  10 mL Intravenous 2 times per day    amiodarone  200 mg Oral Daily    aspirin  81 mg Oral Daily    atorvastatin  80 mg Oral Nightly    clopidogrel  75 mg Oral Daily    insulin glargine  12 Units Subcutaneous Nightly    metoprolol  100 mg Oral BID    sulfacetaminde-prednisoLONE   Both Eyes 4x Daily    tamsulosin  0.8 mg Oral Daily    insulin lispro  0-12 Units Subcutaneous TID WC    insulin lispro  0-6 Units Subcutaneous Nightly    heparin (porcine)  5,000 Units Subcutaneous BID    beclomethasone  2 puff Inhalation Daily     Continuous Infusions:   sodium chloride 100 mL/hr at 07/29/18 2336    dextrose       PRN Meds:.sodium chloride flush, magnesium hydroxide,

## 2018-07-30 NOTE — PROGRESS NOTES
SURGERY  1992    CABG    CATARACT REMOVAL Bilateral     COLONOSCOPY  11/2005    HERNIA REPAIR      bilateral inguinal    INNER EAR SURGERY      x2 , Dr Ashely Garrison LIVER BIOPSY      OTHER SURGICAL HISTORY      excision of basal cell carcinoma    OTHER SURGICAL HISTORY  2000    right lower eyelid    PACEMAKER PLACEMENT          Family History:  Family History   Problem Relation Age of Onset    Heart Disease Mother     Diabetes Mother     Heart Disease Father         Social History:  Social History     Social History    Marital status:      Spouse name: N/A    Number of children: N/A    Years of education: N/A     Occupational History    Not on file. Social History Main Topics    Smoking status: Former Smoker     Years: 45.00     Quit date: 7/29/1992    Smokeless tobacco: Never Used    Alcohol use No    Drug use: No    Sexual activity: Not on file     Other Topics Concern    Not on file     Social History Narrative    No narrative on file        Home Medications:  Prior to Admission medications    Medication Sig Start Date End Date Taking? Authorizing Provider   clopidogrel (PLAVIX) 75 MG tablet TAKE ONE TABLET BY MOUTH ONCE DAILY 7/17/18  Yes Jodie Townsend MD   sulfacetaminde-prednisoLONE (BLEPHAMIDE) 10-0.2 % ophthalmic ointment 4 times daily 4 times daily.    Yes Historical Provider, MD   acetaminophen (TYLENOL) 325 MG tablet Take 2 tablets by mouth every 6 hours as needed for Pain or Fever 5/25/18  Yes Zeeshan Rai MD   tamsulosin [facility]) 0.4 MG capsule Take 2 capsules by mouth daily 5/25/18  Yes Zeeshan Rai MD   insulin glargine (LANTUS SOLOSTAR) 100 UNIT/ML injection pen Inject 12 Units into the skin nightly Additional RF per Dr Brock Fore 5/25/18  Yes Zeeshan Rai MD   methenamine (HIPREX) 1 g tablet Take 1 tablet by mouth 2 times daily 4/23/18  Yes Jodie Townsend MD   amiodarone (CORDARONE) 200 MG tablet Take 1 tablet by mouth daily 4/23/18  Yes Jodie Townsend MD HGB  11.6*  11.3*  10.1*   PLT  96*  77*  71*     CMP:    Recent Labs      07/28/18   0150  07/29/18   0540  07/30/18   0349   NA  140  138  141   K  4.7  4.7  4.5   CL  99  100  102   CO2  28  23  22*   BUN  65*  69*  70*   CREATININE  3.5*  3.9*  4.5*   GLUCOSE  144*  88  89   CALCIUM  8.7  8.4*  8.5     Urine:  Recent Labs      07/30/18   0655   COLORU  YELLOW   PHUR  5.0   LABCAST  0-4 FINE GRAN  0-4 C. GRAN   WBCUA  > 200   RBCUA  15-20   YEAST  FEW BUDDING   BACTERIA  NONE   SPECGRAV  >=1.030   LEUKOCYTESUR  MODERATE*   UROBILINOGEN  1.0   BILIRUBINUR  SMALL*   BLOODU  LARGE*      Sed Rate:   Recent Labs      07/30/18   0349   SEDRATE  6            Radiology       Review of Systems:  Source of data: patient    CONSTITUTIONAL  Fever: none, Chills: none, Weight loss: yes, Malaise: yes, Fatigue: none, Diaphoresis: none,   Weakness: none    SKIN  Rash: none, Itch: none, Open sores: none    HENT:  Headache: none, Hearing loss: none, Tinnitus: none, Ear pain: none, Ear discharge: none,   Nasal bleeding: none, Congestion: none, Stridor: none, Sore throat: none. EYES  Blurred vision: none, Double vision: none, Photophobia: none, Eye pain: none, Eye discharge: none,  Eye redness: none. CARDIOVASCULAR  Chest pain: none, Arm pain: none, Palpitations: none, Orthopnea: none, Claudication: none,   Leg swelling: none, PND: none. RESPIRATORY  Cough: none, Hemoptysis: none, Sputum production: none, Shortness of breath: none, Wheezing: none    GASTROINTESTINAL  Heartburn: none, Nausea: none, Vomiting: none, Abdominal pain: none, Diarrhea: none,   Constipation: none, Blood in the stool: none, Melena: none, Rebound: none, Rovsing's: none. GENITOURINARY  Dysuria: none, Pyuria: none, Gross hematuria: none, Urgency: none, Flank pain: none, STD: none.     MUSCULOSKELETAL  Myalgia: none, Neck pain: none, Thoracic pain: none, Lumbar pain: none, Joint pain: none, Falls: none    ENDOCRINE/HEMATOLOGY/ALLERGIC  Easy bruising: none, Easy bleeding: none, Environmental allergies: none, Polydipsia: none. NEUROLOGICAL  Dizziness: none, Tingling: none, Numbness: none, Tremor: none, Sensory change: none,   Speech change: none, Focal weakness: none, Seizures: none, Loss of consciousness: none,    PSYCHIATRIC  Depression: none, Suicidal ideation: none, Heroin abuse: none, Cocaine abuse: none,   Marijuana abuse: none, Hallucinations: none, Anxiety: none, Memory loss: none       Physical Examination:  BP (!) 109/53   Pulse 60   Temp 96.1 °F (35.6 °C) (Oral)   Resp 20   Ht 5' 11\" (1.803 m)   Wt 256 lb 8 oz (116.3 kg)   SpO2 98%   BMI 35.77 kg/m²       General appearance: alert and appropriate  HEENT: Head: Normocephalic, no lesions, without obvious abnormality. Neck: no adenopathy, no carotid bruit and no JVD  Lungs: decreased breath sounds. Scattered rales. Dull bases  Heart: regular rate and rhythm, S1, S2 normal, no murmur, click, rub or gallop  Abdomen: distended with fluid wave. Extremities: 2-3 + pitting edema. Neurologic: Mental status: Alert, oriented, thought content appropriate  PSY: No evidence of depression. Mood is normal for the patient. Skin: Warm and dry. No unusual lesions or rashes noted. Muscles: Hand  is equal and strong bilaterally. Leg strength is equal and strong. Skeletal: No bony or skeletal abnormalities noted. Admission weight: 259 lb 12.8 oz (117.8 kg)  Wt Readings from Last 3 Encounters:   07/30/18 256 lb 8 oz (116.3 kg)   06/14/18 238 lb (108 kg)   05/29/18 235 lb 3.2 oz (106.7 kg)     Body mass index is 35.77 kg/m². Clinical Impressions:    1. Acute kidney injury from decreased vascular volume. He also has fungiuria and bacteremia. His kidneys are failing rapidly. 2. Abdominal ascites   3. New metastatic lesions in the liver. 4. CKD  5. DM  6. HTN  7. Kidney stone. 8. malnourished     Plan of Care    1. Avoid all: NSAIDS, ACEI, ARB  2.  Normally I would be suggesting a kidney biopsy and temporary hemodialysis. The patient at this time is not interested in either recommendation. I agree with hospice. Som Yeung, DO  [facility].

## 2018-07-30 NOTE — CONSULTS
[address] [place] [address]                                   CONSULTATION    PATIENT NAME: Efrem Pfefifer                  :        1928  MED REC NO:   [id number]                           ROOM:       0022  ACCOUNT NO:   [de-identified]                           ADMIT DATE: 2018  PROVIDER:     SARKIS Dimas DATE:  2018    ONCOLOGY CONSULTATION    HISTORY OF PRESENT ILLNESS:  The patient is an 54-year-old gentleman. I  have been following him for chronic lymphocytic leukemia and non-small cell  lung cancer. The patient was diagnosed with non-small cell lung cancer in  . The patient at the time of diagnosis, did have several comorbidities  and I felt that the patient is not a candidate for surgery. The patient  was treated with radiation therapy. The patient's chronic lymphocytic  leukemia has been stable and he did not require any treatment for that. The patient was admitted to [address] [place] [address] on 2018. He was  admitted because of leg swelling and exacerbation of COPD. The patient was  found to have ascites. CAT scan done to the abdomen showed liver cirrhosis  with multiple spots in his liver, suggesting metastatic cancer. The  patient is on 4 L oxygen at home. The patient has no complaint at the time  being. PAST MEDICAL HISTORY:  Positive for:  1. Respiratory failure. 2.  Adrenal disease. 3.  Coronary artery disease. 4.  Atrial fibrillation. 5.  Cardiac pacemaker insertion. 6.  Cavernous hemangioma of the liver. 7.  Chronic lymphocytic leukemia. 8.  COPD. 9.  Diabetes. 10.  GERD. 11.  Hypertension. 12.  Gout. 13.  Kidney stone. 14.  Obesity. 15.  Pneumonia. 16.  Skin cancer. 17.  Tobacco abuse. PAST SURGICAL HISTORY:  1. Appendectomy. 2.  Back surgery. 3.  CABG. 4.  Cataract surgery. 5.  Hernia repair. 6.  Liver biopsy.     ALLERGIES: bernardino Tipton M.D.    D: 07/30/2018 13:22:09       T: 07/30/2018 14:35:18     [email or web address]  Job#: [id number]     Doc#: [id number]    CC:

## 2018-07-31 NOTE — PLAN OF CARE
Problem: DISCHARGE BARRIERS  Goal: Patient's continuum of care needs are met  Outcome: Ongoing  Home with hospice vs inpatient hospice.

## 2018-07-31 NOTE — PROGRESS NOTES
41 05/08/2018    LABGLOM 38 05/07/2018          Compliance with treatment plan: 100%     Allergies and Intolerances:   ALLERGIES: Quinidine  MEDICATION INTOLERANCES: none  FOOD ALLERGIES: none  INSECT ALLERGIES: none  PLANT ALLERGIES: none     Past Medical History:  Past Medical History:   Diagnosis Date    Acute and chronic respiratory failure with hypoxia (HCC)     Acute respiratory distress syndrome (ARDS) (HCC)     Adrenal disease (HCC)     Dr Moody Bashir Adrenal tumor 9/24/2012    Atherosclerotic heart disease of native coronary artery with angina pectoris ([address]     Atrial fibrillation ([address]     Blood circulation, collateral     BPH (benign prostatic hyperplasia) 7/30/2012    CAD (coronary artery disease)     Dr Meet Roberts Cardiac pacemaker     Cavernous hemangioma     of liver, history of    Cerebrovascular accident ([address]     CHF (congestive heart failure) ([address] 7/30/2012    Dr Higgins Husbands    Chronic lymphocytic leukemia of B-cell type ([address]     Chronic sinusitis     CLL (chronic lymphocytic leukemia) ([address] 12/2012    Dr Bent[facility] COPD (chronic obstructive pulmonary disease) ([address]     Diabetes mellitus type II, uncontrolled ([address] 7/30/2012    Dyspnea on exertion     Essential tremor 7/30/2012    [place], Dr Carissa Archer GERD (gastroesophageal reflux disease)     History of blood transfusion     Hyperkalemia     Hyperlipidemia     Hypertension     Idiopathic gout     Incontinence     Kidney stone     Lung cancer ([address]     Malignant neoplasm of upper lobe, left bronchus or lung (HCC)     Muscle weakness     Nephrolithiasis     Nonspecific abnormal finding of lung field     Obesity     Paroxysmal a-fib (HCC)     Pneumonia     Presence of aortocoronary bypass graft     Presence of cardiac and vascular implant and graft     Prostate disease     Shingles     history of    Skin cancer     Dr Edgar Lyn    Squamous cell carcinoma lung, left ([address] 12/11/2017    Tobacco abuse     history of

## 2018-07-31 NOTE — FLOWSHEET NOTE
Pt was with family at the time of the visit. He was hoping to be well and wanted prayer and I prayed asking God to heal him.      07/31/18 1601   Encounter Summary   Services provided to: Patient and family together   Referral/Consult From: [address] Visiting Yes  (7/31 )   Complexity of Encounter Low   Length of Encounter 15 minutes   Spiritual/Hindu   Type Spiritual support   Assessment Approachable;Calm; Hopeful   Intervention Prayer;Nurtured hope; Active listening   Outcome Connection/belonging;Expressed gratitude;Engaged in conversation;Expressed feelings/needs/concerns

## 2018-07-31 NOTE — PROGRESS NOTES
1230:Chart reviewed this am and noted documented confusion. Up to visit pt at this time and spoke with son in lobby. He states that they are requesting hospice care for their father, and Cyndi tolbert is going to see if he can stay here. \" Family awaiting hospice eval, as [facility] on unit now. Will follow supportively at this time.

## 2018-07-31 NOTE — PROGRESS NOTES
Hospitalist Progress Note    Patient:  Kelton Dunn      Unit/Bed:3B-22/022-A    YOB: 1928    MRN: [id number]       Acct: [de-identified]     PCP: Jeremiah Marsh MD    Date of Admission: 7/28/2018    Chief Complaint: Abd pain    Hospital Course: Pt admitted today with abd pain and liver lesions likely metastatic dx. GI and oncology following. Resume home meds  See H&P    7/29/18: Pt doing ok. Cultures 1/2. Hold antibiotics. Planned paracentesis today. Hold antiplatelets. Palliative care following. Poor prognosis    7/30/18: Pt doing ok. D/w oncology, will need biopsy for consideration of treatment. Pt wants to talk to hospice. Kidney function worsening. Cultures likely contamination. No antibiotics for now. Appreciate Nephrology input. 7/31/18: Pt not doing ok. Likely hospice. Follow recommendations. Met with pt and family. Plan is home with hospice tomorrow.  Transition to comfort care    Subjective: Abd pain      Medications:  Reviewed    Infusion Medications    dextrose       Scheduled Medications    rifaximin  550 mg Oral BID    pantoprazole  40 mg Oral QAM AC    fluconazole  200 mg Oral Daily    beclomethasone  4 puff Inhalation Daily    sodium chloride flush  10 mL Intravenous 2 times per day    amiodarone  200 mg Oral Daily    insulin glargine  12 Units Subcutaneous Nightly    metoprolol  100 mg Oral BID    sulfacetaminde-prednisoLONE   Both Eyes 4x Daily    tamsulosin  0.8 mg Oral Daily    insulin lispro  0-12 Units Subcutaneous TID WC    insulin lispro  0-6 Units Subcutaneous Nightly    heparin (porcine)  5,000 Units Subcutaneous BID     PRN Meds: sodium chloride flush, magnesium hydroxide, potassium chloride **OR** potassium chloride **OR** potassium chloride, magnesium sulfate, ondansetron, acetaminophen, albuterol, nitroGLYCERIN, glucose, dextrose, glucagon (rDNA), dextrose, tetrahydrozoline      Intake/Output Summary (Last 24 hours) at 07/31/18 1131  Last data filed at 07/31/18 0351   Gross per 24 hour   Intake          1410.67 ml   Output              100 ml   Net          1310.67 ml       Diet:  DIET GENERAL; No Added Salt (3-4 GM)  Dietary Nutrition Supplements: Clear Liquid Oral Supplement    Exam:  BP (!) 87/51   Pulse 61   Temp 96.7 °F (35.9 °C) (Oral)   Resp 22   Ht 5' 11\" (1.803 m)   Wt 262 lb 4.8 oz (119 kg)   SpO2 97%   BMI 36.58 kg/m²     General appearance: No apparent distress, appears stated age and cooperative. HEENT: Pupils equal, round, and reactive to light. Conjunctivae/corneas clear. Neck: Supple, with full range of motion. Respiratory:  Diminished air entry globally   Cardiovascular: Regular rate and rhythm with normal S1/S2   Abdomen: Obese, firm,tender,  with normal bowel sounds. Musculoskeletal: passive and active ROM x 4 extremities. Skin: Skin color, texture, turgor normal.  No rashes or lesions. Neurologic:  Grossly non-focal.  Psychiatric: Alert and oriented, thought content appropriate, normal insight  Capillary Refill: Brisk,< 3 seconds   Peripheral Pulses: +2 palpable, equal bilaterally       Labs:   Recent Labs      07/29/18   0540  07/30/18   0349   WBC  11.2*  10.4   HGB  11.3*  10.1*   HCT  36.6*  33.6*   PLT  77*  71*     Recent Labs      07/29/18   0540  07/30/18   0349  07/31/18   0612   NA  138  141  141   K  4.7  4.5  4.9   CL  100  102  103   CO2  23  22*  23   BUN  69*  70*  77*   CREATININE  3.9*  4.5*  5.0*   CALCIUM  8.4*  8.5  8.2*     Recent Labs      07/29/18   0540  07/30/18   0834  07/31/18   0612   AST  120*  120*  117*  150*   ALT  112*  113*  104*  119*   BILIDIR  1.2*  1.1*  1.1*   BILITOT  1.5*  1.4*  1.6*  1.5*   ALKPHOS  691*  684*  589*  545*     Recent Labs      07/29/18   0132  07/30/18   0834   INR  1.17*  1.15*     No results for input(s): CKTOTAL, TROPONINI in the last 72 hours.     Urinalysis:      Lab Results   Component Value Date    NITRU NEGATIVE 07/30/2018    WBCUA > 200 07/30/2018 WBCUA 0-5 01/02/2015    BACTERIA NONE 07/30/2018    RBCUA 15-20 07/30/2018    BLOODU LARGE 07/30/2018    SPECGRAV >=1.030 07/30/2018    GLUCOSEU NEGATIVE 07/28/2018       Radiology:  XR CHEST PORTABLE   Final Result   1. Interval development of mild pulmonary vascular congestion. 2. Persistent left lower lobe pneumonia. 3. Blunting of bilateral costophrenic angles which may represent small pleural effusions. 4. Stable appearance of left upper lobe mass/scar. **This report has been created using voice recognition software. It may contain minor errors which are inherent in voice recognition technology. **      Final report electronically signed by Dr Lurdes Soliman on 7/30/2018 1:36 PM      US GUIDED PARACENTESIS   Final Result   1. Uncomplicated diagnostic and therapeutic paracentesis. **This report has been created using voice recognition software. It may contain minor errors which are inherent in voice recognition technology. **      Final report electronically signed by Dr. Eileen Ceballos on 7/29/2018 2:33 PM      CT ABDOMEN PELVIS WO CONTRAST Additional Contrast? None   Final Result   Interval development of multiple liver masses consistent with metastatic disease. Cirrhotic liver. Moderate ascites. Small to moderate left pleural effusion with left lower lobe atelectasis. Gallstones without evidence of biliary dilatation. No acute inflammatory or infectious process in the abdomen or pelvis. No evidence of bowel obstruction. Multiple renal calculi. No hydroureteronephrosis. Colonic diverticulosis without diverticulitis. **This report has been created using voice recognition software. It may contain minor errors which are inherent in voice recognition technology. **      Final report electronically signed by Dr. Mago Blas on 7/28/2018 3:04 AM      XR CHEST PORTABLE   Final Result      New left lower lobe pneumonia. Mild right basilar atelectasis. Accentuation of the previously noted left upper lobe scar or mass probably due to more lordotic technique. If further evaluation is warranted recommend PA and lateral views of the chest.            **This report has been created using voice recognition software. It may contain minor errors which are inherent in voice recognition technology. **      Final report electronically signed by Dr. Rosina Suarez on 7/28/2018 1:57 AM          Diet: DIET GENERAL; No Added Salt (3-4 GM)  Dietary Nutrition Supplements: Clear Liquid Oral Supplement    DVT prophylaxis: [x] Lovenox                                 [] SCDs                                 [x] SQ Heparin                                 [] Encourage ambulation           [] Already on Anticoagulation     Disposition:    [] Home       [] TCU       [] Rehab       [] Psych       [] SNF       [] [place]       [] Other-    Code Status: DNR-CCA    PT/OT Eval Status: y  Assessment/Plan:    Anticipated Discharge in : 2-4 days    Active Hospital Problems    Diagnosis Date Noted    Coronary artery disease involving native coronary artery of native heart without angina pectoris [I25.10]      Priority: High    Moderate malnutrition ([facility] [E44.0] 07/30/2018    Bilateral lower extremity edema [R60.0]     Liver metastases (HCC) [C78.7]     Malignant ascites [R18.0]     Pneumonia due to organism [J18.9]     Liver mass [R16.0] 07/28/2018    DARIAN (acute kidney injury) ([facility] [N17.9]     CKD stage 3 due to type 2 diabetes mellitus ([facility] [B79.41, N18.3] 05/06/2018    Chronic respiratory failure with hypoxia ([facility] [J96.11] 03/01/2018    GERD (gastroesophageal reflux disease) [K21.9] 10/07/2016    Hyperlipidemia [E78.5]     BPH (benign prostatic hyperplasia) [N40.0] 07/30/2012    Essential tremor [G25.0] 07/30/2012    Type 2 diabetes mellitus with stage 3 chronic kidney disease, with long-term current use of insulin ([facility] [E11.22, N18.3, Z79.4] 07/30/2012

## 2018-07-31 NOTE — PLAN OF CARE
Problem: Falls - Risk of:  Goal: Will remain free from falls  Will remain free from falls   Outcome: Ongoing  No falls noted this shift. Continue falling star program. Bed alarm on, bed in low position. Call light and personal belongings in reach. Patient uses call light appropriately. Problem: Risk for Impaired Skin Integrity  Goal: Tissue integrity - skin and mucous membranes  Structural intactness and normal physiological function of skin and  mucous membranes. Outcome: Ongoing  No new signs or symptoms of skin breakdown noted this shift, encouraging patient to turn and reposition self in bed q2h. Redness to buttocks, turning and repositioning q2h and prn    Problem: Nutrition  Goal: Optimal nutrition therapy  Outcome: Ongoing  Appetite decreased, offering foods patient likes     Problem: Discharge Planning:  Goal: Participates in care planning  Participates in care planning   Outcome: Ongoing  Home alone, hospice to talk with family today    Problem: Cardiac Output - Decreased:  Intervention: Monitor hemodynamic parameters  VSS, remains on 3L per NC      Problem: Serum Glucose Level - Abnormal:  Goal: Ability to maintain appropriate glucose levels will improve to within specified parameters  Ability to maintain appropriate glucose levels will improve to within specified parameters   Outcome: Ongoing  chems achs with SS Insulin    Comments: Care plan reviewed with patient. Patient  verbalize understanding of the plan of care and contribute to goal setting.

## 2018-08-01 PROBLEM — C34.92 SQUAMOUS CELL LUNG CANCER, LEFT (HCC): Status: ACTIVE | Noted: 2018-01-01

## 2018-08-01 NOTE — PROGRESS NOTES
Hospitalist Progress Note    Patient:  Octavio Cain      Unit/Bed:3B-22/022-A    YOB: 1928    MRN: [id number]       Acct: [de-identified]     PCP: Victorina Liu MD    Date of Admission: 7/28/2018    Chief Complaint: Abd pain    Hospital Course: Pt admitted today with abd pain and liver lesions likely metastatic dx. GI and oncology following. Resume home meds  See H&P    7/29/18: Pt doing ok. Cultures 1/2. Hold antibiotics. Planned paracentesis today. Hold antiplatelets. Palliative care following. Poor prognosis    7/30/18: Pt doing ok. D/w oncology, will need biopsy for consideration of treatment. Pt wants to talk to hospice. Kidney function worsening. Cultures likely contamination. No antibiotics for now. Appreciate Nephrology input. 7/31/18: Pt not doing ok. Likely hospice. Follow recommendations. Met with pt and family. Plan is home with hospice tomorrow. Transition to comfort care    8/1/18: Pt comfortable. Hospice following. May qualify for inpt treatment. May need ain control. Met with pt and family at bedside.  Comfort care    Subjective: Abd pain      Medications:  Reviewed    Infusion Medications     Scheduled Medications    pantoprazole  40 mg Oral QAM AC    beclomethasone  4 puff Inhalation Daily    sodium chloride flush  10 mL Intravenous 2 times per day    sulfacetaminde-prednisoLONE   Both Eyes 4x Daily    heparin (porcine)  5,000 Units Subcutaneous BID     PRN Meds: morphine **OR** morphine, morphine 20MG/ML, haloperidol lactate, sodium chloride flush, magnesium hydroxide, potassium chloride **OR** potassium chloride **OR** potassium chloride, magnesium sulfate, ondansetron, acetaminophen, albuterol, nitroGLYCERIN, tetrahydrozoline      Intake/Output Summary (Last 24 hours) at 08/01/18 1030  Last data filed at 08/01/18 0020   Gross per 24 hour   Intake              320 ml   Output               25 ml   Net              295 ml       Diet:  DIET GENERAL; No Added Salt (3-4 which may represent small pleural effusions. 4. Stable appearance of left upper lobe mass/scar. **This report has been created using voice recognition software. It may contain minor errors which are inherent in voice recognition technology. **      Final report electronically signed by Dr Pa Bone on 7/30/2018 1:36 PM      US GUIDED PARACENTESIS   Final Result   1. Uncomplicated diagnostic and therapeutic paracentesis. **This report has been created using voice recognition software. It may contain minor errors which are inherent in voice recognition technology. **      Final report electronically signed by Dr. Blanca Higginbotham on 7/29/2018 2:33 PM      CT ABDOMEN PELVIS WO CONTRAST Additional Contrast? None   Final Result   Interval development of multiple liver masses consistent with metastatic disease. Cirrhotic liver. Moderate ascites. Small to moderate left pleural effusion with left lower lobe atelectasis. Gallstones without evidence of biliary dilatation. No acute inflammatory or infectious process in the abdomen or pelvis. No evidence of bowel obstruction. Multiple renal calculi. No hydroureteronephrosis. Colonic diverticulosis without diverticulitis. **This report has been created using voice recognition software. It may contain minor errors which are inherent in voice recognition technology. **      Final report electronically signed by Dr. Anyi Jin on 7/28/2018 3:04 AM      XR CHEST PORTABLE   Final Result      New left lower lobe pneumonia. Mild right basilar atelectasis. Accentuation of the previously noted left upper lobe scar or mass probably due to more lordotic technique. If further evaluation is warranted recommend PA and lateral views of the chest.            **This report has been created using voice recognition software. It may contain minor errors which are inherent in voice recognition technology. **      Final report electronically 8/1/2018 at 10:30 AM

## 2018-08-01 NOTE — CARE COORDINATION
8/1/18, 11:56 AM    Discharge plan discussed by  and . Discharge plan reviewed with patient/ family. Patient/ family verbalize understanding of discharge plan and are in agreement with plan. Understanding was demonstrated using the teach back method. Patient will be transferred to Inpatient Hospice. BARBARA De La Cruz at [facility] aware.

## 2018-08-01 NOTE — PROGRESS NOTES
[facility]    [facility]  [address]  [place], [facility]  [address] Nephrology Progress Note      8/1/2018 11:18 AM         Pt Name:    Annabelle Shirley  MRN:     [id number]   [id number]  YOB: 1928  Admit Date:    7/28/2018 12:30 AM  Primary Care Physician:  Tiffanie Gleason MD   Room Number:  3B-22/022-A   Reason for Consult:  Decreased eGFR  Requesting Providor: Dr. Arden Renee  Primary Nephrologist: Dr. Velasquez Parra    ### ISOLATION ###:   none     Admit Chief Complaint: weakness and abnormal labs and leg swelling. History of Chief Complaint:   The patient is a 80y.o. year old white male who was seen by Dr. Velasquez Parra in [facility] for DARIAN. He has a history of DM, HTN, Hematuria and kidney stones. His kidney function improved and he was discharged. He was found to have leg swelling. Labs were drawn and he was found to have acute kidney injury again. His appetite has been poor. He has a chronic indwelling rhodes catheter. He has not been taking NSAIDS, ACEI, ARB. He has known metastatic disease and has declined treatment. He has  New lesions in the liver. Nephrology is following the patient for: acute kidney injury     24 hour summary:   The patient was relaxing in bed. He is alert but appears somewhat confused. Family has decided to refuse dialysis and kidney biopsy ( I agree)     Baseline eGFR 55-65 ml/min   Admit eGFR 15 ml/min   Current eGFR 11 ml/min       eGFR trend    Lab Results   Component Value Date    LABGLOM 11 07/31/2018    LABGLOM 12 07/30/2018    LABGLOM 15 07/29/2018    LABGLOM 17 07/28/2018    LABGLOM 16 07/27/2018    LABGLOM 48 05/29/2018    LABGLOM 70 05/20/2018    LABGLOM 70 05/14/2018    LABGLOM 57 05/12/2018    LABGLOM 44 05/09/2018    LABGLOM 41 05/08/2018    LABGLOM 38 05/07/2018          Compliance with treatment plan: 100%     Allergies and Intolerances:   ALLERGIES: Quinidine  MEDICATION INTOLERANCES: none  FOOD ALLERGIES: none  INSECT  COLONOSCOPY  11/2005    HERNIA REPAIR      bilateral inguinal    INNER EAR SURGERY      x2 , Dr Cass Beauchamp      excision of basal cell carcinoma    OTHER SURGICAL HISTORY  2000    right lower eyelid    PACEMAKER PLACEMENT          Family History:  Family History   Problem Relation Age of Onset    Heart Disease Mother     Diabetes Mother     Heart Disease Father         Social History:  Social History     Social History    Marital status:      Spouse name: N/A    Number of children: N/A    Years of education: N/A     Occupational History    Not on file. Social History Main Topics    Smoking status: Former Smoker     Years: 45.00     Quit date: 7/29/1992    Smokeless tobacco: Never Used    Alcohol use No    Drug use: No    Sexual activity: Not on file     Other Topics Concern    Not on file     Social History Narrative    No narrative on file        Home Medications:  Prior to Admission medications    Medication Sig Start Date End Date Taking? Authorizing Provider   clopidogrel (PLAVIX) 75 MG tablet TAKE ONE TABLET BY MOUTH ONCE DAILY 7/17/18  Yes Avelino Zhao MD   sulfacetaminde-prednisoLONE (BLEPHAMIDE) 10-0.2 % ophthalmic ointment 4 times daily 4 times daily.    Yes Historical Provider, MD   acetaminophen (TYLENOL) 325 MG tablet Take 2 tablets by mouth every 6 hours as needed for Pain or Fever 5/25/18  Yes Cassy Bermudez MD   tamsulosin [facility]) 0.4 MG capsule Take 2 capsules by mouth daily 5/25/18  Yes Cassy Bermudez MD   insulin glargine (LANTUS SOLOSTAR) 100 UNIT/ML injection pen Inject 12 Units into the skin nightly Additional RF per Dr Celia Castaneda 5/25/18  Yes Cassy Bermudez MD   methenamine (HIPREX) 1 g tablet Take 1 tablet by mouth 2 times daily 4/23/18  Yes Avelino Zhao MD   amiodarone (CORDARONE) 200 MG tablet Take 1 tablet by mouth daily 4/23/18  Yes Avelino Zhao MD   guaiFENesin (MUCINEX) 600 MG extended release tablet Take 1 tablet by mouth 2 times daily 3/7/18  Yes Amado Escobar MD   budesonide (PULMICORT FLEXHALER) 180 MCG/ACT AEPB inhaler Inhale 2 puffs into the lungs daily   Yes Historical Provider, MD   atorvastatin (LIPITOR) 80 MG tablet TAKE ONE TABLET BY MOUTH ONCE DAILY 12/20/17  Yes Devora Wilson MD   Insulin Pen Needle (B-D ULTRAFINE III SHORT PEN) 31G X 8 MM MISC Inject insulin SQ 2 x daily (Dx: E11.65) 12/18/17  Yes SEBASTIAN Robbins CNP   albuterol (PROVENTIL) (2.5 MG/3ML) 0.083% nebulizer solution Take 3 mLs by nebulization every 4 hours as needed for Wheezing 4/18/16  Yes Devora Wilson MD   metoprolol (LOPRESSOR) 100 MG tablet Take 100 mg by mouth 2 times daily. Yes Historical Provider, MD   Coenzyme Q10 (COQ10 PO) Take 10 mg by mouth daily    Yes Historical Provider, MD   aspirin 81 MG tablet Take 81 mg by mouth daily. Yes Historical Provider, MD   Cranberry 500 MG TABS Take 1,000 mg by mouth daily    Yes Historical Provider, MD   fish oil-omega-3 fatty acids 1000 MG capsule Take 1 g by mouth daily    Yes Historical Provider, MD   therapeutic multivitamin-minerals (THERAGRAN-M) tablet Take 1 tablet by mouth daily.      Yes Historical Provider, MD   ONE TOUCH ULTRA TEST strip USE TO CHECK BLOOD SUGAR TWICE DAILY 6/28/18   SEBASTIAN Arora CNP   Incontinence Supplies (BEDSIDE DRAINAGE BAG) [address] [facility] Large 2000 cc bedside drainage bag 6/14/18   SEBASTIAN Dejesus CNP   Blood Glucose Monitoring Suppl (ONE TOUCH ULTRA MINI) w/Device KIT 1 kit by Does not apply route daily Dx E11.8 8/8/17   Dipak Ribeiro MD   nitroGLYCERIN (NITROSTAT) 0.4 MG SL tablet Place 1 tablet under the tongue every 5 minutes as needed for Chest pain 5/24/16   Devora Wilson MD   Lancets MISC Check 2x/day 7/10/13   Dipak Ribeiro MD        Lab data:  CBC:    Recent Labs      07/30/18   0349   WBC  10.4   HGB  10.1*   PLT  71*     CMP:    Recent Labs      07/30/18   0349  07/31/18   0612   NA  141  141   K Speech change: none, Focal weakness: none, Seizures: none, Loss of consciousness: none,    PSYCHIATRIC  Depression: none, Suicidal ideation: none, Heroin abuse: none, Cocaine abuse: none,   Marijuana abuse: none, Hallucinations: none, Anxiety: none, Memory loss: none       Physical Examination:  BP (!) 88/40   Pulse 58   Temp 97.4 °F (36.3 °C) (Oral)   Resp 24   Ht 5' 11\" (1.803 m)   Wt 262 lb 4.8 oz (119 kg)   SpO2 (!) 88%   BMI 36.58 kg/m²       General appearance: alert and appropriate. Mildly confused. HEENT: Head: Normocephalic, no lesions, without obvious abnormality. Neck: no adenopathy, no carotid bruit and no JVD  Lungs: decreased breath sounds. Scattered rales. Dull bases  Heart: regular rate and rhythm, S1, S2 normal, no murmur, click, rub or gallop  Abdomen: distended with fluid wave. Extremities: 2-3 + pitting edema. Neurologic: Mental status: Alert, oriented, thought content appropriate  PSY: No evidence of depression. Mood is normal for the patient. Skin: Warm and dry. No unusual lesions or rashes noted. Muscles: Hand  is equal and strong bilaterally. Leg strength is equal and strong. Skeletal: No bony or skeletal abnormalities noted. Admission weight: 259 lb 12.8 oz (117.8 kg)  Wt Readings from Last 3 Encounters:   07/31/18 262 lb 4.8 oz (119 kg)   06/14/18 238 lb (108 kg)   05/29/18 235 lb 3.2 oz (106.7 kg)     Body mass index is 36.58 kg/m². Clinical Impressions:    1. Acute kidney injury from decreased vascular volume. He also has fungiuria and bacteremia. His kidneys are failing rapidly. 2. Abdominal ascites   3. New metastatic lesions in the liver. 4. CKD  5. DM  6. HTN  7. Kidney stone. 8. malnourished     Plan of Care    1. Avoid all: NSAIDS, ACEI, ARB  2. Normally I would be suggesting a kidney biopsy and temporary hemodialysis. The patient at this time is not interested in either recommendation. I agree with hospice.  Need family input as the patient is

## 2018-08-01 NOTE — PROGRESS NOTES
This staff and Shalom Sherwood met with pt and his sons Martín Menjivar and Carlos Manuel Noble for further hospice referral, admission, and psychosocial assessment. Pt and his wife (who passed away 4 years ago) had 7 children, one of which  in a motor vehicle accident 15 years ago. Five of the six surviving children live in the area. Sons Edwardo and Martín Menjivar are his durable health care attorneys. Pt is not comfortable at this time, he is restless and has asked repeatedly during this last hour for help in moving to become more comfortable. This staff offered support and will remain available.

## 2018-08-01 NOTE — PROGRESS NOTES
Pt noted to be moaning, grimacing and asking for help. Respirations at 26 with use of accessory muscles. 02 at 4L NC. Assisted patient primary nurse to set up morphine infusion at 1mg/hr. Pt to transition to [facility] for hospice care.

## 2018-08-01 NOTE — PLAN OF CARE
Problem: Falls - Risk of:  Goal: Will remain free from falls  Will remain free from falls   Outcome: Ongoing  Assessment & interventions provided throughout shift. Bed locked & in low position, call light in reach, side-rails up x2,  reminded patient to use call light to call for assistance. Goal: Absence of physical injury  Absence of physical injury   Outcome: Ongoing  Pt. Was encouraged to use call light throughout shift for repositioning. Pt. Checked on throughout the shift and repositioned as needed. Problem: Risk for Impaired Skin Integrity  Goal: Tissue integrity - skin and mucous membranes  Structural intactness and normal physiological function of skin and  mucous membranes. Intervention: SKIN ASSESSMENT  Ongoing assessment & interventions provided throughout shift. Skin assessments provided. Encouraging/assisting patient to turn as needed. Comments: Care plan reviewed with patient. Patient verbalizes understanding of the care plan and contributed to goal setting.

## 2018-08-02 LAB — BLOOD CULTURE, ROUTINE: NORMAL

## 2018-08-02 PROCEDURE — 6370000000 HC RX 637 (ALT 250 FOR IP): Performed by: INTERNAL MEDICINE

## 2018-08-03 LAB
AEROBIC CULTURE: NORMAL
ANAEROBIC CULTURE: NORMAL
GRAM STAIN RESULT: NORMAL

## 2018-08-06 NOTE — DISCHARGE SUMMARY
Hospital Medicine Discharge Summary      Patient Identification:   Jj Melara   : 1928  MRN: [id number]   Account: [de-identified]      Patient's PCP: Peyton Mccollum MD    Admit Date: 2018     Discharge Date: 2018      Admitting Physician: Mimi Del Valle MD     Discharge Physician: Mimi Del Valle MD     Discharge Diagnoses: Active Hospital Problems    Diagnosis Date Noted    Squamous cell lung cancer, left [facility]) [C34.92] 2018       T    Hospital Course:   jJ Melara is a 80 y.o. male admitted to [address] [place] [address] on 2018 for multi organ failure for end of life mgt. Pt was placed on comfort care prior to transfer. Pt succumbed upon arrival to the floor at about 14:21. Exam:     Vitals: There were no vitals filed for this visit. Weight:       24 hour intake/output:No intake or output data in the 24 hours ending 18 [id number]        Labs: For convenience and continuity at follow-up the following most recent labs are provided:      CBC:    Lab Results   Component Value Date    WBC 10.4 2018    HGB 10.1 2018    HCT 33.6 2018    PLT 71 2018       Renal:    Lab Results   Component Value Date     2018    K 4.9 2018    K 4.7 2018     2018    CO2 23 2018    BUN 77 2018    CREATININE 5.0 2018    CALCIUM 8.2 2018    PHOS 3.7 2018         Significant Diagnostic Studies    Radiology:   No orders to display          Consults:     IP CONSULT TO HOSPICE    Disposition:    [] Home       [] TCU       [] Rehab       [] Psych       [] SNF       [] [place]       [] Other-    Condition at Discharge: pt     Code Status:  Prior     Patient Instructions:    Discharge lab work: Activity:   Diet:        Follow-up visits:   No follow-up provider specified. Discharge Medications: There are no discharge medications for this patient.        Time Spent on

## 2019-12-03 NOTE — PROGRESS NOTES
Pharmacy Note  [employer] Result    Kulwant Mock is a 80 y.o. male, with a positive blood culture result    [employer] message received from Fany Michelle , laboratory employee on 7/29/2018 at 8:58 AM    First Gram stain result: negative    BioFire BCID result: no organisms detected    [employer] BCID and gram stain congruent? Yes    Suspected source? none    Patient chart reviewed for signs/symptoms of infection: No  BP (!) 111/53   Pulse 60   Temp 97.5 °F (36.4 °C) (Oral)   Resp 20   Ht 5' 11\" (1.803 m)   Wt 259 lb 12.8 oz (117.8 kg)   SpO2 94%   BMI 36.23 kg/m²   Lab Results   Component Value Date    WBC 11.2 (H) 07/29/2018       Allergies reviewed  Quinidine    Renal function reviewed  Estimated Creatinine Clearance: 17 mL/min (A) (based on SCr of 3.9 mg/dL [facility])). Current antibiotic regimen: Zosyn    Intervention needed: Yes    Individual contacted: Provider Dr. Danny Holcomb     Recommendations: Stop Zosyn    Recommendations accepted?  Yes    Jayla Henriquez  7/29/2018 8:58 AM DISPLAY PLAN FREE TEXT

## 2020-02-25 NOTE — BRIEF OP NOTE
Post Procedure Progress Note    7/29/2018  Pre-Procedure Diagnosis: Ascites  Post-Procedure Diagnosis: Same  Physician: Tali Cummins MD  Anesthesia: 2% lidocaine local  Procedure Performed: Ultrasound guided paracentesis  Specimen Removed: 80 ml specimen sent to the lab  Disposition of Specimen: 2.7 liters clear yellow ascites  Estimated Blood Loss: None  Complications: None No overlying skin changes noted to back.

## 2024-04-01 NOTE — FLOWSHEET NOTE
08/01/18 0146   Provider Notification   Reason for Communication Patient request   Provider Name THE [facility]   Provider Notification Nurse Practitioner   Method of Communication Secure Message   Response See orders   Notification Time 0101     Pt. Stated morphine wasn't working an hour after it was given. He is here for hospice, comfort care. She ordered a one time dose of 2mg of morphine, and increased his dose. Will continue to monitor. Patient expressed no known problems or needs